# Patient Record
Sex: MALE | Employment: FULL TIME | URBAN - METROPOLITAN AREA
[De-identification: names, ages, dates, MRNs, and addresses within clinical notes are randomized per-mention and may not be internally consistent; named-entity substitution may affect disease eponyms.]

---

## 2017-01-11 ENCOUNTER — TRANSFERRED RECORDS (OUTPATIENT)
Dept: HEALTH INFORMATION MANAGEMENT | Facility: CLINIC | Age: 52
End: 2017-01-11

## 2017-01-25 ENCOUNTER — TRANSFERRED RECORDS (OUTPATIENT)
Dept: HEALTH INFORMATION MANAGEMENT | Facility: CLINIC | Age: 52
End: 2017-01-25

## 2017-01-27 ENCOUNTER — TRANSFERRED RECORDS (OUTPATIENT)
Dept: HEALTH INFORMATION MANAGEMENT | Facility: CLINIC | Age: 52
End: 2017-01-27

## 2017-01-31 ENCOUNTER — MYC MEDICAL ADVICE (OUTPATIENT)
Dept: FAMILY MEDICINE | Facility: CLINIC | Age: 52
End: 2017-01-31

## 2017-01-31 DIAGNOSIS — M47.816 OSTEOARTHRITIS OF LUMBAR SPINE, UNSPECIFIED SPINAL OSTEOARTHRITIS COMPLICATION STATUS: Primary | ICD-10-CM

## 2017-02-08 ENCOUNTER — TRANSFERRED RECORDS (OUTPATIENT)
Dept: HEALTH INFORMATION MANAGEMENT | Facility: CLINIC | Age: 52
End: 2017-02-08

## 2017-02-08 ENCOUNTER — OFFICE VISIT (OUTPATIENT)
Dept: FAMILY MEDICINE | Facility: CLINIC | Age: 52
End: 2017-02-08
Payer: COMMERCIAL

## 2017-02-08 VITALS
TEMPERATURE: 97.8 F | BODY MASS INDEX: 28.33 KG/M2 | SYSTOLIC BLOOD PRESSURE: 116 MMHG | HEIGHT: 66 IN | HEART RATE: 57 BPM | WEIGHT: 176.3 LBS | DIASTOLIC BLOOD PRESSURE: 79 MMHG | OXYGEN SATURATION: 99 %

## 2017-02-08 DIAGNOSIS — M47.816 OSTEOARTHRITIS OF LUMBAR SPINE, UNSPECIFIED SPINAL OSTEOARTHRITIS COMPLICATION STATUS: ICD-10-CM

## 2017-02-08 DIAGNOSIS — Z13.6 CARDIOVASCULAR SCREENING; LDL GOAL LESS THAN 130: ICD-10-CM

## 2017-02-08 DIAGNOSIS — Z11.59 NEED FOR HEPATITIS C SCREENING TEST: ICD-10-CM

## 2017-02-08 DIAGNOSIS — M47.26 OSTEOARTHRITIS OF SPINE WITH RADICULOPATHY, LUMBAR REGION: ICD-10-CM

## 2017-02-08 DIAGNOSIS — I10 ESSENTIAL HYPERTENSION WITH GOAL BLOOD PRESSURE LESS THAN 140/90: Primary | ICD-10-CM

## 2017-02-08 DIAGNOSIS — Z12.5 SCREENING FOR PROSTATE CANCER: ICD-10-CM

## 2017-02-08 DIAGNOSIS — E87.6 HYPOKALEMIA: ICD-10-CM

## 2017-02-08 LAB
ANION GAP SERPL CALCULATED.3IONS-SCNC: 9 MMOL/L (ref 3–14)
BUN SERPL-MCNC: 12 MG/DL (ref 7–30)
CALCIUM SERPL-MCNC: 9.2 MG/DL (ref 8.5–10.1)
CHLORIDE SERPL-SCNC: 103 MMOL/L (ref 94–109)
CHOLEST SERPL-MCNC: 195 MG/DL
CO2 SERPL-SCNC: 30 MMOL/L (ref 20–32)
CREAT SERPL-MCNC: 1.12 MG/DL (ref 0.66–1.25)
GFR SERPL CREATININE-BSD FRML MDRD: 68 ML/MIN/1.7M2
GLUCOSE SERPL-MCNC: 106 MG/DL (ref 70–99)
HCV AB SERPL QL IA: NORMAL
HDLC SERPL-MCNC: 56 MG/DL
LDLC SERPL CALC-MCNC: 116 MG/DL
NONHDLC SERPL-MCNC: 139 MG/DL
POTASSIUM SERPL-SCNC: 3.2 MMOL/L (ref 3.4–5.3)
PSA SERPL-ACNC: 0.93 UG/L (ref 0–4)
SODIUM SERPL-SCNC: 142 MMOL/L (ref 133–144)
TRIGL SERPL-MCNC: 113 MG/DL

## 2017-02-08 PROCEDURE — G0103 PSA SCREENING: HCPCS | Performed by: FAMILY MEDICINE

## 2017-02-08 PROCEDURE — 99214 OFFICE O/P EST MOD 30 MIN: CPT | Performed by: FAMILY MEDICINE

## 2017-02-08 PROCEDURE — 80048 BASIC METABOLIC PNL TOTAL CA: CPT | Performed by: FAMILY MEDICINE

## 2017-02-08 PROCEDURE — 36415 COLL VENOUS BLD VENIPUNCTURE: CPT | Performed by: FAMILY MEDICINE

## 2017-02-08 PROCEDURE — 86803 HEPATITIS C AB TEST: CPT | Performed by: FAMILY MEDICINE

## 2017-02-08 PROCEDURE — 80061 LIPID PANEL: CPT | Performed by: FAMILY MEDICINE

## 2017-02-08 RX ORDER — POTASSIUM CHLORIDE 1500 MG/1
20 TABLET, EXTENDED RELEASE ORAL DAILY
Qty: 90 TABLET | Refills: 1 | Status: SHIPPED | OUTPATIENT
Start: 2017-02-08 | End: 2018-03-04

## 2017-02-08 RX ORDER — HYDROCODONE BITARTRATE AND ACETAMINOPHEN 5; 325 MG/1; MG/1
1 TABLET ORAL EVERY 6 HOURS PRN
Qty: 42 TABLET | Refills: 0 | Status: SHIPPED | OUTPATIENT
Start: 2017-02-08 | End: 2018-03-04

## 2017-02-08 RX ORDER — ATENOLOL AND CHLORTHALIDONE TABLET 50; 25 MG/1; MG/1
1 TABLET ORAL DAILY
Qty: 90 TABLET | Refills: 1 | Status: SHIPPED | OUTPATIENT
Start: 2017-02-08 | End: 2017-09-17

## 2017-02-08 NOTE — MR AVS SNAPSHOT
After Visit Summary   2/8/2017    Thanh Loaiza    MRN: [id number]           Patient Information     Date Of Birth          1965        Visit Information        Provider Department      2/8/2017 8:40 AM Saravanan Bocanegra MD [facility]        Today's Diagnoses     Essential hypertension with goal blood pressure less than 140/90    -  1     CARDIOVASCULAR SCREENING; LDL GOAL LESS THAN 130         Screening for prostate cancer         Need for hepatitis C screening test           Care Instructions    This summary includes the important diagnoses, test, medications and other important parts of your medical history.  Below are a few good we sites you can use to learn more about these.     Www.Agenus.AppMesh : Up to date and easily searchable information on multiple topics.  Www.Agenus.AppMesh/Pharmacy/c_539084.asp : [facility] $4.99 medications  Www.Catalyze.gov : medication info, interactive tutorials, watch real surgeries online  Www.familydoctor.org : good info from the Academy of Family Physicians  Www.Unsubscribe.com.Revizer : good info from the [facility]  Www.cdc.gov : public health info, travel advisories, epidemics (H1N1)  Www.aap.org : children's health info, normal development, vaccinations  Www.health.[facility].[place].us : [place] dept of heatlh, public health issues in [place], N1N1    Based on your medical history and these are the current health maintenance or preventive care services that you are due for (some may have been done at this visit:)  Health Maintenance Due   Topic Date Due     HEPATITIS C SCREENING  08/27/1981     INFLUENZA VACCINE (SYSTEM ASSIGNED)  09/01/2016     =================================================================================  Normal Values   Blood pressure  <140/90 for most adults    <130/80 for some chronic diseases (ask your care team about yours)    BMI (body mass index)  18.5-25 kg/m2 (based on height and weight)     Thank you for visiting [facility]  [facility]    Normal or non-critical lab and imaging results will be communicated to you by MyChart, letter or phone within 7 days.  If you do not hear from us within 10 days, please call the clinic. If you have a critical or abnormal lab result, we will notify you by phone as soon as possible.     If you have any questions regarding your visit please contact:     Team Comfort:   Clinic Hours Telephone Number   Dr. Olu Murphy   7am-5pm  Monday - Friday [phone]  Kayy PHILLIPS   Pharmacy 8:30am-9pm Monday-Friday    9am-5pm Saturday-Sunday [phone]   Urgent Care 11am-9pm Monday-Friday        9am-5pm Saturday-Sunday [phone]     After hours, weekend or if you need to make an appointment with your primary provider please call [phone].   After Hours nurse advise: call [facility]: [phone]    Medication Refills:  Call your pharmacy and they will forward the refill to us. Please allow 3 business days for your refills to be completed.    Use Verastem (secure email communication and access to your chart) to send your primary care provider a message or make an appointment. Ask someone on your Team how to sign up for Verastem. To log on to Lulu or for more information in Reviewspotter please visit the website at [email or web address].  As of October 8, 2013, all password changes, disabled accounts, or ID changes in Verastem/MyHealth will be done by our Access Services Department.   If you need help with your account or password, call: [phone]. Clinic staff no longer has the ability to change passwords.           Follow-ups after your visit        Who to contact     If you have questions or need follow up information about today's clinic visit or your schedule please contact [facility] directly at [phone].  Normal or non-critical lab and imaging results will be communicated to you by  "MyChart, letter or phone within 4 business days after the clinic has received the results. If you do not hear from us within 7 days, please contact the clinic through SilverLine Globalt or phone. If you have a critical or abnormal lab result, we will notify you by phone as soon as possible.  Submit refill requests through Fanarchy Limited or call your pharmacy and they will forward the refill request to us. Please allow 3 business days for your refill to be completed.          Additional Information About Your Visit        Downrange Enterpriseshart Information     Fanarchy Limited gives you secure access to your electronic health record. If you see a primary care provider, you can also send messages to your care team and make appointments. If you have questions, please call your primary care clinic.  If you do not have a primary care provider, please call [phone] and they will assist you.        Care EveryWhere ID     This is your Care EveryWhere ID. This could be used by other organizations to access your [facility] medical records  [id number]        Your Vitals Were     Pulse Temperature Height BMI (Body Mass Index) Pulse Oximetry       57 97.8  F (36.6  C) (Oral) 5' 6.34\" (1.685 m) 28.17 kg/m2 99%        Blood Pressure from Last 3 Encounters:   02/08/17 116/79   11/30/16 112/79   08/09/16 114/81    Weight from Last 3 Encounters:   02/08/17 176 lb 4.8 oz (79.969 kg)   11/30/16 181 lb (82.101 kg)   08/09/16 176 lb (79.833 kg)              We Performed the Following     Basic metabolic panel     Hepatitis C Screen Reflex to HCV RNA Quant and Genotype     Lipid Profile with reflex to direct LDL     PSA, screen          Today's Medication Changes          These changes are accurate as of: 2/8/17  9:05 AM.  If you have any questions, ask your nurse or doctor.               These medicines have changed or have updated prescriptions.        Dose/Directions    omeprazole 20 MG CR capsule   Commonly known as:  priLOSEC   This may have changed:    - when to take " this  - reasons to take this   Used for:  Gastroesophageal reflux disease without esophagitis        Dose:  20 mg   Take 1 capsule (20 mg) by mouth daily   Quantity:  90 capsule   Refills:  3                Primary Care Provider Office Phone # Fax #    Saravanan Bocanegra [phone]       [facility] [address]  [facility] [address]        Thank you!     Thank you for choosing [facility]  for your care. Our goal is always to provide you with excellent care. Hearing back from our patients is one way we can continue to improve our services. Please take a few minutes to complete the written survey that you may receive in the mail after your visit with us. Thank you!             Your Updated Medication List - Protect others around you: Learn how to safely use, store and throw away your medicines at www.disposemymeds.org.          This list is accurate as of: 2/8/17  9:05 AM.  Always use your most recent med list.                   Brand Name Dispense Instructions for use    aspirin 81 MG tablet     90 tablet    Take 81 mg by mouth as needed       atenolol-chlorthalidone 50-25 MG per tablet    TENORETIC    90 tablet    Take 1 tablet by mouth daily       omeprazole 20 MG CR capsule    priLOSEC    90 capsule    Take 1 capsule (20 mg) by mouth daily       potassium chloride SA 20 MEQ CR tablet    potassium chloride    90 tablet    Take 1 tablet (20 mEq) by mouth daily

## 2017-02-08 NOTE — PROGRESS NOTES
"  SUBJECTIVE:                                                    Thanh Loaiza is a 51 year old male who presents to clinic today for the following health issues:    Hypertension Follow-up      Outpatient blood pressures are being checked at store.  Results are 120/80.    Low Salt Diet: not monitoring salt       Amount of exercise or physical activity: not able to because of back per pt    Problems taking medications regularly: No    Medication side effects: none    Diet: regular (no restrictions)    Problem list and histories reviewed & adjusted, as indicated.  Additional history: as documented    Problem list, Medication list, Allergies, and Medical/Social/Surgical histories reviewed in EPIC and updated as appropriate.    ROS:  Constitutional, HEENT, cardiovascular, pulmonary, GI, , musculoskeletal, neuro, skin, endocrine and psych systems are negative, except as otherwise noted.    OBJECTIVE:                                                    /79 mmHg  Pulse 57  Temp(Src) 97.8  F (36.6  C) (Oral)  Ht 5' 6.34\" (1.685 m)  Wt 176 lb 4.8 oz (79.969 kg)  BMI 28.17 kg/m2  SpO2 99%  Body mass index is 28.17 kg/(m^2).  GENERAL: healthy, alert and no distress  NECK: no adenopathy, no asymmetry, masses, or scars and thyroid normal to palpation  RESP: lungs clear to auscultation - no rales, rhonchi or wheezes  CV: regular rate and rhythm, normal S1 S2, no S3 or S4, no murmur, click or rub, no peripheral edema and peripheral pulses strong  ABDOMEN: soft, nontender, no hepatosplenomegaly, no masses and bowel sounds normal  MS: no gross musculoskeletal defects noted, no edema    Diagnostic Test Results:  none      ASSESSMENT/PLAN:                                                      1. Essential hypertension with goal blood pressure less than 140/90  Controlled. Continue with current medications. RTC in 6 months for recheck.  - Basic metabolic panel; Future  - Basic metabolic panel    2. CARDIOVASCULAR SCREENING; " LDL GOAL LESS THAN 130  Controlled. Reviewed low fat diet.  - Lipid Profile with reflex to direct LDL; Future  - Lipid Profile with reflex to direct LDL    3. Screening for prostate cancer    - PSA, screen; Future  - PSA, screen    4. Need for hepatitis C screening test    - Hepatitis C Screen Reflex to HCV RNA Quant and Genotype; Future  - Hepatitis C Screen Reflex to HCV RNA Quant and Genotype    See Patient Instructions    Saravanan Bocanegra MD, MD  [facility]

## 2017-02-08 NOTE — PATIENT INSTRUCTIONS
This summary includes the important diagnoses, test, medications and other important parts of your medical history.  Below are a few good we sites you can use to learn more about these.     Www.Cavitation Technologies.org : Up to date and easily searchable information on multiple topics.  Www.Cavitation Technologies.org/Pharmacy/c_539084.asp : [facility] $4.99 medications  Www.medlineplus.gov : medication info, interactive tutorials, watch real surgeries online  Www.familydoctor.org : good info from the Academy of Family Physicians  Www.KuponGidinic.com : good info from the [facility]  Www.cdc.gov : public health info, travel advisories, epidemics (H1N1)  Www.aap.org : children's health info, normal development, vaccinations  Www.health.[facility].[place].us : [place] dept of heat, public health issues in [place], N1N1    Based on your medical history and these are the current health maintenance or preventive care services that you are due for (some may have been done at this visit:)  Health Maintenance Due   Topic Date Due     HEPATITIS C SCREENING  08/27/1981     INFLUENZA VACCINE (SYSTEM ASSIGNED)  09/01/2016     =================================================================================  Normal Values   Blood pressure  <140/90 for most adults    <130/80 for some chronic diseases (ask your care team about yours)    BMI (body mass index)  18.5-25 kg/m2 (based on height and weight)     Thank you for visiting [facility]    Normal or non-critical lab and imaging results will be communicated to you by MyChart, letter or phone within 7 days.  If you do not hear from us within 10 days, please call the clinic. If you have a critical or abnormal lab result, we will notify you by phone as soon as possible.     If you have any questions regarding your visit please contact:     Team Comfort:   Clinic Hours Telephone Number   Dr. Olu Murphy   7am-5pm  Monday - Friday  [phone]  Kayy PHILLIPS   Pharmacy 8:30am-9pm Monday-Friday    9am-5pm Saturday-Sunday [phone]   Urgent Care 11am-9pm Monday-Friday        9am-5pm Saturday-Sunday [phone]     After hours, weekend or if you need to make an appointment with your primary provider please call [phone].   After Hours nurse advise: call [facility]: [phone]    Medication Refills:  Call your pharmacy and they will forward the refill to us. Please allow 3 business days for your refills to be completed.    Use Zhongheedu (secure email communication and access to your chart) to send your primary care provider a message or make an appointment. Ask someone on your Team how to sign up for Zhongheedu. To log on to Cramster or for more information in "[employer] please visit the website at [email or web address].  As of October 8, 2013, all password changes, disabled accounts, or ID changes in Zhongheedu/MyHealth will be done by our Access Services Department.   If you need help with your account or password, call: [phone]. Clinic staff no longer has the ability to change passwords.

## 2017-02-08 NOTE — NURSING NOTE
"Chief Complaint   Patient presents with     Hypertension       Initial /79 mmHg  Pulse 57  Temp(Src) 97.8  F (36.6  C) (Oral)  Ht 5' 6.34\" (1.685 m)  Wt 176 lb 4.8 oz (79.969 kg)  BMI 28.17 kg/m2  SpO2 99% Estimated body mass index is 28.17 kg/(m^2) as calculated from the following:    Height as of this encounter: 5' 6.34\" (1.685 m).    Weight as of this encounter: 176 lb 4.8 oz (79.969 kg).  Medication Reconciliation: complete. LUCY Lee      "

## 2017-02-17 ENCOUNTER — OFFICE VISIT (OUTPATIENT)
Dept: FAMILY MEDICINE | Facility: CLINIC | Age: 52
End: 2017-02-17
Payer: COMMERCIAL

## 2017-02-17 VITALS
RESPIRATION RATE: 16 BRPM | DIASTOLIC BLOOD PRESSURE: 74 MMHG | BODY MASS INDEX: 27.62 KG/M2 | TEMPERATURE: 98 F | OXYGEN SATURATION: 96 % | HEIGHT: 67 IN | WEIGHT: 176 LBS | HEART RATE: 59 BPM | SYSTOLIC BLOOD PRESSURE: 128 MMHG

## 2017-02-17 DIAGNOSIS — Z01.818 PREOP GENERAL PHYSICAL EXAM: Primary | ICD-10-CM

## 2017-02-17 LAB
ALBUMIN UR-MCNC: NEGATIVE MG/DL
APPEARANCE UR: CLEAR
BILIRUB UR QL STRIP: NEGATIVE
COLOR UR AUTO: YELLOW
ERYTHROCYTE [DISTWIDTH] IN BLOOD BY AUTOMATED COUNT: 13.8 % (ref 10–15)
GLUCOSE UR STRIP-MCNC: NEGATIVE MG/DL
HCT VFR BLD AUTO: 45.9 % (ref 40–53)
HGB BLD-MCNC: 14.9 G/DL (ref 13.3–17.7)
HGB UR QL STRIP: NEGATIVE
KETONES UR STRIP-MCNC: NEGATIVE MG/DL
LEUKOCYTE ESTERASE UR QL STRIP: NEGATIVE
MCH RBC QN AUTO: 26.5 PG (ref 26.5–33)
MCHC RBC AUTO-ENTMCNC: 32.5 G/DL (ref 31.5–36.5)
MCV RBC AUTO: 82 FL (ref 78–100)
MUCOUS THREADS #/AREA URNS LPF: PRESENT /LPF
NITRATE UR QL: NEGATIVE
PH UR STRIP: 6.5 PH (ref 5–7)
PLATELET # BLD AUTO: 280 10E9/L (ref 150–450)
RBC # BLD AUTO: 5.63 10E12/L (ref 4.4–5.9)
RBC #/AREA URNS AUTO: ABNORMAL /HPF (ref 0–2)
SP GR UR STRIP: 1.01 (ref 1–1.03)
URN SPEC COLLECT METH UR: ABNORMAL
UROBILINOGEN UR STRIP-ACNC: 0.2 EU/DL (ref 0.2–1)
WBC # BLD AUTO: 6.8 10E9/L (ref 4–11)
WBC #/AREA URNS AUTO: ABNORMAL /HPF (ref 0–2)

## 2017-02-17 PROCEDURE — 99214 OFFICE O/P EST MOD 30 MIN: CPT | Performed by: FAMILY MEDICINE

## 2017-02-17 PROCEDURE — 81001 URINALYSIS AUTO W/SCOPE: CPT | Performed by: FAMILY MEDICINE

## 2017-02-17 PROCEDURE — 93000 ELECTROCARDIOGRAM COMPLETE: CPT | Performed by: FAMILY MEDICINE

## 2017-02-17 PROCEDURE — 36415 COLL VENOUS BLD VENIPUNCTURE: CPT | Performed by: FAMILY MEDICINE

## 2017-02-17 PROCEDURE — 85027 COMPLETE CBC AUTOMATED: CPT | Performed by: FAMILY MEDICINE

## 2017-02-17 ASSESSMENT — PAIN SCALES - GENERAL: PAINLEVEL: SEVERE PAIN (7)

## 2017-02-17 NOTE — MR AVS SNAPSHOT
After Visit Summary   2/17/2017    Thanh Loaiza    MRN: [id number]           Patient Information     Date Of Birth          1965        Visit Information        Provider Department      2/17/2017 8:00 AM Saravanan Bocanegra MD [facility]        Today's Diagnoses     Preop general physical exam    -  1      Care Instructions    How to contact your care team: [phone] Pharmacy [phone]   GOPI DOTSON MD KATYA GEORGIEV, PA-C CHRIS JONES, PA-C NAM HO, MD JONATHAN BATES, MD ARVIN VOCAL, MD    Clinic hours M-Th 7am-7pm Fri 7am-5pm.   Urgent care M-F 11am-9pm  Sat/Sun 9am-5pm.   Pharmacy   Mon-Th:  8:00am-8pm   Fri:  8:00am-6:00pm  Sat/Sun  8:00am-5:00 pm       Before Your Surgery      Call your surgeon if there is any change in your health. This includes signs of a cold or flu (such as a sore throat, runny nose, cough, rash or fever).    Do not smoke, drink alcohol or take over the counter medicine (unless your surgeon or primary care doctor tells you to) for the 24 hours before and after surgery.    If you take prescribed drugs: Follow your doctor s orders about which medicines to take and which to stop until after surgery.    Eating and drinking prior to surgery: follow the instructions from your surgeon    Take a shower or bath the night before surgery. Use the soap your surgeon gave you to gently clean your skin. If you do not have soap from your surgeon, use your regular soap. Do not shave or scrub the surgery site.  Wear clean pajamas and have clean sheets on your bed.         Follow-ups after your visit        Who to contact     If you have questions or need follow up information about today's clinic visit or your schedule please contact [facility] directly at [phone].  Normal or non-critical lab and imaging results will be communicated to you by MyChart, letter or phone within 4 business days after the clinic has  "received the results. If you do not hear from us within 7 days, please contact the clinic through Snapdeal or phone. If you have a critical or abnormal lab result, we will notify you by phone as soon as possible.  Submit refill requests through Snapdeal or call your pharmacy and they will forward the refill request to us. Please allow 3 business days for your refill to be completed.          Additional Information About Your Visit        [employer] Information     Snapdeal gives you secure access to your electronic health record. If you see a primary care provider, you can also send messages to your care team and make appointments. If you have questions, please call your primary care clinic.  If you do not have a primary care provider, please call [phone] and they will assist you.        Care EveryWhere ID     This is your Care EveryWhere ID. This could be used by other organizations to access your [facility] medical records  [id number]        Your Vitals Were     Pulse Temperature Respirations Height Pulse Oximetry BMI (Body Mass Index)    59 98  F (36.7  C) (Oral) 16 5' 6.5\" (1.689 m) 96% 27.98 kg/m2       Blood Pressure from Last 3 Encounters:   02/17/17 128/74   02/08/17 116/79   11/30/16 112/79    Weight from Last 3 Encounters:   02/17/17 176 lb (79.8 kg)   02/08/17 176 lb 4.8 oz (80 kg)   11/30/16 181 lb (82.1 kg)              We Performed the Following     EKG 12-lead complete w/read - Clinics          Today's Medication Changes          These changes are accurate as of: 2/17/17  8:51 AM.  If you have any questions, ask your nurse or doctor.               These medicines have changed or have updated prescriptions.        Dose/Directions    omeprazole 20 MG CR capsule   Commonly known as:  priLOSEC   This may have changed:    - when to take this  - reasons to take this   Used for:  Gastroesophageal reflux disease without esophagitis        Dose:  20 mg   Take 1 capsule (20 mg) by mouth daily   Quantity:  90 " capsule   Refills:  3                Primary Care Provider Office Phone # Fax #    Saravanan Bocanegra -[phone]       [facility] [address]  [facility] [address]        Thank you!     Thank you for choosing [facility]  for your care. Our goal is always to provide you with excellent care. Hearing back from our patients is one way we can continue to improve our services. Please take a few minutes to complete the written survey that you may receive in the mail after your visit with us. Thank you!             Your Updated Medication List - Protect others around you: Learn how to safely use, store and throw away your medicines at www.disposemymeds.org.          This list is accurate as of: 2/17/17  8:51 AM.  Always use your most recent med list.                   Brand Name Dispense Instructions for use    aspirin 81 MG tablet     90 tablet    Take 81 mg by mouth as needed       atenolol-chlorthalidone 50-25 MG per tablet    TENORETIC    90 tablet    Take 1 tablet by mouth daily       HYDROcodone-acetaminophen 5-325 MG per tablet    NORCO    42 tablet    Take 1 tablet by mouth every 6 hours as needed for moderate to severe pain       omeprazole 20 MG CR capsule    priLOSEC    90 capsule    Take 1 capsule (20 mg) by mouth daily       potassium chloride SA 20 MEQ CR tablet    potassium chloride    90 tablet    Take 1 tablet (20 mEq) by mouth daily

## 2017-02-17 NOTE — PATIENT INSTRUCTIONS
How to contact your care team: [phone] Pharmacy [phone]   GOPI DOTSON MD KATYA GEORGIEV, PA-C CHRIS JONES, PA-C NAM HO, MD JONATHAN BATES, MD ARVIN VOCAL, MD    Clinic hours M-Th 7am-7pm Fri 7am-5pm.   Urgent care M-F 11am-9pm  Sat/Sun 9am-5pm.   Pharmacy   Mon-Th:  8:00am-8pm   Fri:  8:00am-6:00pm  Sat/Sun  8:00am-5:00 pm       Before Your Surgery      Call your surgeon if there is any change in your health. This includes signs of a cold or flu (such as a sore throat, runny nose, cough, rash or fever).    Do not smoke, drink alcohol or take over the counter medicine (unless your surgeon or primary care doctor tells you to) for the 24 hours before and after surgery.    If you take prescribed drugs: Follow your doctor s orders about which medicines to take and which to stop until after surgery.    Eating and drinking prior to surgery: follow the instructions from your surgeon    Take a shower or bath the night before surgery. Use the soap your surgeon gave you to gently clean your skin. If you do not have soap from your surgeon, use your regular soap. Do not shave or scrub the surgery site.  Wear clean pajamas and have clean sheets on your bed.

## 2017-02-17 NOTE — NURSING NOTE
"Chief Complaint   Patient presents with     Pre-Op Exam       Initial /74 (BP Location: Left arm, Patient Position: Chair, Cuff Size: Adult Large)  Pulse 59  Temp 98  F (36.7  C) (Oral)  Resp 16  Ht 5' 6.5\" (1.689 m)  Wt 176 lb (79.8 kg)  SpO2 96%  BMI 27.98 kg/m2 Estimated body mass index is 27.98 kg/(m^2) as calculated from the following:    Height as of this encounter: 5' 6.5\" (1.689 m).    Weight as of this encounter: 176 lb (79.8 kg).  Medication Reconciliation: complete     Kaylyn Tejeda MA       "

## 2017-02-17 NOTE — PROGRESS NOTES
[address] [address]  [phone]  Dept: [phone]    PRE-OP EVALUATION:  Today's date: 2017    Thanh Loaiza (: 1965) presents for pre-operative evaluation assessment as requested by Dr. Bundy.  He requires evaluation and anesthesia risk assessment prior to undergoing surgery/procedure for treatment of back .  Proposed procedure: L5-S1 lumbar fusion.    Date of Surgery/ Procedure: 17  Time of Surgery/ Procedure: 8:00am  Hospital/Surgical Facility: [facility]  Fax number for surgical facility: [phone]  Primary Physician: Saravanan Bocanegra  Type of Anesthesia Anticipated: General    Patient has a Health Care Directive or Living Will:  NO    1. NO - Do you have a history of heart attack, stroke, stent, bypass or surgery on an artery in the head, neck, heart or legs?  2. NO - Do you ever have any pain or discomfort in your chest?  3. NO - Do you have a history of  Heart Failure?  4. NO - Are you troubled by shortness of breath when: walking on the level, up a slight hill or at night?  5. NO - Do you currently have a cold, bronchitis or other respiratory infection?  6. NO - Do you have a cough, shortness of breath or wheezing?  7. NO - Do you sometimes get pains in the calves of your legs when you walk?  8. NO - Do you or anyone in your family have previous history of blood clots?  9. NO - Do you or does anyone in your family have a serious bleeding problem such as prolonged bleeding following surgeries or cuts?  10. NO - Have you ever had problems with anemia or been told to take iron pills?  11. NO - Have you had any abnormal blood loss such as black, tarry or bloody stools, or abnormal vaginal bleeding?  12. NO - Have you ever had a blood transfusion?  13. NO - Have you or any of your relatives ever had problems with anesthesia?  14. NO - Do you have sleep apnea, excessive snoring or daytime drowsiness?  15. NO  - Do you have any prosthetic heart valves?  16. NO - Do you have prosthetic joints?  17. NO - Is there any chance that you may be pregnant?      HPI:                                                      Brief HPI related to upcoming procedure: h/o chronic low back pain, failed conservative management. Patient would like to proceed with procedure.      See problem list for active medical problems.  Problems all longstanding and stable, except as noted/documented.  See ROS for pertinent symptoms related to these conditions.                                                                                                     MEDICAL HISTORY:                                                      Patient Active Problem List    Diagnosis Date Noted     Gastroesophageal reflux disease without esophagitis 02/04/2016     Priority: Medium     Cataract extraction status of right eye 03/12/2015     Priority: Medium     DJD (degenerative joint disease), lumbar 02/12/2013     Priority: Medium     See MRI 2012 (L L5-S1)       Disorder of sacrum 11/26/2010     Priority: Medium     Problem list name updated by automated process. Provider to review       Lumbago 11/26/2010     Priority: Medium     CARDIOVASCULAR SCREENING; LDL GOAL LESS THAN 130 10/31/2010     Priority: Medium     Erectile dysfunction 04/21/2010     Priority: Medium      Past Medical History   Diagnosis Date     DDD (degenerative disc disease), lumbar      DJD (degenerative joint disease), lumbar      Hypertension goal BP (blood pressure) < 140/90 12/14/2010     Past Surgical History   Procedure Laterality Date     Tonsillectomy       age 11 years     Colonoscopy  10/14/2011     Procedure:COLONOSCOPY; Colonoscopy, change in stools, diarrhea; Surgeon:ALEIDA DELEON; Location: OR     Colonoscopy  10/14/2011     Procedure:COMBINED COLONOSCOPY, SINGLE BIOPSY/POLYPECTOMY BY BIOPSY; Surgeon:ALEIDA DELEON; Location:[facility] OR     Current Outpatient Prescriptions   Medication Sig  "Dispense Refill     atenolol-chlorthalidone (TENORETIC) 50-25 MG per tablet Take 1 tablet by mouth daily 90 tablet 1     potassium chloride SA (POTASSIUM CHLORIDE) 20 MEQ CR tablet Take 1 tablet (20 mEq) by mouth daily 90 tablet 1     HYDROcodone-acetaminophen (NORCO) 5-325 MG per tablet Take 1 tablet by mouth every 6 hours as needed for moderate to severe pain 42 tablet 0     omeprazole (PRILOSEC) 20 MG capsule Take 1 capsule (20 mg) by mouth daily (Patient taking differently: Take 20 mg by mouth as needed ) 90 capsule 3     aspirin 81 MG tablet Take 81 mg by mouth as needed  90 tablet 3     OTC products: Aspirin    No Known Allergies   Latex Allergy: NO    Social History   Substance Use Topics     Smoking status: Never Smoker     Smokeless tobacco: Never Used     Alcohol use No     History   Drug Use No       REVIEW OF SYSTEMS:                                                    C: NEGATIVE for fever, chills, change in weight  E/M: NEGATIVE for ear, mouth and throat problems  R: NEGATIVE for significant cough or SOB  CV: NEGATIVE for chest pain, palpitations or peripheral edema    EXAM:                                                    /74 (BP Location: Left arm, Patient Position: Chair, Cuff Size: Adult Large)  Pulse 59  Temp 98  F (36.7  C) (Oral)  Resp 16  Ht 5' 6.5\" (1.689 m)  Wt 176 lb (79.8 kg)  SpO2 96%  BMI 27.98 kg/m2  GENERAL APPEARANCE: healthy, alert and no distress  HENT: ear canals and TM's normal and nose and mouth without ulcers or lesions  RESP: lungs clear to auscultation - no rales, rhonchi or wheezes  CV: regular rate and rhythm, normal S1 S2, no S3 or S4 and no murmur, click or rub   ABDOMEN: soft, nontender, no HSM or masses and bowel sounds normal  NEURO: Normal strength and tone, sensory exam grossly normal, mentation intact and speech normal    DIAGNOSTICS:                                                    EKG: sinus bradycardia, normal axis, normal intervals, no acute ST/T changes " c/w ischemia, no LVH by voltage criteria, unchanged from previous tracings  CBC pending.    Recent Labs   Lab Test  02/08/17   0843  11/30/16   0848   06/23/16   1010   HGB   --   15.2   --   15.8   PLT   --   297   --   287   INR   --   0.97   --    --    NA  142  142   < >  140   POTASSIUM  3.2*  3.2*   < >  2.9*   CR  1.12  1.03   < >  1.03    < > = values in this interval not displayed.        IMPRESSION:                                                    Reason for surgery/procedure: lumbar fusion  Diagnosis/reason for consult: preop clearance    The proposed surgical procedure is considered INTERMEDIATE risk.    REVISED CARDIAC RISK INDEX  The patient has the following serious cardiovascular risks for perioperative complications such as (MI, PE, VFib and 3  AV Block):  No serious cardiac risks  INTERPRETATION: 0 risks: Class I (very low risk - 0.4% complication rate)    The patient has the following additional risks for perioperative complications:  No identified additional risks      ICD-10-CM    1. Preop general physical exam Z01.818 EKG 12-lead complete w/read - Clinics       RECOMMENDATIONS:                                                      --Patient is to take all scheduled medications on the day of surgery EXCEPT for modifications listed below.    Anticoagulant or Antiplatelet Medication Use  ASPIRIN: Discontinue ASA 7-10 days prior to procedure to reduce bleeding risk.  It should be resumed post-operatively.        APPROVAL GIVEN to proceed with proposed procedure, without further diagnostic evaluation       Signed Electronically by: Saravanan Bocanegra MD, MD    Copy of this evaluation report is provided to requesting physician.    [facility] Preop Guidelines

## 2017-02-27 ENCOUNTER — TRANSFERRED RECORDS (OUTPATIENT)
Dept: HEALTH INFORMATION MANAGEMENT | Facility: CLINIC | Age: 52
End: 2017-02-27

## 2017-03-22 ENCOUNTER — OFFICE VISIT (OUTPATIENT)
Dept: FAMILY MEDICINE | Facility: CLINIC | Age: 52
End: 2017-03-22
Payer: COMMERCIAL

## 2017-03-22 VITALS
HEIGHT: 66 IN | WEIGHT: 179.8 LBS | SYSTOLIC BLOOD PRESSURE: 109 MMHG | DIASTOLIC BLOOD PRESSURE: 72 MMHG | TEMPERATURE: 97 F | HEART RATE: 58 BPM | BODY MASS INDEX: 28.9 KG/M2 | OXYGEN SATURATION: 99 %

## 2017-03-22 DIAGNOSIS — R35.0 FREQUENT URINATION: Primary | ICD-10-CM

## 2017-03-22 PROBLEM — E46 PROTEIN-CALORIE MALNUTRITION (H): Status: ACTIVE | Noted: 2017-03-22

## 2017-03-22 LAB
ALBUMIN UR-MCNC: NEGATIVE MG/DL
APPEARANCE UR: CLEAR
BILIRUB UR QL STRIP: NEGATIVE
COLOR UR AUTO: YELLOW
GLUCOSE UR STRIP-MCNC: NEGATIVE MG/DL
HGB UR QL STRIP: NEGATIVE
KETONES UR STRIP-MCNC: NEGATIVE MG/DL
LEUKOCYTE ESTERASE UR QL STRIP: NEGATIVE
NITRATE UR QL: NEGATIVE
PH UR STRIP: 6.5 PH (ref 5–7)
SP GR UR STRIP: 1.02 (ref 1–1.03)
URN SPEC COLLECT METH UR: NORMAL
UROBILINOGEN UR STRIP-ACNC: 0.2 EU/DL (ref 0.2–1)

## 2017-03-22 PROCEDURE — 81003 URINALYSIS AUTO W/O SCOPE: CPT | Performed by: PREVENTIVE MEDICINE

## 2017-03-22 PROCEDURE — 99213 OFFICE O/P EST LOW 20 MIN: CPT | Performed by: PREVENTIVE MEDICINE

## 2017-03-22 NOTE — PROGRESS NOTES
SUBJECTIVE:                                                    Thanh Loaiza is a 51 year old male who presents to clinic today for the following health issues:    I have reviewed and agree with the documentation by the MA. I updated the history as indicated.  Lorena Sam MD MPH      Genitourinary - Male     Onset: x2 weeks     Description:   Dysuria (painful urination): no   Hematuria (blood in urine): no   Frequency: YES  Are you urinating at night : no   Hesitancy (delay in urine): no   Retention (unable to empty): no   Decrease in urinary flow: no     Progression of Symptoms:  same    Accompanying Signs & Symptoms:  Fever: no   Back/Flank pain: no   Urethral discharge: no   Testicle lumps/masses/pain: no   Nausea and/or vomiting: no   Abdominal pain: no    History:   History of frequent UTI's: no   History of kidney stones: no   History of hernias: no   Personal or Family history of Prostate problems: no  Sexually active: no     Precipitating factors:   none    Alleviating factors:  none         Therapies Tried and outcome: none     Feels a sense of incomplete voiding, no penile discharge, not concerned about STDs. No incontinence.  Nocturia 1-2 times per night. No rash.  No increased use of caffeine.  No constipation.   No past history of similar symptoms  Has been taking a Baobab supplement over the counter, started about a month ago.  No family history of prostate cancer       Problem list and histories reviewed & adjusted, as indicated.  Additional history: as documented    Patient Active Problem List   Diagnosis     Erectile dysfunction     CARDIOVASCULAR SCREENING; LDL GOAL LESS THAN 130     Disorder of sacrum     Lumbago     DJD (degenerative joint disease), lumbar     Cataract extraction status of right eye     Gastroesophageal reflux disease without esophagitis     Protein-calorie malnutrition (H)     Past Surgical History:   Procedure Laterality Date     COLONOSCOPY  10/14/2011     Procedure:COLONOSCOPY; Colonoscopy, change in stools, diarrhea; Surgeon:ALEIDA DELEON; Location:[facility]     COLONOSCOPY  10/14/2011    Procedure:COMBINED COLONOSCOPY, SINGLE BIOPSY/POLYPECTOMY BY BIOPSY; Surgeon:ALEIDA DELEON; Location:[facility]     ORTHOPEDIC SURGERY       TONSILLECTOMY      age 11 years       Social History   Substance Use Topics     Smoking status: Never Smoker     Smokeless tobacco: Never Used     Alcohol use No     Family History   Problem Relation Age of Onset     Hypertension Mother      DIABETES Father      Hypertension Father      Hypertension Brother          Current Outpatient Prescriptions   Medication Sig Dispense Refill     atenolol-chlorthalidone (TENORETIC) 50-25 MG per tablet Take 1 tablet by mouth daily 90 tablet 1     potassium chloride SA (POTASSIUM CHLORIDE) 20 MEQ CR tablet Take 1 tablet (20 mEq) by mouth daily 90 tablet 1     HYDROcodone-acetaminophen (NORCO) 5-325 MG per tablet Take 1 tablet by mouth every 6 hours as needed for moderate to severe pain 42 tablet 0     omeprazole (PRILOSEC) 20 MG capsule Take 1 capsule (20 mg) by mouth daily (Patient taking differently: Take 20 mg by mouth as needed ) 90 capsule 3     aspirin 81 MG tablet Take 81 mg by mouth as needed  90 tablet 3     No Known Allergies  BP Readings from Last 3 Encounters:   03/22/17 109/72   02/17/17 128/74   02/08/17 116/79    Wt Readings from Last 3 Encounters:   03/22/17 179 lb 12.8 oz (81.6 kg)   02/17/17 176 lb (79.8 kg)   02/08/17 176 lb 4.8 oz (80 kg)                    Reviewed and updated as needed this visit by clinical staff  Tobacco  Allergies  Meds  Med Hx  Surg Hx  Fam Hx  Soc Hx      Reviewed and updated as needed this visit by Provider         ROS:  Constitutional, HEENT, cardiovascular, pulmonary, gi and gu systems are negative, except as otherwise noted.    OBJECTIVE:                                                    /72 (BP Location: Left arm, Cuff Size: Adult Regular)  Pulse 58   "Temp 97  F (36.1  C) (Oral)  Ht 5' 6.3\" (1.684 m)  Wt 179 lb 12.8 oz (81.6 kg)  SpO2 99%  BMI 28.76 kg/m2  Body mass index is 28.76 kg/(m^2).  GENERAL APPEARANCE: healthy, alert and no distress  EYES: Eyes grossly normal to inspection and conjunctivae and sclerae normal  NECK: no adenopathy and no asymmetry, masses, or scars  RESP: lungs clear to auscultation - no rales, rhonchi or wheezes  CV: regular rates and rhythm, normal S1 S2, no S3 or S4 and no murmur, click or rub  ABDOMEN: soft, non-tender and no rebound or guarding, no CVA tenderness   SKIN: no suspicious lesions or rashes  NEURO: Normal strength and tone, mentation intact and speech normal  PSYCH: mentation appears normal  Rectal: Slightly enlarged prostate, no nodules     Diagnostic test results:  Diagnostic Test Results:  Results for orders placed or performed in visit on 03/22/17 (from the past 24 hour(s))   *UA reflex to Microscopic and Culture ([facility] (except [place] and [place])   Result Value Ref Range    Color Urine Yellow     Appearance Urine Clear     Glucose Urine Negative NEG mg/dL    Bilirubin Urine Negative NEG    Ketones Urine Negative NEG mg/dL    Specific Gravity Urine 1.020 1.003 - 1.035    Blood Urine Negative NEG    pH Urine 6.5 5.0 - 7.0 pH    Protein Albumin Urine Negative NEG mg/dL    Urobilinogen Urine 0.2 0.2 - 1.0 EU/dL    Nitrite Urine Negative NEG    Leukocyte Esterase Urine Negative NEG    Source Midstream Urine           ASSESSMENT/PLAN:                                                    1. Frequent urination  -UA is negative for infections  -BMP and PSA normal 2/17  - *UA reflex to Microscopic and Culture ([facility] (except [place] and [place])  -We discussed holding the Baobab supplement for 2-3 weeks to see if any improvement in symptoms  -If not better then plan to start Flomax 0.4 mg daily and refer to Urology for further evaluation       Follow up " with Provider - Send a My Chart message if not better in 3-4 weeks      Lorena Sam MD MPH    [facility]

## 2017-03-22 NOTE — NURSING NOTE
"Chief Complaint   Patient presents with     Urinary Problem       Initial /72 (BP Location: Left arm, Cuff Size: Adult Regular)  Pulse 58  Temp 97  F (36.1  C) (Oral)  Ht 5' 6.3\" (1.684 m)  Wt 179 lb 12.8 oz (81.6 kg)  SpO2 99%  BMI 28.76 kg/m2 Estimated body mass index is 28.76 kg/(m^2) as calculated from the following:    Height as of this encounter: 5' 6.3\" (1.684 m).    Weight as of this encounter: 179 lb 12.8 oz (81.6 kg).  Medication Reconciliation: complete. LUCY Lee      "

## 2017-03-22 NOTE — PATIENT INSTRUCTIONS
At [facility], we strive to deliver an exceptional experience to you, every time we see you.    If you receive a survey in the mail, please send us back your thoughts. We really do value your feedback.    Thank you for visiting [facility]    Normal or non-critical lab and imaging results will be communicated to you by MyChart, letter or phone within 7 days.  If you do not hear from us within 10 days, please call the clinic. If you have a critical or abnormal lab result, we will notify you by phone as soon as possible.     If you have any questions regarding your visit please contact:     Team Christina/Spirit  Clinic Hours Telephone Number   Dr. Fatemeh Higgins   7am-7pm  Monday through Thursday  7am-5pm Friday [phone]  Blanca RHODES RN   Pharmacy 8:30am-9pm Monday-Friday    9am-5pm Saturday-Sunday [phone]   Urgent Care 11am-9pm Monday-Friday        9am-5pm Saturday-Sunday [phone]     After hours, weekend or if you need to make an appointment with your primary provider please call [phone].   After Hours nurse advise: call [place] [facility]: [phone]    Use [facility] (secure email communication and access to your chart) to send your primary care provider a message or make an appointment. Ask someone on your Team how to sign up for [employer]. To log on to [employer]arm or for more information in We Tribute please visit the website at [email or web address].   As of October 8, 2013, all password changes, disabled accounts, or ID changes in [employer]/MyHealth will be done by our Access Services Department.   If you need help with your account or password, call: [phone]. Clinic staff no longer has the ability to change passwords.

## 2017-03-22 NOTE — MR AVS SNAPSHOT
After Visit Summary   3/22/2017    Thanh Loaiza    MRN: [id number]           Patient Information     Date Of Birth          1965        Visit Information        Provider Department      3/22/2017 12:40 PM Lorena Sam MD [facility]        Today's Diagnoses     Frequent urination    -  1      Care Instructions    At [facility], we strive to deliver an exceptional experience to you, every time we see you.    If you receive a survey in the mail, please send us back your thoughts. We really do value your feedback.    Thank you for visiting [facility]    Normal or non-critical lab and imaging results will be communicated to you by MyChart, letter or phone within 7 days.  If you do not hear from us within 10 days, please call the clinic. If you have a critical or abnormal lab result, we will notify you by phone as soon as possible.     If you have any questions regarding your visit please contact:     Team Christina/Spirit  Clinic Hours Telephone Number   Dr. Fatemeh Higgins   7am-7pm  Monday through Thursday  7am-5pm Friday [phone]  Blanca RHODES RN   Pharmacy 8:30am-9pm Monday-Friday    9am-5pm Saturday-Sunday [phone]   Urgent Care 11am-9pm Monday-Friday        9am-5pm Saturday-Sunday [phone]     After hours, weekend or if you need to make an appointment with your primary provider please call [phone].   After Hours nurse advise: call [place] [facility]: [phone]    Use [employer]ER[employer] assistanthart (secure email communication and access to your chart) to send your primary care provider a message or make an appointment. Ask someone on your Team how to sign up for [employer]. To log on to [employer]oFi or for more information in In Flow please visit the website at [email or web address].   As of October 8, 2013, all password changes,  "disabled accounts, or ID changes in S[employer]/MyHealth will be done by our Access Services Department.   If you need help with your account or password, call: [phone]. Clinic staff no longer has the ability to change passwords.           Follow-ups after your visit        Follow-up notes from your care team     Return if symptoms worsen or fail to improve.      Who to contact     If you have questions or need follow up information about today's clinic visit or your schedule please contact [facility] directly at [phone].  Normal or non-critical lab and imaging results will be communicated to you by wutabouthart, letter or phone within 4 business days after the clinic has received the results. If you do not hear from us within 7 days, please contact the clinic through Sagebin or phone. If you have a critical or abnormal lab result, we will notify you by phone as soon as possible.  Submit refill requests through Sagebin or call your pharmacy and they will forward the refill request to us. Please allow 3 business days for your refill to be completed.          Additional Information About Your Visit        wutabouthart Information     Sage[employer] gives you secure access to your electronic health record. If you see a primary care provider, you can also send messages to your care team and make appointments. If you have questions, please call your primary care clinic.  If you do not have a primary care provider, please call [phone] and they will assist you.        Care EveryWhere ID     This is your Care EveryWhere ID. This could be used by other organizations to access your [place] medical records  [id number]        Your Vitals Were     Pulse Temperature Height Pulse Oximetry BMI (Body Mass Index)       58 97  F (36.1  C) (Oral) 5' 6.3\" (1.684 m) 99% 28.76 kg/m2        Blood Pressure from Last 3 Encounters:   03/22/17 109/72   02/17/17 128/74   02/08/17 116/79    Weight from Last 3 Encounters: "   03/22/17 179 lb 12.8 oz (81.6 kg)   02/17/17 176 lb (79.8 kg)   02/08/17 176 lb 4.8 oz (80 kg)              We Performed the Following     *UA reflex to Microscopic and Culture ([facility] (except [place] and [place])          Today's Medication Changes          These changes are accurate as of: 3/22/17  1:42 PM.  If you have any questions, ask your nurse or doctor.               These medicines have changed or have updated prescriptions.        Dose/Directions    omeprazole 20 MG CR capsule   Commonly known as:  priLOSEC   This may have changed:    - when to take this  - reasons to take this   Used for:  Gastroesophageal reflux disease without esophagitis        Dose:  20 mg   Take 1 capsule (20 mg) by mouth daily   Quantity:  90 capsule   Refills:  3                Primary Care Provider Office Phone # Fax #    Saravanan Bocanegra [phone]       [facility] [address]  [facility] [address]        Thank you!     Thank you for choosing [facility]  for your care. Our goal is always to provide you with excellent care. Hearing back from our patients is one way we can continue to improve our services. Please take a few minutes to complete the written survey that you may receive in the mail after your visit with us. Thank you!             Your Updated Medication List - Protect others around you: Learn how to safely use, store and throw away your medicines at www.disposemymeds.org.          This list is accurate as of: 3/22/17  1:42 PM.  Always use your most recent med list.                   Brand Name Dispense Instructions for use    aspirin 81 MG tablet     90 tablet    Take 81 mg by mouth as needed       atenolol-chlorthalidone 50-25 MG per tablet    TENORETIC    90 tablet    Take 1 tablet by mouth daily       HYDROcodone-acetaminophen 5-325 MG per tablet    NORCO    42 tablet    Take 1 tablet by mouth every 6 hours as needed for  moderate to severe pain       omeprazole 20 MG CR capsule    priLOSEC    90 capsule    Take 1 capsule (20 mg) by mouth daily       potassium chloride SA 20 MEQ CR tablet    potassium chloride    90 tablet    Take 1 tablet (20 mEq) by mouth daily

## 2017-04-12 ENCOUNTER — TRANSFERRED RECORDS (OUTPATIENT)
Dept: HEALTH INFORMATION MANAGEMENT | Facility: CLINIC | Age: 52
End: 2017-04-12

## 2017-05-04 ENCOUNTER — TRANSFERRED RECORDS (OUTPATIENT)
Dept: HEALTH INFORMATION MANAGEMENT | Facility: CLINIC | Age: 52
End: 2017-05-04

## 2017-05-17 ENCOUNTER — TRANSFERRED RECORDS (OUTPATIENT)
Dept: HEALTH INFORMATION MANAGEMENT | Facility: CLINIC | Age: 52
End: 2017-05-17

## 2017-08-29 ENCOUNTER — TRANSFERRED RECORDS (OUTPATIENT)
Dept: HEALTH INFORMATION MANAGEMENT | Facility: CLINIC | Age: 52
End: 2017-08-29

## 2017-09-17 DIAGNOSIS — I10 ESSENTIAL HYPERTENSION WITH GOAL BLOOD PRESSURE LESS THAN 140/90: ICD-10-CM

## 2017-09-17 NOTE — TELEPHONE ENCOUNTER
atenolol-chlorthalidone (TENORETIC) 50-25 MG per tablet      Last Written Prescription Date: 2/8/17  Last Fill Quantity: 90, # refills: 1    Last Office Visit with [facility], [facility] or [facility] prescribing provider:  3/22/17   Future Office Visit:        BP Readings from Last 3 Encounters:   03/22/17 109/72   02/17/17 128/74   02/08/17 116/79

## 2017-09-19 RX ORDER — ATENOLOL AND CHLORTHALIDONE TABLET 50; 25 MG/1; MG/1
TABLET ORAL
Qty: 30 TABLET | Refills: 6 | Status: SHIPPED | OUTPATIENT
Start: 2017-09-19 | End: 2018-05-15

## 2017-10-18 ENCOUNTER — MYC MEDICAL ADVICE (OUTPATIENT)
Dept: FAMILY MEDICINE | Facility: CLINIC | Age: 52
End: 2017-10-18

## 2017-10-18 DIAGNOSIS — R19.7 DIARRHEA, UNSPECIFIED TYPE: Primary | ICD-10-CM

## 2017-11-24 ENCOUNTER — TRANSFERRED RECORDS (OUTPATIENT)
Dept: HEALTH INFORMATION MANAGEMENT | Facility: CLINIC | Age: 52
End: 2017-11-24

## 2018-01-09 ENCOUNTER — TRANSFERRED RECORDS (OUTPATIENT)
Dept: HEALTH INFORMATION MANAGEMENT | Facility: CLINIC | Age: 53
End: 2018-01-09

## 2018-02-14 ENCOUNTER — TRANSFERRED RECORDS (OUTPATIENT)
Dept: HEALTH INFORMATION MANAGEMENT | Facility: CLINIC | Age: 53
End: 2018-02-14

## 2018-02-26 ENCOUNTER — TELEPHONE (OUTPATIENT)
Dept: FAMILY MEDICINE | Facility: CLINIC | Age: 53
End: 2018-02-26

## 2018-02-26 ENCOUNTER — OFFICE VISIT (OUTPATIENT)
Dept: FAMILY MEDICINE | Facility: CLINIC | Age: 53
End: 2018-02-26
Payer: COMMERCIAL

## 2018-02-26 VITALS
OXYGEN SATURATION: 96 % | TEMPERATURE: 98.2 F | WEIGHT: 181 LBS | DIASTOLIC BLOOD PRESSURE: 73 MMHG | SYSTOLIC BLOOD PRESSURE: 109 MMHG | HEART RATE: 53 BPM | HEIGHT: 66 IN | BODY MASS INDEX: 29.09 KG/M2

## 2018-02-26 DIAGNOSIS — I10 ESSENTIAL HYPERTENSION: ICD-10-CM

## 2018-02-26 DIAGNOSIS — J06.9 UPPER RESPIRATORY TRACT INFECTION, UNSPECIFIED TYPE: ICD-10-CM

## 2018-02-26 DIAGNOSIS — K21.9 GASTROESOPHAGEAL REFLUX DISEASE WITHOUT ESOPHAGITIS: ICD-10-CM

## 2018-02-26 DIAGNOSIS — H10.33 ACUTE CONJUNCTIVITIS OF BOTH EYES, UNSPECIFIED ACUTE CONJUNCTIVITIS TYPE: Primary | ICD-10-CM

## 2018-02-26 PROBLEM — E46 PROTEIN-CALORIE MALNUTRITION (H): Status: RESOLVED | Noted: 2017-03-22 | Resolved: 2018-02-26

## 2018-02-26 LAB
ALBUMIN SERPL-MCNC: 3.8 G/DL (ref 3.4–5)
ALP SERPL-CCNC: 52 U/L (ref 40–150)
ALT SERPL W P-5'-P-CCNC: 56 U/L (ref 0–70)
ANION GAP SERPL CALCULATED.3IONS-SCNC: 8 MMOL/L (ref 3–14)
AST SERPL W P-5'-P-CCNC: 33 U/L (ref 0–45)
BILIRUB SERPL-MCNC: 0.5 MG/DL (ref 0.2–1.3)
BUN SERPL-MCNC: 16 MG/DL (ref 7–30)
CALCIUM SERPL-MCNC: 9.3 MG/DL (ref 8.5–10.1)
CHLORIDE SERPL-SCNC: 104 MMOL/L (ref 94–109)
CO2 SERPL-SCNC: 28 MMOL/L (ref 20–32)
CREAT SERPL-MCNC: 0.85 MG/DL (ref 0.66–1.25)
GFR SERPL CREATININE-BSD FRML MDRD: >90 ML/MIN/1.7M2
GLUCOSE SERPL-MCNC: 102 MG/DL (ref 70–99)
POTASSIUM SERPL-SCNC: 3.3 MMOL/L (ref 3.4–5.3)
PROT SERPL-MCNC: 7.7 G/DL (ref 6.8–8.8)
SODIUM SERPL-SCNC: 140 MMOL/L (ref 133–144)

## 2018-02-26 PROCEDURE — 99214 OFFICE O/P EST MOD 30 MIN: CPT | Performed by: PHYSICIAN ASSISTANT

## 2018-02-26 PROCEDURE — 36415 COLL VENOUS BLD VENIPUNCTURE: CPT | Performed by: PHYSICIAN ASSISTANT

## 2018-02-26 PROCEDURE — 80053 COMPREHEN METABOLIC PANEL: CPT | Performed by: PHYSICIAN ASSISTANT

## 2018-02-26 RX ORDER — FAMOTIDINE 40 MG/1
40 TABLET, FILM COATED ORAL DAILY
Qty: 30 TABLET | Refills: 2 | Status: SHIPPED | OUTPATIENT
Start: 2018-02-26 | End: 2018-03-13

## 2018-02-26 RX ORDER — BENZONATATE 200 MG/1
200 CAPSULE ORAL 3 TIMES DAILY PRN
Qty: 20 CAPSULE | Refills: 0 | Status: SHIPPED | OUTPATIENT
Start: 2018-02-26 | End: 2018-03-04

## 2018-02-26 RX ORDER — POLYMYXIN B SULFATE AND TRIMETHOPRIM 1; 10000 MG/ML; [USP'U]/ML
1 SOLUTION OPHTHALMIC
Qty: 1 BOTTLE | Refills: 0 | Status: SHIPPED | OUTPATIENT
Start: 2018-02-26 | End: 2018-03-05

## 2018-02-26 ASSESSMENT — PAIN SCALES - GENERAL: PAINLEVEL: NO PAIN (0)

## 2018-02-26 NOTE — TELEPHONE ENCOUNTER
This writer attempted to contact Thanh on 02/26/18    Reason for call: triage symptoms and left message that clinic will return call.    If patient calls back:    Patient contacted by clinic RN team. Inform patient that someone from the team will contact them, document that patient called and route to care team.     Yobany Ha RN, BSN

## 2018-02-26 NOTE — TELEPHONE ENCOUNTER
Reason for call:  Patient reporting a symptom    Symptom or request: Flu    Duration (how long have symptoms been present): on going    Have you been treated for this before? No    Additional comments: Pt calling to report flu symptoms and has an appointment with Ernesto Holland this morning.    Phone Number patient can be reached at:  Home number on file [phone] (home)    Best Time:  anytime    Can we leave a detailed message on this number:  YES    Call taken on 2/26/2018 at 7:50 AM by Chirag Calvin

## 2018-02-26 NOTE — MR AVS SNAPSHOT
After Visit Summary   2/26/2018    Thanh Loaiza    MRN: [id number]           Patient Information     Date Of Birth          1965        Visit Information        Provider Department      2/26/2018 10:00 AM Ernesto Holland PA [facility]        Today's Diagnoses     Acute conjunctivitis of both eyes, unspecified acute conjunctivitis type    -  1    Essential hypertension        Gastroesophageal reflux disease without esophagitis        Upper respiratory tract infection, unspecified type          Care Instructions    At [facility], we strive to deliver an exceptional experience to you, every time we see you.  If you receive a survey in the mail, please send us back your thoughts. We really do value your feedback.    Based on your medical history, these are the current health maintenance/preventive care services that you are due for (some may have been done at this visit.)  Health Maintenance Due   Topic Date Due     INFLUENZA VACCINE (SYSTEM ASSIGNED)  09/01/2017     BMP Q1 YR  02/08/2018         Suggested websites for health information:  Www.Com[employer] Software.Iframe Apps : Up to date and easily searchable information on multiple topics.  Www.medlineplus.gov : medication info, interactive tutorials, watch real surgeries online  Www.familydoctor.org : good info from the Academy of Family Physicians  Www.cdc.gov : public health info, travel advisories, epidemics (H1N1)  Www.aap.org : children's health info, normal development, vaccinations  Www.health.state[place].us : [place] dept of health, public health issues in [place], N1N1    Your care team:                            Family Medicine Internal Medicine   MD Alexy Giraldo MD Shantel Branch-Fleming, MD Katya Georgiev PA-C Megan Hill, APRN MADY Bocanegra MD Pediatrics   GOPI Gale, MADY Higgins APRN CNP   MD Martha Garcia MD Deborah Mielke, MD Kim  RIKI Odom CNP      Clinic hours: Monday - Thursday 7 am-7 pm; Fridays 7 am-5 pm.   Urgent care: Monday - Friday 11 am-9 pm; Saturday and Sunday 9 am-5 pm.  Pharmacy : Monday -Thursday 8 am-8 pm; Friday 8 am-6 pm; Saturday and Sunday 9 am-5 pm.     Clinic: [phone]   Pharmacy: [phone]     Try over the counter antihistamine drops (eg.ketotifen) and artificial tears and if not improving or significant eye discharge occurs, start the antibiotic drops.      Return to clinic or Emergency Department for fevers, vision loss, or worsening symptoms.    Conjunctivitis Caused by Infection  Infections are caused by viruses or bacteria. Treatment includes keeping your eyes and hands clean. Your doctor may prescribe eyedrops, and tell you to stay home from work or school if you re contagious. Untreated infections can be serious, so it s important that a doctor diagnoses you    Viral Infections  A cold, flu, or other virus can spread to the eyes. This causes a watery discharge. The eyes may burn or itch and get red. The eyelids may also be puffy and sore.  Treating the infections. Most viral infections go away on their own. Artificial tears, over the counter antihistamine eye drops, and warm compresses can relieve symptoms. Your doctor may also prescribe eyedrops. A viral infection can be very contagious and spreads quickly. To prevent this, wash your hands often. Use a separate tissue to wipe each eye. Don t touch your eyes or share bedding or towels.  Bacterial Infections  Bacterial infections often occur in one eye. There may be a watery or a thick discharge from the eye. These infections can cause serious damage to the eye if not treated promptly.  Treating the infections. Your doctor may prescribe eyedrops or ointment to kill the bacteria. Warm compresses can help keep the eyelids clean. To keep the bacteria from spreading, wash your hands often. Use a separate tissue to wipe each eye. Don t touch your eyes or  share bedding or towels.    6035-3540 [employer], [address] [place] [address]. All rights reserved. This information is not intended as a substitute for professional medical care. Always follow your healthcare professional's instructions.          Tips to Control Acid Reflux    To control acid reflux, you ll need to make some basic diet and lifestyle changes. The simple steps outlined below may be all you ll need to ease discomfort.  Watch what you eat    Avoid fatty foods and spicy foods.    Eat fewer acidic foods, such as citrus and tomato-based foods. These can increase symptoms.    Limit drinking alcohol, caffeine, and fizzy beverages. All increase acid reflux.    Try limiting chocolate, peppermint, and spearmint. These can worsen acid reflux in some people.  Watch when you eat    Avoid lying down for 3 hours after eating.    Do not snack before going to bed.  Raise your head  Raising your head and upper body by 4 to 6 inches helps limit reflux when you re lying down. Put blocks under the head of your bed frame to raise it.  Other changes    Lose weight, if you need to    Don t exercise near bedtime    Avoid tight-fitting clothes    Limit aspirin and ibuprofen    Stop smoking   Date Last Reviewed: 7/1/2016 2000-2017 [employer]. [address] [place] [address]. All rights reserved. This information is not intended as a substitute for professional medical care. Always follow your healthcare professional's instructions.      Trial over the counter symptomatic relief, rest, and drink plenty of fluids. Salt water gargles and nasal lavage may also be helpful.  Return to clinic or Emergency Department for breathing/swallowing problems, fever >105, altered mental status, or worsening general condition.      Viral Respiratory Illness:  You have an Upper Respiratory Illness (URI) caused by a virus. This illness is contagious during the first few days. It is spread through the  air by coughing and sneezing or by direct contact (touching the sick person and then touching your own eyes, nose or mouth). Most viral illnesses go away within 7-10 days with rest and simple home remedies. Sometimes, the illness may last for several weeks. Antibiotics will not kill a virus and are generally not prescribed for this condition.  Home Care:  1) If symptoms are severe, rest at home for the first 2-3 days. When you resume activity, don't let yourself get too tired.  2) Avoid being exposed to cigarette smoke (yours or others ).  3) Tylenol (acetaminophen) or ibuprofen (Advil, Motrin) will help fever, muscle aching and headache. (Persons under 18 with fever should not take aspirin since this may cause liver damage.)  4) Your appetite may be poor, so a light diet is fine. Avoid dehydration by drinking 6-8 glasses of fluids per day (water, soft, drinks, juices, tea, soup, etc.). Extra fluids will help loosen secretions in the nose and lungs.  5) Over-the-counter cold medicines will not shorten the length of time you re sick, but they may be helpful for the following symptoms: cough (Robitussin DM); sore throat (Chloraseptic lozenges or spray); nasal and sinus congestion (Actifed, Sudafed, Chlortrimeton).  Follow Up  with your doctor or as advised if you don t improve over the next week.  Get Prompt Medical Attention  if any of the following occur:  -- Cough with lots of colored sputum (mucus) or blood in your sputum  -- Chest pain, shortness of breath, wheezing or have trouble breathing  -- Severe headache; face, neck or ear pain  -- Fever over 100.4  F (38.0  C) for more than three days  -- You can t swallow due to throat pain    3818-7492 [facility], [address] [place] [address], [place] [address]. All rights reserved. This information is not intended as a substitute for professional medical care. Always follow your healthcare professional's instructions.                Follow-ups after your visit       "  Follow-up notes from your care team     Return if symptoms worsen or fail to improve.      Future tests that were ordered for you today     Open Future Orders        Priority Expected Expires Ordered    H Pylori antigen stool Routine  3/28/2018 2/26/2018            Who to contact     If you have questions or need follow up information about today's clinic visit or your schedule please contact [facility] directly at [phone].  Normal or non-critical lab and imaging results will be communicated to you by Exchange [employer]hart, letter or phone within 4 business days after the clinic has received the results. If you do not hear from us within 7 days, please contact the clinic through [employer] or phone. If you have a critical or abnormal lab result, we will notify you by phone as soon as possible.  Submit refill requests through [employer] or call your pharmacy and they will forward the refill request to us. Please allow 3 business days for your refill to be completed.          Additional Information About Your Visit        Exchange LabharEcoFactor Information     [employer] gives you secure access to your electronic health record. If you see a primary care provider, you can also send messages to your care team and make appointments. If you have questions, please call your primary care clinic.  If you do not have a primary care provider, please call [phone] and they will assist you.        Care EveryWhere ID     This is your Care EveryWhere ID. This could be used by other organizations to access your [facility] medical records  [id number]        Your Vitals Were     Pulse Temperature Height Pulse Oximetry BMI (Body Mass Index)       53 98.2  F (36.8  C) (Oral) 5' 6.3\" (1.684 m) 96% 28.95 kg/m2        Blood Pressure from Last 3 Encounters:   02/26/18 109/73   03/22/17 109/72   02/17/17 128/74    Weight from Last 3 Encounters:   02/26/18 181 lb (82.1 kg)   03/22/17 179 lb 12.8 oz (81.6 kg)   02/17/17 176 lb (79.8 kg)            "   We Performed the Following     Comprehensive metabolic panel          Today's Medication Changes          These changes are accurate as of 2/26/18 10:32 AM.  If you have any questions, ask your nurse or doctor.               Start taking these medicines.        Dose/Directions    benzonatate 200 MG capsule   Commonly known as:  TESSALON   Used for:  Upper respiratory tract infection, unspecified type   Started by:  Ernesto Holland PA        Dose:  200 mg   Take 1 capsule (200 mg) by mouth 3 times daily as needed for cough   Quantity:  20 capsule   Refills:  0       famotidine 40 MG tablet   Commonly known as:  PEPCID   Used for:  Gastroesophageal reflux disease without esophagitis   Started by:  Ernesto Holland PA        Dose:  40 mg   Take 1 tablet (40 mg) by mouth daily   Quantity:  30 tablet   Refills:  2       trimethoprim-polymyxin b ophthalmic solution   Commonly known as:  POLYTRIM   Used for:  Acute conjunctivitis of both eyes, unspecified acute conjunctivitis type   Started by:  Ernesto Holland PA        Dose:  1 drop   Apply 1 drop to eye every 3 hours for 7 days   Quantity:  1 Bottle   Refills:  0         These medicines have changed or have updated prescriptions.        Dose/Directions    omeprazole 20 MG CR capsule   Commonly known as:  priLOSEC   This may have changed:    - when to take this  - reasons to take this   Used for:  Gastroesophageal reflux disease without esophagitis        Dose:  20 mg   Take 1 capsule (20 mg) by mouth daily   Quantity:  90 capsule   Refills:  3            Where to get your medicines      These medications were sent to [id number] - [place], [place] - [address]  [address] [facility] [address]     Phone:  [phone]     benzonatate 200 MG capsule    famotidine 40 MG tablet    trimethoprim-polymyxin b ophthalmic solution                Primary Care Provider Office Phone # Fax #    Saravanan Bocanegra [phone]  [phone]       [address]  [facility] [address]        Equal Access to Services     KHUSHBU FORD : Hadii aad ku hadsilverioo Soshahriarali, waaxda luqadaha, qaybta kaalmada adeegmaggieda, corwin rothman bobbyjohanna negretekeenan megaizzyilya rolon. So [facility] [phone].    ATENCIÓN: Si habla español, tiene a powers disposición servicios gratuitos de asistencia lingüística. Llame al [phone].    We comply with applicable federal civil rights laws and [place] laws. We do not discriminate on the basis of race, color, national origin, age, disability, sex, sexual orientation, or gender identity.            Thank you!     Thank you for choosing [facility]  for your care. Our goal is always to provide you with excellent care. Hearing back from our patients is one way we can continue to improve our services. Please take a few minutes to complete the written survey that you may receive in the mail after your visit with us. Thank you!             Your Updated Medication List - Protect others around you: Learn how to safely use, store and throw away your medicines at www.disposemymeds.org.          This list is accurate as of 2/26/18 10:32 AM.  Always use your most recent med list.                   Brand Name Dispense Instructions for use Diagnosis    aspirin 81 MG tablet     90 tablet    Take 81 mg by mouth as needed        atenolol-chlorthalidone 50-25 MG per tablet    TENORETIC    30 tablet    TAKE ONE TABLET BY MOUTH ONE TIME DAILY    Essential hypertension with goal blood pressure less than 140/90       benzonatate 200 MG capsule    TESSALON    20 capsule    Take 1 capsule (200 mg) by mouth 3 times daily as needed for cough    Upper respiratory tract infection, unspecified type       famotidine 40 MG tablet    PEPCID    30 tablet    Take 1 tablet (40 mg) by mouth daily    Gastroesophageal reflux disease without esophagitis       HYDROcodone-acetaminophen 5-325 MG per tablet    NORCO    42 tablet    Take 1 tablet by  mouth every 6 hours as needed for moderate to severe pain    Osteoarthritis of spine with radiculopathy, lumbar region       omeprazole 20 MG CR capsule    priLOSEC    90 capsule    Take 1 capsule (20 mg) by mouth daily    Gastroesophageal reflux disease without esophagitis       potassium chloride SA 20 MEQ CR tablet    KLOR-CON    90 tablet    Take 1 tablet (20 mEq) by mouth daily    Hypokalemia       trimethoprim-polymyxin b ophthalmic solution    POLYTRIM    1 Bottle    Apply 1 drop to eye every 3 hours for 7 days    Acute conjunctivitis of both eyes, unspecified acute conjunctivitis type

## 2018-02-26 NOTE — PATIENT INSTRUCTIONS
At [facility], we strive to deliver an exceptional experience to you, every time we see you.  If you receive a survey in the mail, please send us back your thoughts. We really do value your feedback.    Based on your medical history, these are the current health maintenance/preventive care services that you are due for (some may have been done at this visit.)  Health Maintenance Due   Topic Date Due     INFLUENZA VACCINE (SYSTEM ASSIGNED)  09/01/2017     BMP Q1 YR  02/08/2018         Suggested websites for health information:  Www.WoraPay.eKonnekt : Up to date and easily searchable information on multiple topics.  Www.Caregivers.gov : medication info, interactive tutorials, watch real surgeries online  Www.familydoctor.org : good info from the Academy of Family Physicians  Www.cdc.gov : public health info, travel advisories, epidemics (H1N1)  Www.aap.org : children's health info, normal development, vaccinations  Www.health.[facility].[place].us : [place] dept of health, public health issues in [place], N1N1    Your care team:                            Family Medicine Internal Medicine   MD Alexy Giraldo MD Shantel Branch-Fleming, MD Katya Georgiev PA-C Megan Hill, APRN CNP    Saravanan Bocanegra MD Pediatrics   GOPI Gale, MADY Higgins APRN CNP   MD Martha Garcia MD Deborah Mielke, MD Kim Thein, APRN [facility]      Clinic hours: Monday - Thursday 7 am-7 pm; Fridays 7 am-5 pm.   Urgent care: Monday - Friday 11 am-9 pm; Saturday and Sunday 9 am-5 pm.  Pharmacy : Monday -Thursday 8 am-8 pm; Friday 8 am-6 pm; Saturday and Sunday 9 am-5 pm.     Clinic: [phone]   Pharmacy: [phone]     Try over the counter antihistamine drops (eg.ketotifen) and artificial tears and if not improving or significant eye discharge occurs, start the antibiotic drops.      Return to clinic or Emergency Department for fevers, vision loss, or worsening symptoms.     Conjunctivitis Caused by Infection  Infections are caused by viruses or bacteria. Treatment includes keeping your eyes and hands clean. Your doctor may prescribe eyedrops, and tell you to stay home from work or school if you re contagious. Untreated infections can be serious, so it s important that a doctor diagnoses you    Viral Infections  A cold, flu, or other virus can spread to the eyes. This causes a watery discharge. The eyes may burn or itch and get red. The eyelids may also be puffy and sore.  Treating the infections. Most viral infections go away on their own. Artificial tears, over the counter antihistamine eye drops, and warm compresses can relieve symptoms. Your doctor may also prescribe eyedrops. A viral infection can be very contagious and spreads quickly. To prevent this, wash your hands often. Use a separate tissue to wipe each eye. Don t touch your eyes or share bedding or towels.  Bacterial Infections  Bacterial infections often occur in one eye. There may be a watery or a thick discharge from the eye. These infections can cause serious damage to the eye if not treated promptly.  Treating the infections. Your doctor may prescribe eyedrops or ointment to kill the bacteria. Warm compresses can help keep the eyelids clean. To keep the bacteria from spreading, wash your hands often. Use a separate tissue to wipe each eye. Don t touch your eyes or share bedding or towels.    3352-0501 [address] [address]. All rights reserved. This information is not intended as a substitute for professional medical care. Always follow your healthcare professional's instructions.          Tips to Control Acid Reflux    To control acid reflux, you ll need to make some basic diet and lifestyle changes. The simple steps outlined below may be all you ll need to ease discomfort.  Watch what you eat    Avoid fatty foods and spicy foods.    Eat fewer acidic foods, such as citrus and  tomato-based foods. These can increase symptoms.    Limit drinking alcohol, caffeine, and fizzy beverages. All increase acid reflux.    Try limiting chocolate, peppermint, and spearmint. These can worsen acid reflux in some people.  Watch when you eat    Avoid lying down for 3 hours after eating.    Do not snack before going to bed.  Raise your head  Raising your head and upper body by 4 to 6 inches helps limit reflux when you re lying down. Put blocks under the head of your bed frame to raise it.  Other changes    Lose weight, if you need to    Don t exercise near bedtime    Avoid tight-fitting clothes    Limit aspirin and ibuprofen    Stop smoking   Date Last Reviewed: 7/1/2016 2000-2017 [employer]. [address] [place] [address], [place] [address]. All rights reserved. This information is not intended as a substitute for professional medical care. Always follow your healthcare professional's instructions.      Trial over the counter symptomatic relief, rest, and drink plenty of fluids. Salt water gargles and nasal lavage may also be helpful.  Return to clinic or Emergency Department for breathing/swallowing problems, fever >105, altered mental status, or worsening general condition.      Viral Respiratory Illness:  You have an Upper Respiratory Illness (URI) caused by a virus. This illness is contagious during the first few days. It is spread through the air by coughing and sneezing or by direct contact (touching the sick person and then touching your own eyes, nose or mouth). Most viral illnesses go away within 7-10 days with rest and simple home remedies. Sometimes, the illness may last for several weeks. Antibiotics will not kill a virus and are generally not prescribed for this condition.  Home Care:  1) If symptoms are severe, rest at home for the first 2-3 days. When you resume activity, don't let yourself get too tired.  2) Avoid being exposed to cigarette smoke (yours or others ).  3) Tylenol  (acetaminophen) or ibuprofen (Advil, Motrin) will help fever, muscle aching and headache. (Persons under 18 with fever should not take aspirin since this may cause liver damage.)  4) Your appetite may be poor, so a light diet is fine. Avoid dehydration by drinking 6-8 glasses of fluids per day (water, soft, drinks, juices, tea, soup, etc.). Extra fluids will help loosen secretions in the nose and lungs.  5) Over-the-counter cold medicines will not shorten the length of time you re sick, but they may be helpful for the following symptoms: cough (Robitussin DM); sore throat (Chloraseptic lozenges or spray); nasal and sinus congestion (Actifed, Sudafed, Chlortrimeton).  Follow Up  with your doctor or as advised if you don t improve over the next week.  Get Prompt Medical Attention  if any of the following occur:  -- Cough with lots of colored sputum (mucus) or blood in your sputum  -- Chest pain, shortness of breath, wheezing or have trouble breathing  -- Severe headache; face, neck or ear pain  -- Fever over 100.4  F (38.0  C) for more than three days  -- You can t swallow due to throat pain    9307-0879 [facility], [address] [place] [address], [place] [address]. All rights reserved. This information is not intended as a substitute for professional medical care. Always follow your healthcare professional's instructions.

## 2018-02-26 NOTE — LETTER
February 26, 2018      Thanh Loaiza  [address]  [place] [address]        Mr. Loaiza,    Your blood test was normal except your potassium was a little low. Make sure you are taking your potassium daily.    Please contact the clinic if you have additional questions or if symptoms persist.  Thank you.    Sincerely,      Ernesto Holland/jie    Resulted Orders   Comprehensive metabolic panel   Result Value Ref Range    Sodium 140 133 - 144 mmol/L    Potassium 3.3 (L) 3.4 - 5.3 mmol/L    Chloride 104 94 - 109 mmol/L    Carbon Dioxide 28 20 - 32 mmol/L    Anion Gap 8 3 - 14 mmol/L    Glucose 102 (H) 70 - 99 mg/dL      Comment:      Non Fasting    Urea Nitrogen 16 7 - 30 mg/dL    Creatinine 0.85 0.66 - 1.25 mg/dL    GFR Estimate >90 >60 mL/min/1.7m2      Comment:      Non  GFR Calc    GFR Estimate If Black >90 >60 mL/min/1.7m2      Comment:       GFR Calc    Calcium 9.3 8.5 - 10.1 mg/dL    Bilirubin Total 0.5 0.2 - 1.3 mg/dL    Albumin 3.8 3.4 - 5.0 g/dL    Protein Total 7.7 6.8 - 8.8 g/dL    Alkaline Phosphatase 52 40 - 150 U/L    ALT 56 0 - 70 U/L    AST 33 0 - 45 U/L       If you have any questions or concerns, please call the clinic at the number listed above.       Sincerely,        ROLY Paula

## 2018-02-26 NOTE — PROGRESS NOTES
SUBJECTIVE:   Thanh Loaiza is a 52 year old male who presents to clinic today for the following health issues:      ENT Symptoms             Symptoms: cc Present Absent Comment   Fever/Chills  x     Fatigue   x    Muscle Aches   x    Eye Irritation  x  Left   Sneezing   x    Nasal Luis/Drg  x     Sinus Pressure/Pain   x    Loss of smell   x    Dental pain   x    Sore Throat  x  At night   Swollen Glands   x    Ear Pain/Fullness   x    Cough  x     Wheeze  x     Chest Pain   x    Shortness of breath   x    Rash   x    Other         Symptom duration:  about 3-4 days   Symptom severity:  moderate   Treatments tried:  Advil   Contacts:        cold symptoms aren't too bad but concerned about pink eye.      Also reports for past month or so, having hunger pains in the morning, gets better with eating, has been having baseline GERD related pressure.  Not on PPI            No Known Allergies    Patient Active Problem List   Diagnosis     Erectile dysfunction     CARDIOVASCULAR SCREENING; LDL GOAL LESS THAN 130     Disorder of sacrum     Lumbago     Essential hypertension     DJD (degenerative joint disease), lumbar     Cataract extraction status of right eye     Gastroesophageal reflux disease without esophagitis       Past Medical History:   Diagnosis Date     DDD (degenerative disc disease), lumbar      DJD (degenerative joint disease), lumbar      Hypertension goal BP (blood pressure) < 140/90 12/14/2010         Current Outpatient Prescriptions on File Prior to Visit:  atenolol-chlorthalidone (TENORETIC) 50-25 MG per tablet TAKE ONE TABLET BY MOUTH ONE TIME DAILY    potassium chloride SA (POTASSIUM CHLORIDE) 20 MEQ CR tablet Take 1 tablet (20 mEq) by mouth daily   HYDROcodone-acetaminophen (NORCO) 5-325 MG per tablet Take 1 tablet by mouth every 6 hours as needed for moderate to severe pain   omeprazole (PRILOSEC) 20 MG capsule Take 1 capsule (20 mg) by mouth daily (Patient taking differently: Take 20 mg by mouth as  "needed )   aspirin 81 MG tablet Take 81 mg by mouth as needed      No current facility-administered medications on file prior to visit.     Social History   Substance Use Topics     Smoking status: Never Smoker     Smokeless tobacco: Never Used     Alcohol use No       Family History   Problem Relation Age of Onset     Hypertension Mother      DIABETES Father      Hypertension Father      Hypertension Brother        ROS:  Consitutional: As above  ENT: As above  Respiratory: As above    OBJECTIVE:  /73 (BP Location: Right arm, Patient Position: Chair, Cuff Size: Adult Regular)  Pulse 53  Temp 98.2  F (36.8  C) (Oral)  Ht 5' 6.3\" (1.684 m)  Wt 181 lb (82.1 kg)  SpO2 96%  BMI 28.95 kg/m2  GENERAL APPEARANCE: healthy, alert and no distress  EYES: conjunctiva clear  HENT:   TMs w/o erythema, effusion or bulging.  Nose and mouth without ulcers, erythema or lesions.  NO tonsillar enlargement erythema or exudates.   NECK: supple, nontender, no lymphadenopathy  RESP: lungs clear to auscultation - no rales, rhonchi or wheezes  CV: regular rates and rhythm, normal S1 S2, no murmur noted  NEURO: awake, alert    Abd_ soft, nonttp, 1+ bowel soudns        ASSESSMENT: Well appearing.    ICD-10-CM    1. Acute conjunctivitis of both eyes, unspecified acute conjunctivitis type H10.33 trimethoprim-polymyxin b (POLYTRIM) ophthalmic solution   2. Essential hypertension I10 Comprehensive metabolic panel   3. Gastroesophageal reflux disease without esophagitis K21.9 H Pylori antigen stool     famotidine (PEPCID) 40 MG tablet   4. Upper respiratory tract infection, unspecified type J06.9 benzonatate (TESSALON) 200 MG capsule         PLAN:delayed prn ABXs  Lots of rest and fluids.  RTC if any worsening symptoms or if not improving.    Xavier Holland PA-C           "

## 2018-02-26 NOTE — PROGRESS NOTES
Mr. Loaiza,    Your blood test was normal except your potassium was a little low. Make sure you are taking your potassium daily.    Please contact the clinic if you have additional questions or if symptoms persist.  Thank you.    Sincerely,    Ernesto Holland

## 2018-02-28 DIAGNOSIS — K21.9 GASTROESOPHAGEAL REFLUX DISEASE WITHOUT ESOPHAGITIS: ICD-10-CM

## 2018-02-28 PROCEDURE — 87338 HPYLORI STOOL AG IA: CPT | Performed by: PHYSICIAN ASSISTANT

## 2018-03-01 ENCOUNTER — TELEPHONE (OUTPATIENT)
Dept: FAMILY MEDICINE | Facility: CLINIC | Age: 53
End: 2018-03-01

## 2018-03-01 ENCOUNTER — MYC MEDICAL ADVICE (OUTPATIENT)
Dept: FAMILY MEDICINE | Facility: CLINIC | Age: 53
End: 2018-03-01

## 2018-03-01 DIAGNOSIS — A04.8 H. PYLORI INFECTION: Primary | ICD-10-CM

## 2018-03-01 LAB
H PYLORI AG STL QL IA: ABNORMAL
SPECIMEN SOURCE: ABNORMAL

## 2018-03-01 RX ORDER — AMOXICILLIN 500 MG/1
1000 CAPSULE ORAL 2 TIMES DAILY
Qty: 56 CAPSULE | Refills: 0 | Status: SHIPPED | OUTPATIENT
Start: 2018-03-01 | End: 2018-03-15

## 2018-03-01 RX ORDER — CLARITHROMYCIN 500 MG
500 TABLET ORAL 2 TIMES DAILY
Qty: 28 TABLET | Refills: 0 | Status: SHIPPED | OUTPATIENT
Start: 2018-03-01 | End: 2018-03-15

## 2018-03-02 ENCOUNTER — OFFICE VISIT (OUTPATIENT)
Dept: URGENT CARE | Facility: URGENT CARE | Age: 53
End: 2018-03-02
Payer: COMMERCIAL

## 2018-03-02 ENCOUNTER — RADIANT APPOINTMENT (OUTPATIENT)
Dept: GENERAL RADIOLOGY | Facility: CLINIC | Age: 53
End: 2018-03-02
Attending: NURSE PRACTITIONER
Payer: COMMERCIAL

## 2018-03-02 VITALS
SYSTOLIC BLOOD PRESSURE: 114 MMHG | RESPIRATION RATE: 14 BRPM | WEIGHT: 183 LBS | BODY MASS INDEX: 29.27 KG/M2 | HEART RATE: 67 BPM | TEMPERATURE: 98.2 F | DIASTOLIC BLOOD PRESSURE: 78 MMHG

## 2018-03-02 DIAGNOSIS — M25.561 ACUTE PAIN OF RIGHT KNEE: Primary | ICD-10-CM

## 2018-03-02 DIAGNOSIS — M25.561 ACUTE PAIN OF RIGHT KNEE: ICD-10-CM

## 2018-03-02 PROCEDURE — 73565 X-RAY EXAM OF KNEES: CPT | Mod: FY

## 2018-03-02 PROCEDURE — 99213 OFFICE O/P EST LOW 20 MIN: CPT | Performed by: NURSE PRACTITIONER

## 2018-03-02 NOTE — PROGRESS NOTES
SUBJECTIVE:                                                    Thanh Loaiza is a 52 year old male who presents to clinic today for the following health issues:      Musculoskeletal problem/pain      Duration: 3 day    Description  Location: right knee    Intensity:  moderate    Accompanying signs and symptoms: numbness and tingling    History  Previous similar problem: no   Previous evaluation:  none    Precipitating or alleviating factors:  Trauma or overuse: YES  Aggravating factors include: standing and walking    Therapies tried and outcome: heat, ice and ibuprofen        Problem list and histories reviewed & adjusted, as indicated.  Additional history: as documented    Patient Active Problem List   Diagnosis     Erectile dysfunction     CARDIOVASCULAR SCREENING; LDL GOAL LESS THAN 130     Disorder of sacrum     Lumbago     Essential hypertension     DJD (degenerative joint disease), lumbar     Cataract extraction status of right eye     Gastroesophageal reflux disease without esophagitis     H. pylori infection     Past Surgical History:   Procedure Laterality Date     COLONOSCOPY  10/14/2011    Procedure:COLONOSCOPY; Colonoscopy, change in stools, diarrhea; Surgeon:ALEIDA DELEON; Location:[facility] OR     COLONOSCOPY  10/14/2011    Procedure:COMBINED COLONOSCOPY, SINGLE BIOPSY/POLYPECTOMY BY BIOPSY; Surgeon:ALEIDA DELEON; Location:[facility] OR     ORTHOPEDIC SURGERY       TONSILLECTOMY      age 11 years       Social History   Substance Use Topics     Smoking status: Never Smoker     Smokeless tobacco: Never Used     Alcohol use No     Family History   Problem Relation Age of Onset     Hypertension Mother      DIABETES Father      Hypertension Father      Hypertension Brother          Current Outpatient Prescriptions   Medication Sig Dispense Refill     clarithromycin (BIAXIN) 500 MG tablet Take 1 tablet (500 mg) by mouth 2 times daily for 14 days 28 tablet 0     omeprazole (PRILOSEC) 20 MG CR capsule Take 1 capsule  (20 mg) by mouth 2 times daily for 14 days 28 capsule 0     amoxicillin (AMOXIL) 500 MG capsule Take 2 capsules (1,000 mg) by mouth 2 times daily for 14 days 56 capsule 0     probiotic CAPS VSL Probiotic- Take 2 caps daily while on antibiotics 30 capsule 0     famotidine (PEPCID) 40 MG tablet Take 1 tablet (40 mg) by mouth daily 30 tablet 2     benzonatate (TESSALON) 200 MG capsule Take 1 capsule (200 mg) by mouth 3 times daily as needed for cough 20 capsule 0     trimethoprim-polymyxin b (POLYTRIM) ophthalmic solution Apply 1 drop to eye every 3 hours for 7 days 1 Bottle 0     atenolol-chlorthalidone (TENORETIC) 50-25 MG per tablet TAKE ONE TABLET BY MOUTH ONE TIME DAILY  30 tablet 6     potassium chloride SA (POTASSIUM CHLORIDE) 20 MEQ CR tablet Take 1 tablet (20 mEq) by mouth daily 90 tablet 1     HYDROcodone-acetaminophen (NORCO) 5-325 MG per tablet Take 1 tablet by mouth every 6 hours as needed for moderate to severe pain 42 tablet 0     omeprazole (PRILOSEC) 20 MG capsule Take 1 capsule (20 mg) by mouth daily (Patient taking differently: Take 20 mg by mouth as needed ) 90 capsule 3     aspirin 81 MG tablet Take 81 mg by mouth as needed  90 tablet 3     No Known Allergies    ROS:  Constitutional, HEENT, cardiovascular, pulmonary, gi and gu systems are negative, except as otherwise noted.    OBJECTIVE:     /78  Pulse 67  Temp 98.2  F (36.8  C)  Resp 14  Wt 183 lb (83 kg)  BMI 29.27 kg/m2  Body mass index is 29.27 kg/(m^2).  GENERAL: healthy, alert and no distress  NECK: no adenopathy, no asymmetry, masses, or scars and thyroid normal to palpation  RESP: lungs clear to auscultation - no rales, rhonchi or wheezes  CV: regular rate and rhythm, normal S1 S2, no S3 or S4, no murmur, click or rub, no peripheral edema and peripheral pulses strong  ABDOMEN: soft, nontender, no hepatosplenomegaly, no masses and bowel sounds normal  MS: no gross musculoskeletal defects noted, no edema    Diagnostic Test  Results:  XR KNEE AP STANDING BILATERAL 3/2/2018 5:56 PM     HISTORY: Pain.     COMPARISON: None.         IMPRESSION: No evidence of acute fracture or malalignment of the  bilateral knees.     FRAN POPE MD    ASSESSMENT/PLAN:     (M25.561) Acute pain of right knee  (primary encounter diagnosis)  Comment:   Plan: XR Knee AP Standing Bilateral, ORTHOPEDICS         ADULT REFERRAL for further recommendation if needed. Otherwise, ice, heat, elevate, ibuprofen       RIKI Chavez [facility]

## 2018-03-02 NOTE — MR AVS SNAPSHOT
After Visit Summary   3/2/2018    Thanh Loaiza    MRN: [id number]           Patient Information     Date Of Birth          1965        Visit Information        Provider Department      3/2/2018 5:20 PM Krystal Linda APRN CNP [facility]        Today's Diagnoses     Acute pain of right knee    -  1       Follow-ups after your visit        Additional Services     ORTHOPEDICS ADULT REFERRAL       Your provider has referred you to: [facility]: [facility] [phone]    [email or web address]    Please be aware that coverage of these services is subject to the terms and limitations of your health insurance plan.  Call member services at your health plan with any benefit or coverage questions.      Please bring the following to your appointment:    >>   Any x-rays, CTs or MRIs which have been performed.  Contact the facility where they were done to arrange for  prior to your scheduled appointment.    >>   List of current medications   >>   This referral request   >>   Any documents/labs given to you for this referral                  Who to contact     If you have questions or need follow up information about today's clinic visit or your schedule please contact [facility] directly at [phone].  Normal or non-critical lab and imaging results will be communicated to you by MyChart, letter or phone within 4 business days after the clinic has received the results. If you do not hear from us within 7 days, please contact the clinic through MyChart or phone. If you have a critical or abnormal lab result, we will notify you by phone as soon as possible.  Submit refill requests through [employer] or call your pharmacy and they will forward the refill request to us. Please allow 3 business days for your refill to be completed.          Additional Information About Your Visit        MyChart Information      [employer] gives you secure access to your electronic health record. If you see a primary care provider, you can also send messages to your care team and make appointments. If you have questions, please call your primary care clinic.  If you do not have a primary care provider, please call [phone] and they will assist you.        Care EveryWhere ID     This is your Care EveryWhere ID. This could be used by other organizations to access your [facility] medical records  [id number]        Your Vitals Were     Pulse Temperature Respirations BMI (Body Mass Index)          67 98.2  F (36.8  C) 14 29.27 kg/m2         Blood Pressure from Last 3 Encounters:   03/02/18 114/78   02/26/18 109/73   03/22/17 109/72    Weight from Last 3 Encounters:   03/02/18 183 lb (83 kg)   02/26/18 181 lb (82.1 kg)   03/22/17 179 lb 12.8 oz (81.6 kg)              We Performed the Following     ORTHOPEDICS ADULT REFERRAL          Today's Medication Changes          These changes are accurate as of 3/2/18  6:09 PM.  If you have any questions, ask your nurse or doctor.               These medicines have changed or have updated prescriptions.        Dose/Directions    * omeprazole 20 MG CR capsule   Commonly known as:  priLOSEC   This may have changed:    - when to take this  - reasons to take this   Used for:  Gastroesophageal reflux disease without esophagitis        Dose:  20 mg   Take 1 capsule (20 mg) by mouth daily   Quantity:  90 capsule   Refills:  3       * omeprazole 20 MG CR capsule   Commonly known as:  priLOSEC   This may have changed:  Another medication with the same name was changed. Make sure you understand how and when to take each.   Used for:  H. pylori infection        Dose:  20 mg   Take 1 capsule (20 mg) by mouth 2 times daily for 14 days   Quantity:  28 capsule   Refills:  0       * Notice:  This list has 2 medication(s) that are the same as other medications prescribed for you. Read the directions carefully, and ask  your doctor or other care provider to review them with you.             Primary Care Provider Office Phone # Fax #    Saravanan Bocanegra [phone]       [address]  [facility] [address]        Equal Access to Services     KHUSHBU FORD : Hadii edi jeffrey hadsilverioo Soomaali, waaxda luqadaha, qaybta kaalmada adeegyada, corwin stephan hayclaritan cadenkeenan clemente kasie . So [facility] [phone].    ATENCIÓN: Si habla español, tiene a powers disposición servicios gratuitos de asistencia lingüística. Llame al [phone].    We comply with applicable federal civil rights laws and [place] laws. We do not discriminate on the basis of race, color, national origin, age, disability, sex, sexual orientation, or gender identity.            Thank you!     Thank you for choosing [facility]  for your care. Our goal is always to provide you with excellent care. Hearing back from our patients is one way we can continue to improve our services. Please take a few minutes to complete the written survey that you may receive in the mail after your visit with us. Thank you!             Your Updated Medication List - Protect others around you: Learn how to safely use, store and throw away your medicines at www.disposemymeds.org.          This list is accurate as of 3/2/18  6:09 PM.  Always use your most recent med list.                   Brand Name Dispense Instructions for use Diagnosis    amoxicillin 500 MG capsule    AMOXIL    56 capsule    Take 2 capsules (1,000 mg) by mouth 2 times daily for 14 days    H. pylori infection       aspirin 81 MG tablet     90 tablet    Take 81 mg by mouth as needed        atenolol-chlorthalidone 50-25 MG per tablet    TENORETIC    30 tablet    TAKE ONE TABLET BY MOUTH ONE TIME DAILY    Essential hypertension with goal blood pressure less than 140/90       benzonatate 200 MG capsule    TESSALON    20 capsule    Take 1 capsule (200 mg) by mouth 3 times daily as needed for cough    Upper  respiratory tract infection, unspecified type       clarithromycin 500 MG tablet    BIAXIN    28 tablet    Take 1 tablet (500 mg) by mouth 2 times daily for 14 days    H. pylori infection       famotidine 40 MG tablet    PEPCID    30 tablet    Take 1 tablet (40 mg) by mouth daily    Gastroesophageal reflux disease without esophagitis       HYDROcodone-acetaminophen 5-325 MG per tablet    NORCO    42 tablet    Take 1 tablet by mouth every 6 hours as needed for moderate to severe pain    Osteoarthritis of spine with radiculopathy, lumbar region       * omeprazole 20 MG CR capsule    priLOSEC    90 capsule    Take 1 capsule (20 mg) by mouth daily    Gastroesophageal reflux disease without esophagitis       * omeprazole 20 MG CR capsule    priLOSEC    28 capsule    Take 1 capsule (20 mg) by mouth 2 times daily for 14 days    H. pylori infection       potassium chloride SA 20 MEQ CR tablet    KLOR-CON    90 tablet    Take 1 tablet (20 mEq) by mouth daily    Hypokalemia       probiotic Caps     30 capsule    VSL Probiotic- Take 2 caps daily while on antibiotics    H. pylori infection       trimethoprim-polymyxin b ophthalmic solution    POLYTRIM    1 Bottle    Apply 1 drop to eye every 3 hours for 7 days    Acute conjunctivitis of both eyes, unspecified acute conjunctivitis type       * Notice:  This list has 2 medication(s) that are the same as other medications prescribed for you. Read the directions carefully, and ask your doctor or other care provider to review them with you.

## 2018-03-02 NOTE — NURSING NOTE
"Chief Complaint   Patient presents with     Knee Pain       Initial /78  Pulse 67  Temp 98.2  F (36.8  C)  Resp 14  Wt 183 lb (83 kg)  BMI 29.27 kg/m2 Estimated body mass index is 29.27 kg/(m^2) as calculated from the following:    Height as of 2/26/18: 5' 6.3\" (1.684 m).    Weight as of this encounter: 183 lb (83 kg).  Medication Reconciliation: complete     Candelaria Chapman. MA      "

## 2018-03-04 ENCOUNTER — RADIANT APPOINTMENT (OUTPATIENT)
Dept: GENERAL RADIOLOGY | Facility: CLINIC | Age: 53
End: 2018-03-04
Attending: NURSE PRACTITIONER
Payer: COMMERCIAL

## 2018-03-04 ENCOUNTER — OFFICE VISIT (OUTPATIENT)
Dept: URGENT CARE | Facility: URGENT CARE | Age: 53
End: 2018-03-04
Payer: COMMERCIAL

## 2018-03-04 ENCOUNTER — MYC MEDICAL ADVICE (OUTPATIENT)
Dept: FAMILY MEDICINE | Facility: CLINIC | Age: 53
End: 2018-03-04

## 2018-03-04 VITALS
OXYGEN SATURATION: 99 % | HEART RATE: 68 BPM | DIASTOLIC BLOOD PRESSURE: 78 MMHG | SYSTOLIC BLOOD PRESSURE: 112 MMHG | WEIGHT: 183 LBS | BODY MASS INDEX: 29.27 KG/M2 | TEMPERATURE: 98.1 F

## 2018-03-04 DIAGNOSIS — M25.461 EFFUSION OF BURSA OF RIGHT KNEE: Primary | ICD-10-CM

## 2018-03-04 DIAGNOSIS — M25.561 ACUTE PAIN OF RIGHT KNEE: ICD-10-CM

## 2018-03-04 PROCEDURE — 73562 X-RAY EXAM OF KNEE 3: CPT | Mod: RT

## 2018-03-04 PROCEDURE — 99214 OFFICE O/P EST MOD 30 MIN: CPT | Performed by: NURSE PRACTITIONER

## 2018-03-04 RX ORDER — ASPIRIN 81 MG
TABLET,CHEWABLE ORAL
Qty: 60 G | Refills: 0 | Status: SHIPPED | OUTPATIENT
Start: 2018-03-04 | End: 2018-03-13

## 2018-03-04 RX ORDER — NAPROXEN 500 MG/1
500 TABLET ORAL 2 TIMES DAILY PRN
Qty: 30 TABLET | Refills: 0 | Status: SHIPPED | OUTPATIENT
Start: 2018-03-04 | End: 2018-05-31

## 2018-03-04 NOTE — PATIENT INSTRUCTIONS
At [facility], we strive to deliver an exceptional experience to you, every time we see you.  If you receive a survey in the mail, please send us back your thoughts. We really do value your feedback.    Based on your medical history, these are the current health maintenance/preventive care services that you are due for (some may have been done at this visit.)  There are no preventive care reminders to display for this patient.      Suggested websites for health information:  Www.[facility].org : Up to date and easily searchable information on multiple topics.  Www.medlineplus.gov : medication info, interactive tutorials, watch real surgeries online  Www.familydoctor.org : good info from the Academy of Family Physicians  Www.cdc.gov : public health info, travel advisories, epidemics (H1N1)  Www.aap.org : children's health info, normal development, vaccinations  Www.health.[facility].[place].us : [place] dept of health, public health issues in [place], N1N1    Your care team:                            Family Medicine Internal Medicine   MD Alexy Giraldo MD Shantel Branch-Fleming, MD Katya Georgiev PA-C Nam Ho, MD Pediatrics   GOPI Gale, MADY Higgins APRN CNP   MD Martha Garcia MD Deborah Mielke, MD Kim Thein, APRN [facility]      Clinic hours: Monday - Thursday 7 am-7 pm; Fridays 7 am-5 pm.   Urgent care: Monday - Friday 11 am-9 pm; Saturday and Sunday 9 am-5 pm.  Pharmacy : Monday -Thursday 8 am-8 pm; Friday 8 am-6 pm; Saturday and Sunday 9 am-5 pm.     Clinic: [phone]   Pharmacy: [phone]    Water on the Knee    Water on the knee is also known as knee effusion. The knee joint normally has less than 1 ounce of fluid. Injury or inflammation of the knee joint causes extra fluid to collect there. When this happens, the knee joint looks swollen and is usually painful. It may be hard to fully bend the knee.  The most common cause of  water on the knee is osteoarthritis due to wear and tear on the joint cartilage. Other causes include injury to the cartilage, inflammatory arthritis such as gout or rheumatoid arthritis, and infection of the joint.  You may need a needle aspiration, if the cause of your water on the knee is not certain. This procedure removes a sample of joint fluid from the knee for testing. This is done with a local anesthetic. Removing excess fluid may also relieve swelling and pain.  Home care    Limit your activities. Stay off the injured leg as much as possible until pain improves.    Keep your leg elevated to reduce pain and swelling. When sleeping, place a pillow under the injured leg. When sitting, support the injured leg so it is level with your waist. This is very important during the first 48 hours.    Apply an ice pack over the injured area for 15 to 20 minutes every 3 to 6 hours. You should do this for the first 24 to 48 hours. You can make an ice pack by filling a plastic bag that seals at the top with ice cubes and then wrapping it with a thin towel. Continue to use ice packs for relief of pain and swelling as needed. As the ice melts, be careful to avoid getting your wrap, splint, or cast wet. After 48 hours, apply heat(warm shower or warm bath) for 15 to 20 minutes several times a day, or alternate ice and heat. If you have to wear a hook-and-loop knee brace, you can open it to apply the ice pack, or heat, directly to the knee. Never put ice directly on the skin. Always wrap the ice in a towel or other type of cloth.    You may use over-the-counter pain medicine to control pain, unless another pain medicine was prescribed. If you have chronic liver or kidney disease or have ever had a stomach ulcer or GI bleeding, talk with your healthcare provider before using these medicines.    If crutches or a walker have been recommended, do not put weight on the injured leg until you can do so without pain. Check with your  healthcare provider before returning to sports or full work duties.    If you have a hook-and-loop knee brace, you can remove it to bathe and sleep, unless told otherwise.  Follow-up care  Follow up with your healthcare provider as advised.  If you are overweight, talk to your healthcare provider about a weight loss program. The excess weight puts extra strain on your knees.  When to seek medical advice  Call your healthcare provider right away if any of these occur:    Increasing pain, redness, or swelling of the knee    Fever of 100.4 F (38 C) or above lasting for 24 to 48 hours  Date Last Reviewed: 11/23/2015 2000-2017 [employer]. [address] [place] [address], [place] [address]. All rights reserved. This information is not intended as a substitute for professional medical care. Always follow your healthcare professional's instructions.        R.I.C.E.    R.I.C.E. stands for Rest, Ice, Compression, and Elevation. Doing these things helps limit pain and swelling after an injury. R.I.C.E. also helps injuries heal faster. Use R.I.C.E. for sprains, strains, and severe bruises or bumps. Follow the tips on this handout and begin R.I.C.E. as soon as possible after an injury.  ? Rest  Pain is your body s way of telling you to rest an injured area. Whether you have hurt an elbow, hand, foot, or knee, limiting its use will prevent further injury and help you heal.  ? Ice  Applying ice right after an injury helps prevent swelling and reduce pain. Don t place ice directly on your skin.    Wrap a cold pack or bag of ice in a thin cloth. Place it over the injured area.    Ice for 10 minutes every 3 hours. Don t ice for more than 20 minutes at a time.  ? Compression  Putting pressure (compression) on an injury helps prevent swelling and provides support.    Wrap the injured area firmly with an elastic bandage. If your hand or foot tingles, becomes discolored, or feels cold to the touch, the bandage may be too tight.  Rewrap it more loosely.    If your bandage becomes too loose, rewrap it.    Do not wear an elastic bandage overnight.  ? Elevation  Keeping an injury elevated helps reduce swelling, pain, and throbbing. Elevation is most effective when the injury is kept elevated higher than the heart.     Call your healthcare provider if you notice any of the following:    Fingers or toes feel numb, are cold to the touch, or change color    Skin looks shiny or tight    Pain, swelling, or bruising worsens and is not improved with elevation   Date Last Reviewed: 9/3/2015    4266-8109 [employer] [address] [place] [address]. All rights reserved. This information is not intended as a substitute for professional medical care. Always follow your healthcare professional's instructions.

## 2018-03-04 NOTE — PROGRESS NOTES
"SUBJECTIVE: Thanh Loaiza is a 52 year old male who sustained a right knee injury 3 days ago. Mechanism of injury: Unknown, nothing specific other than \"squats\" recently. Patient was initally seen on Friday  with negative imaging. Patient reports progressive difficulty bending and standing on leg upon waking this morning and is concerned about further injury.     OBJECTIVE:  /78 (BP Location: Left arm, Patient Position: Chair, Cuff Size: Adult Regular)  Pulse 68  Temp 98.1  F (36.7  C) (Oral)  Wt 183 lb (83 kg)  SpO2 99%  BMI 29.27 kg/m2  General: alert, oriented in no acute distress  Knee exam: antalgic gait, soft tissue tenderness over lateral and medial patella, lateral area of effusion noted,  reduced range of motion, exam limited by acuity of pain.  X-ray:     IMPRESSION:   1. No fractures are identified.  2. Knee joint effusion.    ASSESSMENT: RIGHT Knee Effusion     PLAN: Discussed overall cares with knee effusion, imaging reviewed. RICE cares discussed,  compression brace applied during day with crutches, take naprosyn with food and no other NSAIDs advised, ice applications and topical ointment prescribed. Patient agreed to the plan of care with no further questions or concerns.       Wes Cruz, RIKI, CNP    "

## 2018-03-04 NOTE — MR AVS SNAPSHOT
After Visit Summary   3/4/2018    Thanh Loaiza    MRN: [id number]           Patient Information     Date Of Birth          1965        Visit Information        Provider Department      3/4/2018 10:50 AM Lenora Cruz APRN CNP [facility]        Today's Diagnoses     Effusion of bursa of right knee    -  1    Acute pain of right knee          Care Instructions    At [facility], we strive to deliver an exceptional experience to you, every time we see you.  If you receive a survey in the mail, please send us back your thoughts. We really do value your feedback.    Based on your medical history, these are the current health maintenance/preventive care services that you are due for (some may have been done at this visit.)  There are no preventive care reminders to display for this patient.      Suggested websites for health information:  Www.Ang[employer]ish : Up to date and easily searchable information on multiple topics.  Www.medlineplus.gov : medication info, interactive tutorials, watch real surgeries online  Www.familydoctor.org : good info from the Academy of Family Physicians  Www.cdc.gov : public health info, travel advisories, epidemics (H1N1)  Www.aap.org : children's health info, normal development, vaccinations  Www.health.[facility].[place].us : [place] dept of health, public health issues in [place], N1N1    Your care team:                            Family Medicine Internal Medicine   MD Alexy Giraldo MD Shantel Branch-Fleming, MD Katya Georgiev PA-C Nam Ho, MD Pediatrics   GOPI Gale, MD Martha Cuellar CNP, MD Deborah Mielke, MD Kim Thein, APRN CNP      Clinic hours: Monday - Thursday 7 am-7 pm; Fridays 7 am-5 pm.   Urgent care: Monday - Friday 11 am-9 pm; Saturday and Sunday 9 am-5 pm.  Pharmacy : Monday -Thursday 8 am-8 pm; Friday 8 am-6 pm; Saturday and Sunday  9 am-5 pm.     Clinic: [phone]   Pharmacy: [phone]    Water on the Knee    Water on the knee is also known as knee effusion. The knee joint normally has less than 1 ounce of fluid. Injury or inflammation of the knee joint causes extra fluid to collect there. When this happens, the knee joint looks swollen and is usually painful. It may be hard to fully bend the knee.  The most common cause of water on the knee is osteoarthritis due to wear and tear on the joint cartilage. Other causes include injury to the cartilage, inflammatory arthritis such as gout or rheumatoid arthritis, and infection of the joint.  You may need a needle aspiration, if the cause of your water on the knee is not certain. This procedure removes a sample of joint fluid from the knee for testing. This is done with a local anesthetic. Removing excess fluid may also relieve swelling and pain.  Home care    Limit your activities. Stay off the injured leg as much as possible until pain improves.    Keep your leg elevated to reduce pain and swelling. When sleeping, place a pillow under the injured leg. When sitting, support the injured leg so it is level with your waist. This is very important during the first 48 hours.    Apply an ice pack over the injured area for 15 to 20 minutes every 3 to 6 hours. You should do this for the first 24 to 48 hours. You can make an ice pack by filling a plastic bag that seals at the top with ice cubes and then wrapping it with a thin towel. Continue to use ice packs for relief of pain and swelling as needed. As the ice melts, be careful to avoid getting your wrap, splint, or cast wet. After 48 hours, apply heat(warm shower or warm bath) for 15 to 20 minutes several times a day, or alternate ice and heat. If you have to wear a hook-and-loop knee brace, you can open it to apply the ice pack, or heat, directly to the knee. Never put ice directly on the skin. Always wrap the ice in a towel or other type of  cloth.    You may use over-the-counter pain medicine to control pain, unless another pain medicine was prescribed. If you have chronic liver or kidney disease or have ever had a stomach ulcer or GI bleeding, talk with your healthcare provider before using these medicines.    If crutches or a walker have been recommended, do not put weight on the injured leg until you can do so without pain. Check with your healthcare provider before returning to sports or full work duties.    If you have a hook-and-loop knee brace, you can remove it to bathe and sleep, unless told otherwise.  Follow-up care  Follow up with your healthcare provider as advised.  If you are overweight, talk to your healthcare provider about a weight loss program. The excess weight puts extra strain on your knees.  When to seek medical advice  Call your healthcare provider right away if any of these occur:    Increasing pain, redness, or swelling of the knee    Fever of 100.4 F (38 C) or above lasting for 24 to 48 hours  Date Last Reviewed: 11/23/2015 2000-2017 [employer]. [address] [place] [address]. All rights reserved. This information is not intended as a substitute for professional medical care. Always follow your healthcare professional's instructions.        R.I.C.E.    R.I.C.E. stands for Rest, Ice, Compression, and Elevation. Doing these things helps limit pain and swelling after an injury. R.I.C.E. also helps injuries heal faster. Use R.I.C.E. for sprains, strains, and severe bruises or bumps. Follow the tips on this handout and begin R.I.C.E. as soon as possible after an injury.  ? Rest  Pain is your body s way of telling you to rest an injured area. Whether you have hurt an elbow, hand, foot, or knee, limiting its use will prevent further injury and help you heal.  ? Ice  Applying ice right after an injury helps prevent swelling and reduce pain. Don t place ice directly on your skin.    Wrap a cold pack or bag  of ice in a thin cloth. Place it over the injured area.    Ice for 10 minutes every 3 hours. Don t ice for more than 20 minutes at a time.  ? Compression  Putting pressure (compression) on an injury helps prevent swelling and provides support.    Wrap the injured area firmly with an elastic bandage. If your hand or foot tingles, becomes discolored, or feels cold to the touch, the bandage may be too tight. Rewrap it more loosely.    If your bandage becomes too loose, rewrap it.    Do not wear an elastic bandage overnight.  ? Elevation  Keeping an injury elevated helps reduce swelling, pain, and throbbing. Elevation is most effective when the injury is kept elevated higher than the heart.     Call your healthcare provider if you notice any of the following:    Fingers or toes feel numb, are cold to the touch, or change color    Skin looks shiny or tight    Pain, swelling, or bruising worsens and is not improved with elevation   Date Last Reviewed: 9/3/2015    3214-3045 [employer]. [address] [place] [address]. All rights reserved. This information is not intended as a substitute for professional medical care. Always follow your healthcare professional's instructions.                Follow-ups after your visit        Follow-up notes from your care team     Return if symptoms worsen or fail to improve.      Who to contact     If you have questions or need follow up information about today's clinic visit or your schedule please contact [facility] directly at [phone].  Normal or non-critical lab and imaging results will be communicated to you by MyChart, letter or phone within 4 business days after the clinic has received the results. If you do not hear from us within 7 days, please contact the clinic through [employer] or phone. If you have a critical or abnormal lab result, we will notify you by phone as soon as possible.  Submit refill requests through [employer] or call  your pharmacy and they will forward the refill request to us. Please allow 3 business days for your refill to be completed.          Additional Information About Your Visit        [employer] Information     [employer] gives you secure access to your electronic health record. If you see a primary care provider, you can also send messages to your care team and make appointments. If you have questions, please call your primary care clinic.  If you do not have a primary care provider, please call [phone] and they will assist you.        Care EveryWhere ID     This is your Care EveryWhere ID. This could be used by other organizations to access your [facility] medical records  [id number]        Your Vitals Were     Pulse Temperature Pulse Oximetry BMI (Body Mass Index)          68 98.1  F (36.7  C) (Oral) 99% 29.27 kg/m2         Blood Pressure from Last 3 Encounters:   03/04/18 112/78   03/02/18 114/78   02/26/18 109/73    Weight from Last 3 Encounters:   03/04/18 183 lb (83 kg)   03/02/18 183 lb (83 kg)   02/26/18 181 lb (82.1 kg)              We Performed the Following     Crutches     ELASTIC KNEE SUPPORT S          Today's Medication Changes          These changes are accurate as of 3/4/18  4:08 PM.  If you have any questions, ask your nurse or doctor.               Start taking these medicines.        Dose/Directions    Capsaicin 0.1 % cream   Commonly known as:  CAPSAICIN HP   Used for:  Effusion of bursa of right knee   Started by:  Lenora Cruz APRN CNP        Apply as needed areas twice daily   Quantity:  60 g   Refills:  0       naproxen 500 MG tablet   Commonly known as:  NAPROSYN   Used for:  Effusion of bursa of right knee   Started by:  Lenora Cruz APRN CNP        Dose:  500 mg   Take 1 tablet (500 mg) by mouth 2 times daily as needed for moderate pain   Quantity:  30 tablet   Refills:  0         These medicines have changed or have updated prescriptions.        Dose/Directions    *  omeprazole 20 MG CR capsule   Commonly known as:  priLOSEC   This may have changed:    - when to take this  - reasons to take this   Used for:  Gastroesophageal reflux disease without esophagitis        Dose:  20 mg   Take 1 capsule (20 mg) by mouth daily   Quantity:  90 capsule   Refills:  3       * omeprazole 20 MG CR capsule   Commonly known as:  priLOSEC   This may have changed:  Another medication with the same name was changed. Make sure you understand how and when to take each.   Used for:  H. pylori infection        Dose:  20 mg   Take 1 capsule (20 mg) by mouth 2 times daily for 14 days   Quantity:  28 capsule   Refills:  0       * Notice:  This list has 2 medication(s) that are the same as other medications prescribed for you. Read the directions carefully, and ask your doctor or other care provider to review them with you.         Where to get your medicines      [place] [facility] [place] [address]  [address], [facility] [address]     Phone:  [phone]     Capsaicin 0.1 % cream    naproxen 500 MG tablet                Primary Care Provider Office Phone # Fax #    Saravanan Bocanegra [phone]       [address]  [facility] [address]        Equal Access to Services     KHUSHBU FORD : Hadii edi quinteroso Soshahriarali, waaxda luqadaha, qaybta kaalmada adeegyada, corwin rolon. So [facility] [phone].    ATENCIÓN: Si habla español, tiene a powers disposición servicios gratuitos de asistencia lingüística. Llame al [phone].    We comply with applicable federal civil rights laws and [place] laws. We do not discriminate on the basis of race, color, national origin, age, disability, sex, sexual orientation, or gender identity.            Thank you!     Thank you for choosing [facility]  for your care. Our goal is always to provide you with excellent care. Hearing back from our  patients is one way we can continue to improve our services. Please take a few minutes to complete the written survey that you may receive in the mail after your visit with us. Thank you!             Your Updated Medication List - Protect others around you: Learn how to safely use, store and throw away your medicines at www.disposemymeds.org.          This list is accurate as of 3/4/18  4:08 PM.  Always use your most recent med list.                   Brand Name Dispense Instructions for use Diagnosis    amoxicillin 500 MG capsule    AMOXIL    56 capsule    Take 2 capsules (1,000 mg) by mouth 2 times daily for 14 days    H. pylori infection       aspirin 81 MG tablet     90 tablet    Take 81 mg by mouth as needed        atenolol-chlorthalidone 50-25 MG per tablet    TENORETIC    30 tablet    TAKE ONE TABLET BY MOUTH ONE TIME DAILY    Essential hypertension with goal blood pressure less than 140/90       Capsaicin 0.1 % cream    CAPSAICIN HP    60 g    Apply as needed areas twice daily    Effusion of bursa of right knee       clarithromycin 500 MG tablet    BIAXIN    28 tablet    Take 1 tablet (500 mg) by mouth 2 times daily for 14 days    H. pylori infection       famotidine 40 MG tablet    PEPCID    30 tablet    Take 1 tablet (40 mg) by mouth daily    Gastroesophageal reflux disease without esophagitis       naproxen 500 MG tablet    NAPROSYN    30 tablet    Take 1 tablet (500 mg) by mouth 2 times daily as needed for moderate pain    Effusion of bursa of right knee       * omeprazole 20 MG CR capsule    priLOSEC    90 capsule    Take 1 capsule (20 mg) by mouth daily    Gastroesophageal reflux disease without esophagitis       * omeprazole 20 MG CR capsule    priLOSEC    28 capsule    Take 1 capsule (20 mg) by mouth 2 times daily for 14 days    H. pylori infection       probiotic Caps     30 capsule    VSL Probiotic- Take 2 caps daily while on antibiotics    H. pylori infection       trimethoprim-polymyxin b  ophthalmic solution    POLYTRIM    1 Bottle    Apply 1 drop to eye every 3 hours for 7 days    Acute conjunctivitis of both eyes, unspecified acute conjunctivitis type       * Notice:  This list has 2 medication(s) that are the same as other medications prescribed for you. Read the directions carefully, and ask your doctor or other care provider to review them with you.

## 2018-03-06 ENCOUNTER — RADIANT APPOINTMENT (OUTPATIENT)
Dept: MRI IMAGING | Facility: CLINIC | Age: 53
End: 2018-03-06
Attending: PHYSICIAN ASSISTANT
Payer: COMMERCIAL

## 2018-03-06 ENCOUNTER — OFFICE VISIT (OUTPATIENT)
Dept: ORTHOPEDICS | Facility: CLINIC | Age: 53
End: 2018-03-06
Payer: COMMERCIAL

## 2018-03-06 VITALS — TEMPERATURE: 97.8 F | WEIGHT: 180 LBS | BODY MASS INDEX: 28.93 KG/M2 | RESPIRATION RATE: 18 BRPM | HEIGHT: 66 IN

## 2018-03-06 DIAGNOSIS — M25.561 RIGHT KNEE PAIN, UNSPECIFIED CHRONICITY: ICD-10-CM

## 2018-03-06 DIAGNOSIS — M25.561 RIGHT KNEE PAIN, UNSPECIFIED CHRONICITY: Primary | ICD-10-CM

## 2018-03-06 DIAGNOSIS — M23.91 INTERNAL DERANGEMENT OF RIGHT KNEE: ICD-10-CM

## 2018-03-06 PROCEDURE — 73721 MRI JNT OF LWR EXTRE W/O DYE: CPT | Mod: RT | Performed by: RADIOLOGY

## 2018-03-06 PROCEDURE — 99244 OFF/OP CNSLTJ NEW/EST MOD 40: CPT | Performed by: ORTHOPAEDIC SURGERY

## 2018-03-06 NOTE — PROGRESS NOTES
Thanh Loaiza is a 52 year old male who is seen in consultation at the request of Krystal Linda for right knee pain.    Past Medical History:   Diagnosis Date     DDD (degenerative disc disease), lumbar      DJD (degenerative joint disease), lumbar      Hypertension goal BP (blood pressure) < 140/90 12/14/2010       Past Surgical History:   Procedure Laterality Date     COLONOSCOPY  10/14/2011    Procedure:COLONOSCOPY; Colonoscopy, change in stools, diarrhea; Surgeon:ALEIDA DELEON; Location:[facility] OR     COLONOSCOPY  10/14/2011    Procedure:COMBINED COLONOSCOPY, SINGLE BIOPSY/POLYPECTOMY BY BIOPSY; Surgeon:ALEIDA DELEON; Location:[facility] OR     ORTHOPEDIC SURGERY       TONSILLECTOMY      age 11 years       Family History   Problem Relation Age of Onset     Hypertension Mother      DIABETES Father      Hypertension Father      Hypertension Brother        Social History     Social History     Marital status:      Spouse name: N/A     Number of children: N/A     Years of education: N/A     Occupational History     Not on file.     Social History Main Topics     Smoking status: Never Smoker     Smokeless tobacco: Never Used     Alcohol use No     Drug use: No     Sexual activity: Yes     Partners: Female     Other Topics Concern     Parent/Sibling W/ Cabg, Mi Or Angioplasty Before 65f 55m? No     Social History Narrative       Current Outpatient Prescriptions   Medication Sig Dispense Refill     naproxen (NAPROSYN) 500 MG tablet Take 1 tablet (500 mg) by mouth 2 times daily as needed for moderate pain 30 tablet 0     Capsaicin (CAPSAICIN HP) 0.1 % cream Apply as needed areas twice daily 60 g 0     clarithromycin (BIAXIN) 500 MG tablet Take 1 tablet (500 mg) by mouth 2 times daily for 14 days 28 tablet 0     omeprazole (PRILOSEC) 20 MG CR capsule Take 1 capsule (20 mg) by mouth 2 times daily for 14 days 28 capsule 0     amoxicillin (AMOXIL) 500 MG capsule Take 2 capsules (1,000 mg) by mouth 2 times daily for 14 days  "56 capsule 0     probiotic CAPS VSL Probiotic- Take 2 caps daily while on antibiotics 30 capsule 0     famotidine (PEPCID) 40 MG tablet Take 1 tablet (40 mg) by mouth daily 30 tablet 2     atenolol-chlorthalidone (TENORETIC) 50-25 MG per tablet TAKE ONE TABLET BY MOUTH ONE TIME DAILY  30 tablet 6     omeprazole (PRILOSEC) 20 MG capsule Take 1 capsule (20 mg) by mouth daily (Patient taking differently: Take 20 mg by mouth as needed ) 90 capsule 3     aspirin 81 MG tablet Take 81 mg by mouth as needed  90 tablet 3       No Known Allergies    REVIEW OF SYSTEMS:  CONSTITUTIONAL:  NEGATIVE for fever, chills, change in weight, not feeling tired  SKIN:  NEGATIVE for worrisome rashes, no skin lumps, no skin ulcers and no non-healing wounds  EYES:  NEGATIVE for vision changes or irritation.  ENT/MOUTH:  NEGATIVE.  No hearing loss, no hoarseness, no difficulty swallowing.  RESP:  NEGATIVE. No cough or shortness of breath.  CV:  NEGATIVE for chest pain, palpitations or peripheral edema  GI:  NEGATIVE for nausea, abdominal pain, heartburn, or change in bowel habits  :  Negative. No dysuria, no hematuria  MUSCULOSKELETAL:  See HPI above  NEURO:  NEGATIVE . No headaches, no dizziness,  no numbness  ENDOCRINE:  NEGATIVE for temperature intolerance, skin/hair changes  HEME/ALLERGY/IMMUNE:  NEGATIVE for bleeding problems  PSYCHIATRIC:  NEGATIVE. no anxiety, no depression.      Exam:  Vitals: Temp 97.8  F (36.6  C)  Resp 18  Ht 1.676 m (5' 6\")  Wt 81.6 kg (180 lb)  BMI 29.05 kg/m2  BMI= Body mass index is 29.05 kg/(m^2).  Constitutional:  healthy, alert and no distress  Neuro: Alert and Oriented x 3, Gait normal. Sensation grossly WNL.  HEENT:  Atraumatic, EOMI  Neck:  Neck supple with no tenderness.  Psych: Affect normal   Respiratory: Breathing not labored.  Cardiovascular: normal peripheral pulses  Lymph: no adenopathy  Skin: No rashes,worrisome lesions or skin problems  Spine: straight, no straight leg raising pain.  Hips " show full range of motion.  There is no tenderness over the sacro-iliac joints, sciatic notch, or greater trochanters.

## 2018-03-06 NOTE — MR AVS SNAPSHOT
After Visit Summary   3/6/2018    Thanh Loaiza    MRN: [id number]           Patient Information     Date Of Birth          1965        Visit Information        Provider Department      3/6/2018 11:00 AM Dale Lester MD [facility]        Today's Diagnoses     Right knee pain, unspecified chronicity    -  1      Care Instructions    - Please call [facility] at [phone] to schedule your MRI.     [facility] is located at:   [address] [place] [address]    -  Once your MRI is scheduled, make an appointment to discuss the results with Dr. Dale Lester.  You can call [phone] to make this appointment, anytime 2-3 days after your MRI scan is performed. Dr. Lester prefers patients to come in for a follow-up appointment to discuss next steps after the MRI.            Follow-ups after your visit        Future tests that were ordered for you today     Open Future Orders        Priority Expected Expires Ordered    MR Knee Right w/o Contrast Routine  3/6/2019 3/6/2018            Who to contact     If you have questions or need follow up information about today's clinic visit or your schedule please contact [facility] directly at [phone].  Normal or non-critical lab and imaging results will be communicated to you by MyChart, letter or phone within 4 business days after the clinic has received the results. If you do not hear from us within 7 days, please contact the clinic through Myfacepagehart or phone. If you have a critical or abnormal lab result, we will notify you by phone as soon as possible.  Submit refill requests through [employer] or call your pharmacy and they will forward the refill request to us. Please allow 3 business days for your refill to be completed.          Additional Information About Your Visit        [employer] Information     [employer] gives you secure access to  "your electronic health record. If you see a primary care provider, you can also send messages to your care team and make appointments. If you have questions, please call your primary care clinic.  If you do not have a primary care provider, please call [phone] and they will assist you.        Care EveryWhere ID     This is your Care EveryWhere ID. This could be used by other organizations to access your [place] medical records  [id number]        Your Vitals Were     Temperature Respirations Height BMI (Body Mass Index)          97.8  F (36.6  C) 18 1.676 m (5' 6\") 29.05 kg/m2         Blood Pressure from Last 3 Encounters:   03/04/18 112/78   03/02/18 114/78   02/26/18 109/73    Weight from Last 3 Encounters:   03/06/18 81.6 kg (180 lb)   03/04/18 83 kg (183 lb)   03/02/18 83 kg (183 lb)                 Today's Medication Changes          These changes are accurate as of 3/6/18 11:18 AM.  If you have any questions, ask your nurse or doctor.               These medicines have changed or have updated prescriptions.        Dose/Directions    * omeprazole 20 MG CR capsule   Commonly known as:  priLOSEC   This may have changed:    - when to take this  - reasons to take this   Used for:  Gastroesophageal reflux disease without esophagitis        Dose:  20 mg   Take 1 capsule (20 mg) by mouth daily   Quantity:  90 capsule   Refills:  3       * omeprazole 20 MG CR capsule   Commonly known as:  priLOSEC   This may have changed:  Another medication with the same name was changed. Make sure you understand how and when to take each.   Used for:  H. pylori infection        Dose:  20 mg   Take 1 capsule (20 mg) by mouth 2 times daily for 14 days   Quantity:  28 capsule   Refills:  0       * Notice:  This list has 2 medication(s) that are the same as other medications prescribed for you. Read the directions carefully, and ask your doctor or other care provider to review them with you.             Primary Care Provider " Office Phone # Fax #    Saravanan Bocanegra -[phone]       [address]  [facility] [address]        Equal Access to Services     KHUSHBU FORD : Hadii aad ku hadsilverioniru Soshahriarali, waaxda luqadaha, qaybta kaalmada adekeenanyada, corwin crenshaw cadenkeenan clemente kasie rolon. So [facility] [phone].    ATENCIÓN: Si habla español, tiene a powers disposición servicios gratuitos de asistencia lingüística. Llame al [phone].    We comply with applicable federal civil rights laws and [place] laws. We do not discriminate on the basis of race, color, national origin, age, disability, sex, sexual orientation, or gender identity.            Thank you!     Thank you for choosing [facility]  for your care. Our goal is always to provide you with excellent care. Hearing back from our patients is one way we can continue to improve our services. Please take a few minutes to complete the written survey that you may receive in the mail after your visit with us. Thank you!             Your Updated Medication List - Protect others around you: Learn how to safely use, store and throw away your medicines at www.disposemymeds.org.          This list is accurate as of 3/6/18 11:18 AM.  Always use your most recent med list.                   Brand Name Dispense Instructions for use Diagnosis    amoxicillin 500 MG capsule    AMOXIL    56 capsule    Take 2 capsules (1,000 mg) by mouth 2 times daily for 14 days    H. pylori infection       aspirin 81 MG tablet     90 tablet    Take 81 mg by mouth as needed        atenolol-chlorthalidone 50-25 MG per tablet    TENORETIC    30 tablet    TAKE ONE TABLET BY MOUTH ONE TIME DAILY    Essential hypertension with goal blood pressure less than 140/90       Capsaicin 0.1 % cream    CAPSAICIN HP    60 g    Apply as needed areas twice daily    Effusion of bursa of right knee       clarithromycin 500 MG tablet    BIAXIN    28 tablet    Take 1 tablet (500 mg) by mouth 2 times daily for  14 days    H. pylori infection       famotidine 40 MG tablet    PEPCID    30 tablet    Take 1 tablet (40 mg) by mouth daily    Gastroesophageal reflux disease without esophagitis       naproxen 500 MG tablet    NAPROSYN    30 tablet    Take 1 tablet (500 mg) by mouth 2 times daily as needed for moderate pain    Effusion of bursa of right knee       * omeprazole 20 MG CR capsule    priLOSEC    90 capsule    Take 1 capsule (20 mg) by mouth daily    Gastroesophageal reflux disease without esophagitis       * omeprazole 20 MG CR capsule    priLOSEC    28 capsule    Take 1 capsule (20 mg) by mouth 2 times daily for 14 days    H. pylori infection       probiotic Caps     30 capsule    VSL Probiotic- Take 2 caps daily while on antibiotics    H. pylori infection       * Notice:  This list has 2 medication(s) that are the same as other medications prescribed for you. Read the directions carefully, and ask your doctor or other care provider to review them with you.

## 2018-03-06 NOTE — PROGRESS NOTES
Visit Date:   2018      HISTORY OF PRESENT ILLNESS:  Mr. Loaiza is a 52-year-old male seen in consultation at the request of Krystal Linda for evaluation of right knee pain.  He has had pain since early March not really sure what caused it.  He did do a squats several weeks before, had sore muscles for about 2 weeks and then went away, but typically it turned into right knee pain.  He has had similar problems about a year ago that lasted for a brief time.  He now has aching episodic pain rated 6-10/10, worse with walking.  It is much worse than last year.  Better if he sits and sleeps with pressure off the leg with it hanging he has no pain but even with the weight of the foot on the ground he has some pain in the knee.  Pain is diffuse, but primarily medially.      PHYSICAL EXAMINATION:  Shows a moderate effusion, right knee, negative on the left.  He has full extension of both knees.  He flexes to 110 degrees on the right and 130 on the left.  He has tenderness at the medial joint line, anterior mid and posterior on the right knee, negative on the left.  No lateral tenderness.  There is no laxity of MCL, LCL or cruciates, although he does have some diffuse pains with varus and valgus stress to the right knee.  He had significant diffuse pain with medial Geovany on the right and also some pain with lateral Geovany, this was mainly medially also.  Negative Geovany on the left.  He has no increased warmth or erythema.  Sensation and circulation are intact.      IMPRESSION:  His exam is very suspicious for possible medial meniscus tear.  I discussed options with him and have recommended MRI scan of the knee.  X-ray has shown just slight osteoarthritis medially and not enough to cause his pain.  We will see back following the MRI.         ATUL MACK MD             D: 2018   T: 2018   MT: DEEPTI      Name:     AKIRA LOAIZA   MRN:      [id number]        Account:      [id number]   :       1965           Visit Date:   03/06/2018      Document: [id number]

## 2018-03-06 NOTE — LETTER
3/6/2018         RE: Thanh Loaiza  [address]  [place] [address]        Dear Colleague,    Thank you for referring your patient, Thanh Loaiza, to the [facility]. Please see a copy of my visit note below.    Thanh Loaiza is a 52 year old male who is seen in consultation at the request of Krystal Linda for right knee pain.    Past Medical History:   Diagnosis Date     DDD (degenerative disc disease), lumbar      DJD (degenerative joint disease), lumbar      Hypertension goal BP (blood pressure) < 140/90 12/14/2010       Past Surgical History:   Procedure Laterality Date     COLONOSCOPY  10/14/2011    Procedure:COLONOSCOPY; Colonoscopy, change in stools, diarrhea; Surgeon:ALEIDA DELEON; Location:[facility] OR     COLONOSCOPY  10/14/2011    Procedure:COMBINED COLONOSCOPY, SINGLE BIOPSY/POLYPECTOMY BY BIOPSY; Surgeon:ALEIDA DELEON; Location:[facility] OR     ORTHOPEDIC SURGERY       TONSILLECTOMY      age 11 years       Family History   Problem Relation Age of Onset     Hypertension Mother      DIABETES Father      Hypertension Father      Hypertension Brother        Social History     Social History     Marital status:      Spouse name: N/A     Number of children: N/A     Years of education: N/A     Occupational History     Not on file.     Social History Main Topics     Smoking status: Never Smoker     Smokeless tobacco: Never Used     Alcohol use No     Drug use: No     Sexual activity: Yes     Partners: Female     Other Topics Concern     Parent/Sibling W/ Cabg, Mi Or Angioplasty Before 65f 55m? No     Social History Narrative       Current Outpatient Prescriptions   Medication Sig Dispense Refill     naproxen (NAPROSYN) 500 MG tablet Take 1 tablet (500 mg) by mouth 2 times daily as needed for moderate pain 30 tablet 0     Capsaicin (CAPSAICIN HP) 0.1 % cream Apply as needed areas twice daily 60 g 0     clarithromycin (BIAXIN) 500 MG tablet Take 1 tablet (500 mg) by mouth 2  "times daily for 14 days 28 tablet 0     omeprazole (PRILOSEC) 20 MG CR capsule Take 1 capsule (20 mg) by mouth 2 times daily for 14 days 28 capsule 0     amoxicillin (AMOXIL) 500 MG capsule Take 2 capsules (1,000 mg) by mouth 2 times daily for 14 days 56 capsule 0     probiotic CAPS VSL Probiotic- Take 2 caps daily while on antibiotics 30 capsule 0     famotidine (PEPCID) 40 MG tablet Take 1 tablet (40 mg) by mouth daily 30 tablet 2     atenolol-chlorthalidone (TENORETIC) 50-25 MG per tablet TAKE ONE TABLET BY MOUTH ONE TIME DAILY  30 tablet 6     omeprazole (PRILOSEC) 20 MG capsule Take 1 capsule (20 mg) by mouth daily (Patient taking differently: Take 20 mg by mouth as needed ) 90 capsule 3     aspirin 81 MG tablet Take 81 mg by mouth as needed  90 tablet 3       No Known Allergies    REVIEW OF SYSTEMS:  CONSTITUTIONAL:  NEGATIVE for fever, chills, change in weight, not feeling tired  SKIN:  NEGATIVE for worrisome rashes, no skin lumps, no skin ulcers and no non-healing wounds  EYES:  NEGATIVE for vision changes or irritation.  ENT/MOUTH:  NEGATIVE.  No hearing loss, no hoarseness, no difficulty swallowing.  RESP:  NEGATIVE. No cough or shortness of breath.  CV:  NEGATIVE for chest pain, palpitations or peripheral edema  GI:  NEGATIVE for nausea, abdominal pain, heartburn, or change in bowel habits  :  Negative. No dysuria, no hematuria  MUSCULOSKELETAL:  See HPI above  NEURO:  NEGATIVE . No headaches, no dizziness,  no numbness  ENDOCRINE:  NEGATIVE for temperature intolerance, skin/hair changes  HEME/ALLERGY/IMMUNE:  NEGATIVE for bleeding problems  PSYCHIATRIC:  NEGATIVE. no anxiety, no depression.      Exam:  Vitals: Temp 97.8  F (36.6  C)  Resp 18  Ht 1.676 m (5' 6\")  Wt 81.6 kg (180 lb)  BMI 29.05 kg/m2  BMI= Body mass index is 29.05 kg/(m^2).  Constitutional:  healthy, alert and no distress  Neuro: Alert and Oriented x 3, Gait normal. Sensation grossly WNL.  HEENT:  Atraumatic, EOMI  Neck:  Neck supple " with no tenderness.  Psych: Affect normal   Respiratory: Breathing not labored.  Cardiovascular: normal peripheral pulses  Lymph: no adenopathy  Skin: No rashes,worrisome lesions or skin problems  Spine: straight, no straight leg raising pain.  Hips show full range of motion.  There is no tenderness over the sacro-iliac joints, sciatic notch, or greater trochanters.       Again, thank you for allowing me to participate in the care of your patient.        Sincerely,        Dale Lester MD

## 2018-03-06 NOTE — PATIENT INSTRUCTIONS
- Please call [facility] at [phone] to schedule your MRI.     [facility] is located at:   [address] [place] [address]    -  Once your MRI is scheduled, make an appointment to discuss the results with Dr. Dale Lester.  You can call [phone] to make this appointment, anytime 2-3 days after your MRI scan is performed. Dr. Lester prefers patients to come in for a follow-up appointment to discuss next steps after the MRI.

## 2018-03-07 ENCOUNTER — TELEPHONE (OUTPATIENT)
Dept: ORTHOPEDICS | Facility: CLINIC | Age: 53
End: 2018-03-07

## 2018-03-07 NOTE — TELEPHONE ENCOUNTER
Reason for Call:  Other call back    Detailed comments: Patient calling regarding MRI results. States he just missed a call    Phone Number Patient can be reached at: Home number on file [phone] (home)    Best Time: Any    Can we leave a detailed message on this number? YES    Call taken on 3/7/2018 at 12:27 PM by Kat Zacarias

## 2018-03-08 NOTE — TELEPHONE ENCOUNTER
Called patient and let him know Dr. Lester does not discuss results over the phone, he likes to see his patient to discuss what the next step will be.    Mariza Rinaldi MA

## 2018-03-13 ENCOUNTER — TELEPHONE (OUTPATIENT)
Dept: ORTHOPEDICS | Facility: CLINIC | Age: 53
End: 2018-03-13

## 2018-03-13 ENCOUNTER — ANESTHESIA EVENT (OUTPATIENT)
Dept: SURGERY | Facility: AMBULATORY SURGERY CENTER | Age: 53
End: 2018-03-13

## 2018-03-13 ENCOUNTER — OFFICE VISIT (OUTPATIENT)
Dept: ORTHOPEDICS | Facility: CLINIC | Age: 53
End: 2018-03-13
Payer: COMMERCIAL

## 2018-03-13 VITALS — RESPIRATION RATE: 13 BRPM | BODY MASS INDEX: 29.25 KG/M2 | HEIGHT: 66 IN | WEIGHT: 182 LBS

## 2018-03-13 DIAGNOSIS — M94.261 CHONDROMALACIA OF RIGHT KNEE: ICD-10-CM

## 2018-03-13 DIAGNOSIS — S83.231D COMPLEX TEAR OF MEDIAL MENISCUS OF RIGHT KNEE AS CURRENT INJURY, SUBSEQUENT ENCOUNTER: Primary | ICD-10-CM

## 2018-03-13 PROCEDURE — 99213 OFFICE O/P EST LOW 20 MIN: CPT | Performed by: ORTHOPAEDIC SURGERY

## 2018-03-13 NOTE — MR AVS SNAPSHOT
After Visit Summary   3/13/2018    Thanh Loaiza    MRN: [id number]           Patient Information     Date Of Birth          1965        Visit Information        Provider Department      3/13/2018 10:00 AM Dale Lester MD [facility]        Today's Diagnoses     Complex tear of medial meniscus of right knee as current injury, subsequent encounter    -  1      Care Instructions    Preop physical with primary physician is needed within 30 days of the surgery.  Nothing to eat or drink for 8 hours before surgery.  For same day surgery you need a ride.  Someone should stay with you for 12 hours afterward.  Stop blood thinners 5 days before surgery (aspirin, warfarin, anti-inflammatories).      After arthroscopy I will give you these instructions:  You may walk on the leg as soon as you wish.  Bend the knee as soon as you wish.   Most people use crutches for a couple days.  Leave initial dressing for 2 days.  Then you may remove dressing and shower.  Rewrap with ace wrap.  Leave steri-strips in place.  Weight bearing as tolerated.  Apply ice or cold packs intermittently as needed to relieve pain.   Take pain medications as much as needed, but as little as you can get away with.  They do cause constipation.  May use Aleve up to 4 tablets per day or ibuprofen up to 12 tablets per day if desired..  Most people take about a week off work.  I recommend taking one aspirin 325 mg / day for 3 weeks to minimize chance of a blood clot.  Return to clinic 1 week after surgery.    Call [phone] to schedule your surgery.            Follow-ups after your visit        Who to contact     If you have questions or need follow up information about today's clinic visit or your schedule please contact [facility] directly at [phone].  Normal or non-critical lab and imaging results will be communicated to you by MyChart, letter or phone within 4 business days after the  "clinic has received the results. If you do not hear from us within 7 days, please contact the clinic through [employer] or phone. If you have a critical or abnormal lab result, we will notify you by phone as soon as possible.  Submit refill requests through [employer] or call your pharmacy and they will forward the refill request to us. Please allow 3 business days for your refill to be completed.          Additional Information About Your Visit        [employer] Information     [employer] gives you secure access to your electronic health record. If you see a primary care provider, you can also send messages to your care team and make appointments. If you have questions, please call your primary care clinic.  If you do not have a primary care provider, please call [phone] and they will assist you.        Care EveryWhere ID     This is your Care EveryWhere ID. This could be used by other organizations to access your [place] medical records  [id number]        Your Vitals Were     Respirations Height BMI (Body Mass Index)             13 1.676 m (5' 6\") 29.38 kg/m2          Blood Pressure from Last 3 Encounters:   03/04/18 112/78   03/02/18 114/78   02/26/18 109/73    Weight from Last 3 Encounters:   03/13/18 82.6 kg (182 lb)   03/06/18 81.6 kg (180 lb)   03/04/18 83 kg (183 lb)              Today, you had the following     No orders found for display         Today's Medication Changes          These changes are accurate as of 3/13/18 10:29 AM.  If you have any questions, ask your nurse or doctor.               These medicines have changed or have updated prescriptions.        Dose/Directions    * omeprazole 20 MG CR capsule   Commonly known as:  priLOSEC   This may have changed:    - when to take this  - reasons to take this   Used for:  Gastroesophageal reflux disease without esophagitis        Dose:  20 mg   Take 1 capsule (20 mg) by mouth daily   Quantity:  90 capsule   Refills:  3       * omeprazole 20 MG CR capsule "   Commonly known as:  priLOSEC   This may have changed:  Another medication with the same name was changed. Make sure you understand how and when to take each.   Used for:  H. pylori infection        Dose:  20 mg   Take 1 capsule (20 mg) by mouth 2 times daily for 14 days   Quantity:  28 capsule   Refills:  0       * Notice:  This list has 2 medication(s) that are the same as other medications prescribed for you. Read the directions carefully, and ask your doctor or other care provider to review them with you.             Primary Care Provider Office Phone # Fax #    Saravanan Bocanegra [phone]       [address]  [facility] [address]        Equal Access to Services     [facility] HospitalYASMINE : Hadii edi jeffrey hadsilverioo Sokatty, waaxda luqadaha, qaybta kaalmada adeegyada, corwin ferro . So [facility] [place] [facility] [phone].    ATENCIÓN: Si habla español, tiene a powers disposición servicios gratuitos de asistencia lingüística. Llame al [phone].    We comply with applicable federal civil rights laws and [place] laws. We do not discriminate on the basis of race, color, national origin, age, disability, sex, sexual orientation, or gender identity.            Thank you!     Thank you for choosing [facility]  for your care. Our goal is always to provide you with excellent care. Hearing back from our patients is one way we can continue to improve our services. Please take a few minutes to complete the written survey that you may receive in the mail after your visit with us. Thank you!             Your Updated Medication List - Protect others around you: Learn how to safely use, store and throw away your medicines at www.disposemymeds.org.          This list is accurate as of 3/13/18 10:29 AM.  Always use your most recent med list.                   Brand Name Dispense Instructions for use Diagnosis    amoxicillin 500 MG capsule    AMOXIL    56 capsule    Take 2 capsules (1,000 mg) by  mouth 2 times daily for 14 days    H. pylori infection       aspirin 81 MG tablet     90 tablet    Take 81 mg by mouth as needed        atenolol-chlorthalidone 50-25 MG per tablet    TENORETIC    30 tablet    TAKE ONE TABLET BY MOUTH ONE TIME DAILY    Essential hypertension with goal blood pressure less than 140/90       Capsaicin 0.1 % cream    CAPSAICIN HP    60 g    Apply as needed areas twice daily    Effusion of bursa of right knee       clarithromycin 500 MG tablet    BIAXIN    28 tablet    Take 1 tablet (500 mg) by mouth 2 times daily for 14 days    H. pylori infection       famotidine 40 MG tablet    PEPCID    30 tablet    Take 1 tablet (40 mg) by mouth daily    Gastroesophageal reflux disease without esophagitis       naproxen 500 MG tablet    NAPROSYN    30 tablet    Take 1 tablet (500 mg) by mouth 2 times daily as needed for moderate pain    Effusion of bursa of right knee       * omeprazole 20 MG CR capsule    priLOSEC    90 capsule    Take 1 capsule (20 mg) by mouth daily    Gastroesophageal reflux disease without esophagitis       * omeprazole 20 MG CR capsule    priLOSEC    28 capsule    Take 1 capsule (20 mg) by mouth 2 times daily for 14 days    H. pylori infection       probiotic Caps     30 capsule    VSL Probiotic- Take 2 caps daily while on antibiotics    H. pylori infection       * Notice:  This list has 2 medication(s) that are the same as other medications prescribed for you. Read the directions carefully, and ask your doctor or other care provider to review them with you.

## 2018-03-13 NOTE — PATIENT INSTRUCTIONS
Preop physical with primary physician is needed within 30 days of the surgery.  Nothing to eat or drink for 8 hours before surgery.  For same day surgery you need a ride.  Someone should stay with you for 12 hours afterward.  Stop blood thinners 5 days before surgery (aspirin, warfarin, anti-inflammatories).      After arthroscopy I will give you these instructions:  You may walk on the leg as soon as you wish.  Bend the knee as soon as you wish.   Most people use crutches for a couple days.  Leave initial dressing for 2 days.  Then you may remove dressing and shower.  Rewrap with ace wrap.  Leave steri-strips in place.  Weight bearing as tolerated.  Apply ice or cold packs intermittently as needed to relieve pain.   Take pain medications as much as needed, but as little as you can get away with.  They do cause constipation.  May use Aleve up to 4 tablets per day or ibuprofen up to 12 tablets per day if desired..  Most people take about a week off work.  I recommend taking one aspirin 325 mg / day for 3 weeks to minimize chance of a blood clot.  Return to clinic 1 week after surgery.    Call [phone] to schedule your surgery.

## 2018-03-13 NOTE — PROGRESS NOTES
Thanh Loaiza returns for his right knee MRI results.  MRI images were independently visualized with the patient.  These showed a medial meniscus tear, the medial meniscus tear consists of horizontal cleavage tear with further tearing of inferior leaflet, moderate chondromalacia in the medial compartment and moderate chondromalacia in the patellofemoral compartment.    Impression:  Right Knee: medial meniscus tear  Osteoarthritis mild    Thus, our plan is Knee Arthroscopy: Discussed findings with patient.  We talked about treatment options.  I feel the best treatment would be knee arthroscopy, partial medial meniscectomy . I have explained the nature of the procedure, the risks and recovery time with the patient.     Total time spent was 15 minutes; with 15 minutes spent face-to-face with patient discussing test results, treatment options, and estimated recovery time.

## 2018-03-13 NOTE — NURSING NOTE
"Chief Complaint   Patient presents with     RECHECK     Follow-up for right knee MRI       Initial Resp 13  Ht 1.676 m (5' 6\")  Wt 82.6 kg (182 lb)  BMI 29.38 kg/m2 Estimated body mass index is 29.38 kg/(m^2) as calculated from the following:    Height as of this encounter: 1.676 m (5' 6\").    Weight as of this encounter: 82.6 kg (182 lb).  Medication Reconciliation: complete     Eliecer Wallace PA-C  Supervising physician: Dale Lester MD  Dept. of Orthopedics  [facility]          "

## 2018-03-13 NOTE — LETTER
3/13/2018         RE: Thanh Loaiza  [address]  [place] [address]        Dear Colleague,    Thank you for referring your patient, Thanh Loaiza, to the [facility]. Please see a copy of my visit note below.    Thanh Loaiza returns for his right knee MRI results.  MRI images were independently visualized with the patient.  These showed a medial meniscus tear, the medial meniscus tear consists of horizontal cleavage tear with further tearing of inferior leaflet, moderate chondromalacia in the medial compartment and moderate chondromalacia in the patellofemoral compartment.    Impression:  Right Knee: medial meniscus tear  Osteoarthritis mild    Thus, our plan is Knee Arthroscopy: Discussed findings with patient.  We talked about treatment options.  I feel the best treatment would be knee arthroscopy, partial medial meniscectomy . I have explained the nature of the procedure, the risks and recovery time with the patient.     Total time spent was 15 minutes; with 15 minutes spent face-to-face with patient discussing test results, treatment options, and estimated recovery time.          Again, thank you for allowing me to participate in the care of your patient.        Sincerely,        Dale Lester MD

## 2018-03-14 ENCOUNTER — OFFICE VISIT (OUTPATIENT)
Dept: FAMILY MEDICINE | Facility: CLINIC | Age: 53
End: 2018-03-14
Payer: COMMERCIAL

## 2018-03-14 VITALS
HEART RATE: 67 BPM | TEMPERATURE: 97.7 F | BODY MASS INDEX: 28.73 KG/M2 | DIASTOLIC BLOOD PRESSURE: 71 MMHG | SYSTOLIC BLOOD PRESSURE: 109 MMHG | HEIGHT: 66 IN | OXYGEN SATURATION: 97 % | WEIGHT: 178.8 LBS

## 2018-03-14 DIAGNOSIS — S83.231D COMPLEX TEAR OF MEDIAL MENISCUS OF RIGHT KNEE AS CURRENT INJURY, SUBSEQUENT ENCOUNTER: ICD-10-CM

## 2018-03-14 DIAGNOSIS — Z01.818 PREOP GENERAL PHYSICAL EXAM: Primary | ICD-10-CM

## 2018-03-14 PROCEDURE — 99214 OFFICE O/P EST MOD 30 MIN: CPT | Performed by: PHYSICIAN ASSISTANT

## 2018-03-14 NOTE — MR AVS SNAPSHOT
After Visit Summary   3/14/2018    Thanh Loaiza    MRN: [id number]           Patient Information     Date Of Birth          1965        Visit Information        Provider Department      3/14/2018 9:20 AM Ernesto Holland PA [facility]        Today's Diagnoses     Preop general physical exam    -  1      Care Instructions    At [facility], we strive to deliver an exceptional experience to you, every time we see you.  If you receive a survey in the mail, please send us back your thoughts. We really do value your feedback.    Based on your medical history, these are the current health maintenance/preventive care services that you are due for (some may have been done at this visit.)  There are no preventive care reminders to display for this patient.      Suggested websites for health information:  Www.[employer].Nursenav : Up to date and easily searchable information on multiple topics.  Www.Comuto.gov : medication info, interactive tutorials, watch real surgeries online  Www.familydoctor.org : good info from the Academy of Family Physicians  Www.cdc.gov : public health info, travel advisories, epidemics (H1N1)  Www.aap.org : children's health info, normal development, vaccinations  Www.health.state[place]us : [place] dept of health, public health issues in [place], N1N1    Your care team:                            Family Medicine Internal Medicine   MD Alexy Giraldo MD Shantel Branch-Fleming, MD Katya Georgiev PA-C Nam Ho, MD Pediatrics   GOPI Gale, MD Martha Cuellar CNP, MD Deborah Mielke, MD Kim Thein, APRELYSE EARL      Clinic hours: Monday - Thursday 7 am-7 pm; Fridays 7 am-5 pm.   Urgent care: Monday - Friday 11 am-9 pm; Saturday and Sunday 9 am-5 pm.  Pharmacy : Monday -Thursday 8 am-8 pm; Friday 8 am-6 pm; Saturday and Sunday 9 am-5 pm.     Clinic: [phone]    Pharmacy: [phone]    Before Your Surgery      Call your surgeon if there is any change in your health. This includes signs of a cold or flu (such as a sore throat, runny nose, cough, rash or fever).    Do not smoke, drink alcohol or take over the counter medicine (unless your surgeon or primary care doctor tells you to) for the 24 hours before and after surgery.    If you take prescribed drugs: Follow your doctor s orders about which medicines to take and which to stop until after surgery.    Eating and drinking prior to surgery: follow the instructions from your surgeon    Take a shower or bath the night before surgery. Use the soap your surgeon gave you to gently clean your skin. If you do not have soap from your surgeon, use your regular soap. Do not shave or scrub the surgery site.  Wear clean pajamas and have clean sheets on your bed.           Follow-ups after your visit        Follow-up notes from your care team     Return if symptoms worsen or fail to improve.      Your next 10 appointments already scheduled     Mar 16, 2018   Procedure with Dale Lester MD   [facility] (--)    [address] [facility] [address] [phone]            Mar 22, 2018  1:45 PM CDT   Return Visit with Dale Lester MD   [facility])    [address]  [place] [id number] [phone]              Who to contact     If you have questions or need follow up information about today's clinic visit or your schedule please contact [facility] [place] directly at [phone].  Normal or non-critical lab and imaging results will be communicated to you by MyChart, letter or phone within 4 business days after the clinic has received the results. If you do not hear from us within 7 days, please contact the clinic through MyChart or phone. If you have a critical or abnormal lab result, we will notify you by phone as soon as  "possible.  Submit refill requests through [employer] or call your pharmacy and they will forward the refill request to us. Please allow 3 business days for your refill to be completed.          Additional Information About Your Visit        [employer] Information     [employer] gives you secure access to your electronic health record. If you see a primary care provider, you can also send messages to your care team and make appointments. If you have questions, please call your primary care clinic.  If you do not have a primary care provider, please call [phone] and they will assist you.        Care EveryWhere ID     This is your Care EveryWhere ID. This could be used by other organizations to access your [facility] medical records  [id number]        Your Vitals Were     Pulse Temperature Height Pulse Oximetry BMI (Body Mass Index)       67 97.7  F (36.5  C) (Oral) 5' 6\" (1.676 m) 97% 28.86 kg/m2        Blood Pressure from Last 3 Encounters:   03/14/18 109/71   03/04/18 112/78   03/02/18 114/78    Weight from Last 3 Encounters:   03/14/18 178 lb 12.8 oz (81.1 kg)   03/13/18 182 lb (82.6 kg)   03/06/18 180 lb (81.6 kg)              Today, you had the following     No orders found for display         Today's Medication Changes          These changes are accurate as of 3/14/18  9:41 AM.  If you have any questions, ask your nurse or doctor.               These medicines have changed or have updated prescriptions.        Dose/Directions    * omeprazole 20 MG CR capsule   Commonly known as:  priLOSEC   This may have changed:    - when to take this  - reasons to take this   Used for:  Gastroesophageal reflux disease without esophagitis        Dose:  20 mg   Take 1 capsule (20 mg) by mouth daily   Quantity:  90 capsule   Refills:  3       * omeprazole 20 MG CR capsule   Commonly known as:  priLOSEC   This may have changed:  Another medication with the same name was changed. Make sure you understand how and when to take each. "   Used for:  H. pylori infection        Dose:  20 mg   Take 1 capsule (20 mg) by mouth 2 times daily for 14 days   Quantity:  28 capsule   Refills:  0       * Notice:  This list has 2 medication(s) that are the same as other medications prescribed for you. Read the directions carefully, and ask your doctor or other care provider to review them with you.             Primary Care Provider Office Phone # Fax #    Saravanan Bocanegra [phone]       [address]  [facility] [place] [address]        Equal Access to Services     [facility]: Hadii aad ku hadasho Soomaali, waaxda luqadaha, qaybta kaalmada adeegyada, waxay idiin hayaan adeeg bryn ferro . So [facility] [phone].    ATENCIÓN: Si habla español, tiene a powers disposición servicios gratuitos de asistencia lingüística. Llame al [phone].    We comply with applicable federal civil rights laws and [place] laws. We do not discriminate on the basis of race, color, national origin, age, disability, sex, sexual orientation, or gender identity.            Thank you!     Thank you for choosing [facility]  for your care. Our goal is always to provide you with excellent care. Hearing back from our patients is one way we can continue to improve our services. Please take a few minutes to complete the written survey that you may receive in the mail after your visit with us. Thank you!             Your Updated Medication List - Protect others around you: Learn how to safely use, store and throw away your medicines at www.disposemymeds.org.          This list is accurate as of 3/14/18  9:41 AM.  Always use your most recent med list.                   Brand Name Dispense Instructions for use Diagnosis    amoxicillin 500 MG capsule    AMOXIL    56 capsule    Take 2 capsules (1,000 mg) by mouth 2 times daily for 14 days    H. pylori infection       aspirin 81 MG tablet     90 tablet    Take 81 mg by mouth as needed        atenolol-chlorthalidone  50-25 MG per tablet    TENORETIC    30 tablet    TAKE ONE TABLET BY MOUTH ONE TIME DAILY    Essential hypertension with goal blood pressure less than 140/90       clarithromycin 500 MG tablet    BIAXIN    28 tablet    Take 1 tablet (500 mg) by mouth 2 times daily for 14 days    H. pylori infection       naproxen 500 MG tablet    NAPROSYN    30 tablet    Take 1 tablet (500 mg) by mouth 2 times daily as needed for moderate pain    Effusion of bursa of right knee       * omeprazole 20 MG CR capsule    priLOSEC    90 capsule    Take 1 capsule (20 mg) by mouth daily    Gastroesophageal reflux disease without esophagitis       * omeprazole 20 MG CR capsule    priLOSEC    28 capsule    Take 1 capsule (20 mg) by mouth 2 times daily for 14 days    H. pylori infection       probiotic Caps     30 capsule    VSL Probiotic- Take 2 caps daily while on antibiotics    H. pylori infection       * Notice:  This list has 2 medication(s) that are the same as other medications prescribed for you. Read the directions carefully, and ask your doctor or other care provider to review them with you.

## 2018-03-14 NOTE — PATIENT INSTRUCTIONS
At [facility], we strive to deliver an exceptional experience to you, every time we see you.  If you receive a survey in the mail, please send us back your thoughts. We really do value your feedback.    Based on your medical history, these are the current health maintenance/preventive care services that you are due for (some may have been done at this visit.)  There are no preventive care reminders to display for this patient.      Suggested websites for health information:  Www.[facility] Center[facility]age.org : Up to date and easily searchable information on multiple topics.  Www.medlineplus.gov : medication info, interactive tutorials, watch real surgeries online  Www.familydoctor.org : good info from the Academy of Family Physicians  Www.cdc.gov : public health info, travel advisories, epidemics (H1N1)  Www.aap.org : children's health info, normal development, vaccinations  Www.health.[facility].[place].us : [place] dept of health, public health issues in [place], N1N1    Your care team:                            Family Medicine Internal Medicine   MD Alexy Giraldo MD Shantel Branch-Fleming, MD Katya Georgiev PA-C Nam Ho, MD Pediatrics   GOPI Gale, MADY LYN CNP   MD Martha Garcia MD Deborah Mielke, MD Kim Thein, APRELYSE [facility]      Clinic hours: Monday - Thursday 7 am-7 pm; Fridays 7 am-5 pm.   Urgent care: Monday - Friday 11 am-9 pm; Saturday and Sunday 9 am-5 pm.  Pharmacy : Monday -Thursday 8 am-8 pm; Friday 8 am-6 pm; Saturday and Sunday 9 am-5 pm.     Clinic: [phone]   Pharmacy: [phone]    Before Your Surgery      Call your surgeon if there is any change in your health. This includes signs of a cold or flu (such as a sore throat, runny nose, cough, rash or fever).    Do not smoke, drink alcohol or take over the counter medicine (unless your surgeon or primary care doctor tells you to) for the 24 hours before and after  surgery.    If you take prescribed drugs: Follow your doctor s orders about which medicines to take and which to stop until after surgery.    Eating and drinking prior to surgery: follow the instructions from your surgeon    Take a shower or bath the night before surgery. Use the soap your surgeon gave you to gently clean your skin. If you do not have soap from your surgeon, use your regular soap. Do not shave or scrub the surgery site.  Wear clean pajamas and have clean sheets on your bed.

## 2018-03-14 NOTE — Clinical Note
Xavier, be sure to but diagnosis for surgery in visit diagnosis area for preops, I added for you.  You should also consider more in the HPI as to why they are having the procedure, ie failed conservative management, unable to tolerate non sedated procedure, etc.  Mamta Weldon M.D.

## 2018-03-14 NOTE — PROGRESS NOTES
[address] [address]  [phone]  Dept: [phone]    PRE-OP EVALUATION:  Today's date: 3/14/2018    Thanh Loaiza (: 1965) presents for pre-operative evaluation assessment as requested by Dr. Lester.  He requires evaluation and anesthesia risk assessment prior to undergoing surgery/procedure for treatment of rt meniscal knee injury.    Proposed Surgery/ Procedure: Anthroscopy knee   Date of Surgery/ Procedure: 3/16/18  Time of Surgery/ Procedure: 10:15am  [facility]  Fax number for surgical facility:    Primary Physician: Saravanan Bocanegra  Type of Anesthesia Anticipated: to be determined    Patient has a Health Care Directive or Living Will:  NO    1. NO - DO YOU HAVE A HISTORY OF HEART ATTACK, STROKE, STENT, BYPASS OR SURGERY ON AN ARTERY IN THE HEAD, NECK, HEART OR LEG?    1. NO - Do you have a history of heart attack, stroke, stent, bypass or surgery on an artery in the head, neck, heart or legs?  2. NO - Do you ever have any pain or discomfort in your chest?  3. NO - Do you have a history of  Heart Failure?  4. NO - Are you troubled by shortness of breath when: walking on the level, up a slight hill or at night?  5. NO - Do you currently have a cold, bronchitis or other respiratory infection?  6. NO - Do you have a cough, shortness of breath or wheezing?  7. NO - Do you sometimes get pains in the calves of your legs when you walk?  8. NO - Do you or anyone in your family have previous history of blood clots?  9. NO - Do you or does anyone in your family have a serious bleeding problem such as prolonged bleeding following surgeries or cuts?  10. NO - Have you ever had problems with anemia or been told to take iron pills?  11. NO - Have you had any abnormal blood loss such as black, tarry or bloody stools, or abnormal vaginal bleeding?  12. NO - Have you ever had a blood transfusion?  13. NO - Have you or  any of your relatives ever had problems with anesthesia?  14. NO - Do you have sleep apnea, excessive snoring or daytime drowsiness?  15. NO - Do you have any prosthetic heart valves?  16. NO - Do you have prosthetic joints?  17. NO - Is there any chance that you may be pregnant?      HPI:     HPI related to upcoming procedure: rt knee pain with significant meniscal injury on MRI      HYPERTENSION - Patient has longstanding history of HTN , currently denies any symptoms referable to elevated blood pressure. Specifically denies chest pain, palpitations, dyspnea, orthopnea, PND or peripheral edema. Blood pressure readings have been in normal range. Current medication regimen is as listed below. Patient denies any side effects of medication.                                                                                                                                                                                          .    MEDICAL HISTORY:     Patient Active Problem List    Diagnosis Date Noted     Complex tear of medial meniscus of right knee as current injury, subsequent encounter 03/13/2018     Priority: Medium     Chondromalacia of right knee 03/13/2018     Priority: Medium     H. pylori infection 03/01/2018     Priority: Medium     Gastroesophageal reflux disease without esophagitis 02/04/2016     Priority: Medium     Cataract extraction status of right eye 03/12/2015     Priority: Medium     DJD (degenerative joint disease), lumbar 02/12/2013     Priority: Medium     See MRI 2012 (L L5-S1)       Essential hypertension 12/14/2010     Priority: Medium     Disorder of sacrum 11/26/2010     Priority: Medium     Problem list name updated by automated process. Provider to review       Lumbago 11/26/2010     Priority: Medium     CARDIOVASCULAR SCREENING; LDL GOAL LESS THAN 130 10/31/2010     Priority: Medium     Erectile dysfunction 04/21/2010     Priority: Medium      Past Medical History:   Diagnosis Date     DDD  (degenerative disc disease), lumbar      DJD (degenerative joint disease), lumbar      Hypertension goal BP (blood pressure) < 140/90 12/14/2010     Past Surgical History:   Procedure Laterality Date     COLONOSCOPY  10/14/2011    Procedure:COLONOSCOPY; Colonoscopy, change in stools, diarrhea; Surgeon:ALEIDA DELEON; Location:[facility] OR     COLONOSCOPY  10/14/2011    Procedure:COMBINED COLONOSCOPY, SINGLE BIOPSY/POLYPECTOMY BY BIOPSY; Surgeon:ALEIDA DELEON; Location:[facility] OR     ORTHOPEDIC SURGERY       TONSILLECTOMY      age 11 years     Current Outpatient Prescriptions   Medication Sig Dispense Refill     naproxen (NAPROSYN) 500 MG tablet Take 1 tablet (500 mg) by mouth 2 times daily as needed for moderate pain 30 tablet 0     clarithromycin (BIAXIN) 500 MG tablet Take 1 tablet (500 mg) by mouth 2 times daily for 14 days 28 tablet 0     omeprazole (PRILOSEC) 20 MG CR capsule Take 1 capsule (20 mg) by mouth 2 times daily for 14 days 28 capsule 0     amoxicillin (AMOXIL) 500 MG capsule Take 2 capsules (1,000 mg) by mouth 2 times daily for 14 days 56 capsule 0     probiotic CAPS VSL Probiotic- Take 2 caps daily while on antibiotics 30 capsule 0     atenolol-chlorthalidone (TENORETIC) 50-25 MG per tablet TAKE ONE TABLET BY MOUTH ONE TIME DAILY  30 tablet 6     omeprazole (PRILOSEC) 20 MG capsule Take 1 capsule (20 mg) by mouth daily (Patient taking differently: Take 20 mg by mouth as needed ) 90 capsule 3     aspirin 81 MG tablet Take 81 mg by mouth as needed  90 tablet 3     OTC products: None, except as noted above    No Known Allergies   Latex Allergy: NO    Social History   Substance Use Topics     Smoking status: Never Smoker     Smokeless tobacco: Never Used     Alcohol use No     History   Drug Use No       REVIEW OF SYSTEMS:   CONSTITUTIONAL: NEGATIVE for fever, chills, change in weight  INTEGUMENTARY/SKIN: NEGATIVE for worrisome rashes, moles or lesions  EYES: NEGATIVE for vision changes or irritation  ENT/MOUTH:  "NEGATIVE for ear, mouth and throat problems  RESP: NEGATIVE for significant cough or SOB  CV: NEGATIVE for chest pain, palpitations or peripheral edema  GI: + completing H pylori tx today, feeling better  : NEGATIVE for frequency, dysuria, or hematuria  MUSCULOSKELETAL: + knee pain as above  NEURO: NEGATIVE for weakness, dizziness or paresthesias  ENDOCRINE: NEGATIVE for temperature intolerance, skin/hair changes  HEME: NEGATIVE for bleeding problems  PSYCHIATRIC: NEGATIVE for changes in mood or affect    EXAM:   /71 (BP Location: Left arm, Patient Position: Chair, Cuff Size: Adult Regular)  Pulse 67  Temp 97.7  F (36.5  C) (Oral)  Ht 5' 6\" (1.676 m)  Wt 178 lb 12.8 oz (81.1 kg)  SpO2 97%  BMI 28.86 kg/m2    GENERAL APPEARANCE: healthy, alert and no distress     EYES: EOMI,  PERRL     HENT: ear canals and TM's normal and nose and mouth without ulcers or lesions     NECK: no adenopathy, no asymmetry, masses, or scars and thyroid normal to palpation     RESP: lungs clear to auscultation - no rales, rhonchi or wheezes     CV: regular rates and rhythm, normal S1 S2, no S3 or S4 and no murmur, click or rub     ABDOMEN:  soft, nontender, no HSM or masses and bowel sounds normal     MS: extremities normal- no gross deformities noted, no evidence of inflammation in joints, FROM in all extremities.     SKIN: no suspicious lesions or rashes     NEURO: Normal strength and tone, sensory exam grossly normal, mentation intact and speech normal     PSYCH: mentation appears normal. and affect normal/bright     LYMPHATICS: No cervical adenopathy    DIAGNOSTICS:   EKG: Not indicated due to non-vascular surgery and low risk of event (age <65 and without perioperative cardiac risk factors)    Recent Labs   Lab Test  02/26/18   1043  02/17/17   0902  02/08/17   0843  11/30/16   0848   HGB   --   14.9   --   15.2   PLT   --   280   --   297   INR   --    --    --   0.97   NA  140   --   142  142   POTASSIUM  3.3*   --   " 3.2*  3.2*   CR  0.85   --   1.12  1.03        IMPRESSION:   Reason for surgery/procedure: meniscal knee inj    The proposed surgical procedure is considered LOW risk.    REVISED CARDIAC RISK INDEX  The patient has the following serious cardiovascular risks for perioperative complications such as (MI, PE, VFib and 3  AV Block):  No serious cardiac risks  INTERPRETATION: 0 risks: Class I (very low risk - 0.4% complication rate)    The patient has the following additional risks for perioperative complications:  No identified additional risks      ICD-10-CM    1. Preop general physical exam Z01.818    2. Complex tear of medial meniscus of right knee as current injury, subsequent encounter S83.231D        RECOMMENDATIONS:        --Patient is to take all scheduled medications on the day of surgery EXCEPT for modifications listed below.    APPROVAL GIVEN to proceed with proposed procedure, without further diagnostic evaluation       Signed Electronically by: ROLY Paula  Reviewed and agree with plan.  Mamta Weldon M.D.     Copy of this evaluation report is provided to requesting physician.    [facility] Preop Guidelines

## 2018-03-15 NOTE — PROGRESS NOTES
Patient is having surgery at [facility], they are able to view our EPIC for all pre-op information.  Ren Blake,  For Teams Comfort and Heart

## 2018-03-15 NOTE — ANESTHESIA PREPROCEDURE EVALUATION
Physical Exam  Normal systems: cardiovascular, pulmonary and dental    Airway   Mallampati: II  TM distance: >3 FB  Neck ROM: full    Dental     Cardiovascular   Rhythm and rate: regular and normal      Pulmonary    breath sounds clear to auscultation                    Anesthesia Plan      History & Physical Review  History and physical reviewed and following examination; no interval change.    ASA Status:  2 .    NPO Status:  > 8 hours    Plan for General and LMA with Intravenous and Propofol induction. Maintenance will be Balanced.    PONV prophylaxis:  Ondansetron (or other 5HT-3) and Dexamethasone or Solumedrol       Postoperative Care  Postoperative pain management:  IV analgesics, Oral pain medications and Multi-modal analgesia.      Consents  Anesthetic plan, risks, benefits and alternatives discussed with:  Patient..          ANESTHESIA PREOP EVALUATION    PROCEDURE: Procedure(s):  Right knee arthroscopy, debridement  - Wound Class:     HPI: Thanh Loaiza is a 52 year old male who presents for above procedure.    PAST MEDICAL HISTORY:    Past Medical History:   Diagnosis Date     DDD (degenerative disc disease), lumbar      DJD (degenerative joint disease), lumbar      Hypertension goal BP (blood pressure) < 140/90 12/14/2010       PAST SURGICAL HISTORY:    Past Surgical History:   Procedure Laterality Date     COLONOSCOPY  10/14/2011    Procedure:COLONOSCOPY; Colonoscopy, change in stools, diarrhea; Surgeon:ALEIDA DELEON; Location: OR     COLONOSCOPY  10/14/2011    Procedure:COMBINED COLONOSCOPY, SINGLE BIOPSY/POLYPECTOMY BY BIOPSY; Surgeon:ALEIDA DELEON; Location: OR     ORTHOPEDIC SURGERY       TONSILLECTOMY      age 11 years       PAST ANESTHESIA HISTORY:     No personal or family h/o anesthesia problems    SOCIAL HISTORY:       Social History   Substance Use Topics     Smoking status: Never Smoker     Smokeless tobacco: Never Used     Alcohol use No       ALLERGIES:     No Known  Allergies    MEDICATIONS:       (Not in a hospital admission)    Current Outpatient Prescriptions   Medication Sig Dispense Refill     naproxen (NAPROSYN) 500 MG tablet Take 1 tablet (500 mg) by mouth 2 times daily as needed for moderate pain 30 tablet 0     clarithromycin (BIAXIN) 500 MG tablet Take 1 tablet (500 mg) by mouth 2 times daily for 14 days 28 tablet 0     amoxicillin (AMOXIL) 500 MG capsule Take 2 capsules (1,000 mg) by mouth 2 times daily for 14 days 56 capsule 0     probiotic CAPS VSL Probiotic- Take 2 caps daily while on antibiotics 30 capsule 0     atenolol-chlorthalidone (TENORETIC) 50-25 MG per tablet TAKE ONE TABLET BY MOUTH ONE TIME DAILY  30 tablet 6     omeprazole (PRILOSEC) 20 MG capsule Take 1 capsule (20 mg) by mouth daily (Patient taking differently: Take 20 mg by mouth as needed ) 90 capsule 3     aspirin 81 MG tablet Take 81 mg by mouth as needed  90 tablet 3     omeprazole (PRILOSEC) 20 MG CR capsule Take 1 capsule (20 mg) by mouth 2 times daily for 14 days 28 capsule 0       Current Outpatient Prescriptions Ordered in Epic   Medication Sig Dispense Refill     naproxen (NAPROSYN) 500 MG tablet Take 1 tablet (500 mg) by mouth 2 times daily as needed for moderate pain 30 tablet 0     clarithromycin (BIAXIN) 500 MG tablet Take 1 tablet (500 mg) by mouth 2 times daily for 14 days 28 tablet 0     amoxicillin (AMOXIL) 500 MG capsule Take 2 capsules (1,000 mg) by mouth 2 times daily for 14 days 56 capsule 0     probiotic CAPS VSL Probiotic- Take 2 caps daily while on antibiotics 30 capsule 0     atenolol-chlorthalidone (TENORETIC) 50-25 MG per tablet TAKE ONE TABLET BY MOUTH ONE TIME DAILY  30 tablet 6     omeprazole (PRILOSEC) 20 MG capsule Take 1 capsule (20 mg) by mouth daily (Patient taking differently: Take 20 mg by mouth as needed ) 90 capsule 3     aspirin 81 MG tablet Take 81 mg by mouth as needed  90 tablet 3     omeprazole (PRILOSEC) 20 MG CR capsule Take 1 capsule (20 mg) by mouth 2  times daily for 14 days 28 capsule 0     No current Epic-ordered facility-administered medications on file.        PHYSICAL EXAM:    Vitals: T Data Unavailable, P Data Unavailable, BP Data Unavailable, R Data Unavailable, SpO2  , Weight Wt Readings from Last 2 Encounters:   03/14/18 81.1 kg (178 lb 12.8 oz)   03/13/18 82.6 kg (182 lb)       See doc flowsheet      No Data Recorded    NPO STATUS: see doc flowsheet    LABS:    BMP:  Recent Labs   Lab Test  02/26/18   1043   NA  140   POTASSIUM  3.3*   CHLORIDE  104   CO2  28   BUN  16   CR  0.85   GLC  102*   AMY  9.3       LFTs:   Recent Labs   Lab Test  02/26/18   1043   PROTTOTAL  7.7   ALBUMIN  3.8   BILITOTAL  0.5   ALKPHOS  52   AST  33   ALT  56       CBC:   Recent Labs   Lab Test  02/17/17   0902   WBC  6.8   RBC  5.63   HGB  14.9   HCT  45.9   MCV  82   MCH  26.5   MCHC  32.5   RDW  13.8   PLT  280       Coags:  Recent Labs   Lab Test  11/30/16   0848   INR  0.97       Imaging:  No orders to display       Kurt Horner MD  Anesthesiology Staff  Pager [phone]    3/15/2018  2:23 PM                    .

## 2018-03-16 ENCOUNTER — HOSPITAL ENCOUNTER (OUTPATIENT)
Facility: AMBULATORY SURGERY CENTER | Age: 53
Discharge: HOME | End: 2018-03-16
Attending: ORTHOPAEDIC SURGERY | Admitting: ORTHOPAEDIC SURGERY
Payer: COMMERCIAL

## 2018-03-16 ENCOUNTER — SURGERY (OUTPATIENT)
Age: 53
End: 2018-03-16

## 2018-03-16 ENCOUNTER — ANESTHESIA (OUTPATIENT)
Dept: SURGERY | Facility: AMBULATORY SURGERY CENTER | Age: 53
End: 2018-03-16
Payer: COMMERCIAL

## 2018-03-16 VITALS
DIASTOLIC BLOOD PRESSURE: 71 MMHG | SYSTOLIC BLOOD PRESSURE: 117 MMHG | TEMPERATURE: 97.2 F | RESPIRATION RATE: 8 BRPM | OXYGEN SATURATION: 96 %

## 2018-03-16 DIAGNOSIS — M94.261 CHONDROMALACIA OF RIGHT KNEE: ICD-10-CM

## 2018-03-16 DIAGNOSIS — S83.231D COMPLEX TEAR OF MEDIAL MENISCUS OF RIGHT KNEE AS CURRENT INJURY, SUBSEQUENT ENCOUNTER: Primary | ICD-10-CM

## 2018-03-16 PROCEDURE — G8916 PT W IV AB GIVEN ON TIME: HCPCS

## 2018-03-16 PROCEDURE — 29881 ARTHRS KNE SRG MNISECTMY M/L: CPT | Mod: RT

## 2018-03-16 PROCEDURE — G8907 PT DOC NO EVENTS ON DISCHARG: HCPCS

## 2018-03-16 PROCEDURE — 29881 ARTHRS KNE SRG MNISECTMY M/L: CPT | Mod: RT | Performed by: ORTHOPAEDIC SURGERY

## 2018-03-16 RX ORDER — DEXAMETHASONE SODIUM PHOSPHATE 4 MG/ML
INJECTION, SOLUTION INTRA-ARTICULAR; INTRALESIONAL; INTRAMUSCULAR; INTRAVENOUS; SOFT TISSUE PRN
Status: DISCONTINUED | OUTPATIENT
Start: 2018-03-16 | End: 2018-03-16

## 2018-03-16 RX ORDER — HYDROMORPHONE HYDROCHLORIDE 1 MG/ML
.3-.5 INJECTION, SOLUTION INTRAMUSCULAR; INTRAVENOUS; SUBCUTANEOUS EVERY 10 MIN PRN
Status: DISCONTINUED | OUTPATIENT
Start: 2018-03-16 | End: 2018-03-17 | Stop reason: HOSPADM

## 2018-03-16 RX ORDER — ACETAMINOPHEN 325 MG/1
975 TABLET ORAL ONCE
Status: COMPLETED | OUTPATIENT
Start: 2018-03-16 | End: 2018-03-16

## 2018-03-16 RX ORDER — PROPOFOL 10 MG/ML
INJECTION, EMULSION INTRAVENOUS PRN
Status: DISCONTINUED | OUTPATIENT
Start: 2018-03-16 | End: 2018-03-16

## 2018-03-16 RX ORDER — ONDANSETRON 4 MG/1
4 TABLET, ORALLY DISINTEGRATING ORAL EVERY 30 MIN PRN
Status: DISCONTINUED | OUTPATIENT
Start: 2018-03-16 | End: 2018-03-17 | Stop reason: HOSPADM

## 2018-03-16 RX ORDER — LIDOCAINE 40 MG/G
CREAM TOPICAL
Status: DISCONTINUED | OUTPATIENT
Start: 2018-03-16 | End: 2018-03-17 | Stop reason: HOSPADM

## 2018-03-16 RX ORDER — ONDANSETRON 2 MG/ML
4 INJECTION INTRAMUSCULAR; INTRAVENOUS EVERY 30 MIN PRN
Status: DISCONTINUED | OUTPATIENT
Start: 2018-03-16 | End: 2018-03-17 | Stop reason: HOSPADM

## 2018-03-16 RX ORDER — FENTANYL CITRATE 50 UG/ML
25-50 INJECTION, SOLUTION INTRAMUSCULAR; INTRAVENOUS
Status: DISCONTINUED | OUTPATIENT
Start: 2018-03-16 | End: 2018-03-17 | Stop reason: HOSPADM

## 2018-03-16 RX ORDER — SODIUM CHLORIDE, SODIUM LACTATE, POTASSIUM CHLORIDE, CALCIUM CHLORIDE 600; 310; 30; 20 MG/100ML; MG/100ML; MG/100ML; MG/100ML
INJECTION, SOLUTION INTRAVENOUS CONTINUOUS
Status: DISCONTINUED | OUTPATIENT
Start: 2018-03-16 | End: 2018-03-17 | Stop reason: HOSPADM

## 2018-03-16 RX ORDER — ONDANSETRON 2 MG/ML
INJECTION INTRAMUSCULAR; INTRAVENOUS PRN
Status: DISCONTINUED | OUTPATIENT
Start: 2018-03-16 | End: 2018-03-16

## 2018-03-16 RX ORDER — LIDOCAINE HYDROCHLORIDE 20 MG/ML
INJECTION, SOLUTION INFILTRATION; PERINEURAL PRN
Status: DISCONTINUED | OUTPATIENT
Start: 2018-03-16 | End: 2018-03-16

## 2018-03-16 RX ORDER — CEFAZOLIN SODIUM 1 G/3ML
1 INJECTION, POWDER, FOR SOLUTION INTRAMUSCULAR; INTRAVENOUS SEE ADMIN INSTRUCTIONS
Status: DISCONTINUED | OUTPATIENT
Start: 2018-03-16 | End: 2018-03-17 | Stop reason: HOSPADM

## 2018-03-16 RX ORDER — KETOROLAC TROMETHAMINE 30 MG/ML
INJECTION, SOLUTION INTRAMUSCULAR; INTRAVENOUS PRN
Status: DISCONTINUED | OUTPATIENT
Start: 2018-03-16 | End: 2018-03-16

## 2018-03-16 RX ORDER — BUPIVACAINE HYDROCHLORIDE AND EPINEPHRINE 2.5; 5 MG/ML; UG/ML
INJECTION, SOLUTION INFILTRATION; PERINEURAL PRN
Status: DISCONTINUED | OUTPATIENT
Start: 2018-03-16 | End: 2018-03-16 | Stop reason: HOSPADM

## 2018-03-16 RX ORDER — MEPERIDINE HYDROCHLORIDE 25 MG/ML
12.5 INJECTION INTRAMUSCULAR; INTRAVENOUS; SUBCUTANEOUS
Status: DISCONTINUED | OUTPATIENT
Start: 2018-03-16 | End: 2018-03-17 | Stop reason: HOSPADM

## 2018-03-16 RX ORDER — NALOXONE HYDROCHLORIDE 0.4 MG/ML
.1-.4 INJECTION, SOLUTION INTRAMUSCULAR; INTRAVENOUS; SUBCUTANEOUS
Status: DISCONTINUED | OUTPATIENT
Start: 2018-03-16 | End: 2018-03-17 | Stop reason: HOSPADM

## 2018-03-16 RX ORDER — HYDROCODONE BITARTRATE AND ACETAMINOPHEN 5; 325 MG/1; MG/1
1-2 TABLET ORAL EVERY 4 HOURS PRN
Qty: 24 TABLET | Refills: 0 | Status: SHIPPED | OUTPATIENT
Start: 2018-03-16 | End: 2018-05-31

## 2018-03-16 RX ORDER — GABAPENTIN 300 MG/1
300 CAPSULE ORAL ONCE
Status: COMPLETED | OUTPATIENT
Start: 2018-03-16 | End: 2018-03-16

## 2018-03-16 RX ORDER — FENTANYL CITRATE 50 UG/ML
INJECTION, SOLUTION INTRAMUSCULAR; INTRAVENOUS PRN
Status: DISCONTINUED | OUTPATIENT
Start: 2018-03-16 | End: 2018-03-16

## 2018-03-16 RX ORDER — CEFAZOLIN SODIUM 2 G/100ML
2 INJECTION, SOLUTION INTRAVENOUS
Status: COMPLETED | OUTPATIENT
Start: 2018-03-16 | End: 2018-03-16

## 2018-03-16 RX ADMIN — DEXAMETHASONE SODIUM PHOSPHATE 4 MG: 4 INJECTION, SOLUTION INTRA-ARTICULAR; INTRALESIONAL; INTRAMUSCULAR; INTRAVENOUS; SOFT TISSUE at 10:20

## 2018-03-16 RX ADMIN — FENTANYL CITRATE 50 MCG: 50 INJECTION, SOLUTION INTRAMUSCULAR; INTRAVENOUS at 10:10

## 2018-03-16 RX ADMIN — ONDANSETRON 4 MG: 2 INJECTION INTRAMUSCULAR; INTRAVENOUS at 10:40

## 2018-03-16 RX ADMIN — FENTANYL CITRATE 50 MCG: 50 INJECTION, SOLUTION INTRAMUSCULAR; INTRAVENOUS at 10:27

## 2018-03-16 RX ADMIN — PROPOFOL 200 MG: 10 INJECTION, EMULSION INTRAVENOUS at 10:10

## 2018-03-16 RX ADMIN — LIDOCAINE HYDROCHLORIDE 40 MG: 20 INJECTION, SOLUTION INFILTRATION; PERINEURAL at 10:10

## 2018-03-16 RX ADMIN — ACETAMINOPHEN 975 MG: 325 TABLET ORAL at 09:35

## 2018-03-16 RX ADMIN — CEFAZOLIN SODIUM 2 G: 2 INJECTION, SOLUTION INTRAVENOUS at 10:06

## 2018-03-16 RX ADMIN — KETOROLAC TROMETHAMINE 30 MG: 30 INJECTION, SOLUTION INTRAMUSCULAR; INTRAVENOUS at 10:46

## 2018-03-16 RX ADMIN — BUPIVACAINE HYDROCHLORIDE AND EPINEPHRINE 30 ML: 2.5; 5 INJECTION, SOLUTION INFILTRATION; PERINEURAL at 10:40

## 2018-03-16 RX ADMIN — SODIUM CHLORIDE, SODIUM LACTATE, POTASSIUM CHLORIDE, CALCIUM CHLORIDE: 600; 310; 30; 20 INJECTION, SOLUTION INTRAVENOUS at 10:02

## 2018-03-16 RX ADMIN — GABAPENTIN 300 MG: 300 CAPSULE ORAL at 09:35

## 2018-03-16 NOTE — IP AVS SNAPSHOT
MRN:[id number]                      After Visit Summary   3/16/2018    Thanh Loaiza    MRN: [id number]           Thank you!     Thank you for choosing [facility] for your care. Our goal is always to provide you with excellent care. Hearing back from our patients is one way we can continue to improve our services. Please take a few minutes to complete the written survey that you may receive in the mail after you visit with us. Thank you!        Patient Information     Date Of Birth          1965        About your hospital stay     You were admitted on:  March 16, 2018 [place]  [facility] [place] discharged on:  March 16, 2018       Who to Call     For medical emergencies, please call 911.  For non-urgent questions about your medical care, please call your primary care provider or clinic, [phone]  For questions related to your surgery, please call your surgery clinic        Attending Provider     Provider Specialty    Dale Lester MD Orthopedics       Primary Care Provider Office Phone # Fax #    Saravanan Bocanegra [phone]      After Care Instructions     Discharge Instructions       Review outpatient procedure discharge instructions with patient as directed by Provider  You may walk on the leg as soon as you wish.  Bend the knee as soon as you wish.    Most people use crutches for a couple days.  Leave initial dressing for 2 days.  Then you may remove dressing and shower.  Rewrap with ace wrap.  Leave steri-strips in place.  Weight bearing as tolerated.  Apply ice or cold packs intermittently as needed to relieve pain.   Take pain medications as much as needed, but as little as you can get away with.  They do cause constipation.  May use Aleve up to 4 tablets per day or ibuprofen up to 12 tablets per day if desired.  Aspirin - Take 1 tablet (325 mg) by mouth daily for blood thinning for 2-3 weeks.  Most people take about a  week off work.  Return to clinic 1 week after surgery.                  Your next 10 appointments already scheduled     Mar 22, 2018  1:45 PM CDT   Return Visit with Dale Lester MD   [facility] [place] ([facility])    [address]  [place] [address]   [phone]              Further instructions from your care team       [facility]  Same-Day Surgery   Adult Discharge Orders & Instructions   For 24 hours after surgery  1. Get plenty of rest.  A responsible adult must stay with you for at least 24 hours after you leave the hospital.   2. Do not drive or use heavy equipment.  If you have weakness or tingling, don't drive or use heavy equipment until this feeling goes away.  3. Do not drink alcohol.  4. Avoid strenuous or risky activities.  Ask for help when climbing stairs.   5. You may feel lightheaded.  IF so, sit for a few minutes before standing.  Have someone help you get up.   6. If you have nausea (feel sick to your stomach): Drink only clear liquids such as apple juice, ginger ale, broth or 7-Up.  Rest may also help.  Be sure to drink enough fluids.  Move to a regular diet as you feel able.  7. You may have a slight fever. Call the doctor if your fever is over 100 F (37.7 C) (taken under the tongue) or lasts longer than 24 hours.  8. You may have a dry mouth, a sore throat, muscle aches or trouble sleeping.  These should go away after 24 hours.  9. Do not make important or legal decisions.   Call your doctor for any of the followin.  Signs of infection (fever, growing tenderness at the surgery site, a large amount of drainage or bleeding, severe pain, foul-smelling drainage, redness, swelling).    2. It has been over 8 to 10 hours since surgery and you are still not able to urinate (pass water).    3.  Headache for over 24 hours.    4.  Numbness, tingling or weakness the day after surgery (if you had spinal anesthesia).      Pending  Results     No orders found from 3/14/2018 to 3/17/2018.            Admission Information     Date & Time Provider Department Dept. Phone    3/16/2018 Dale Lester MD [facility] [place] [phone]      Your Vitals Were     Blood Pressure Temperature Respirations Pulse Oximetry          105/63 97.2  F (36.2  C) (Temporal) 17 95%        MyChart Information     MyChart gives you secure access to your electronic health record. If you see a primary care provider, you can also send messages to your care team and make appointments. If you have questions, please call your primary care clinic.  If you do not have a primary care provider, please call [phone] and they will assist you.        Care EveryWhere ID     This is your Care EveryWhere ID. This could be used by other organizations to access your [facility] medical records  [id number]        Equal Access to Services     KHUSHBU FORD : Mariajose Cha, candelaria rios, john oswald, corwin rolon. So [facility] [phone].    ATENCIÓN: Si habla español, tiene a powers disposición servicios gratuitos de asistencia lingüística. Lorene al [phone].    We comply with applicable federal civil rights laws and [place] laws. We do not discriminate on the basis of race, color, national origin, age, disability, sex, sexual orientation, or gender identity.               Review of your medicines      UNREVIEWED medicines. Ask your doctor about these medicines        Dose / Directions    amoxicillin 500 MG capsule   Commonly known as:  AMOXIL   Used for:  H. pylori infection   Ask about: Should I take this medication?        Dose:  1000 mg   Take 2 capsules (1,000 mg) by mouth 2 times daily for 14 days   Quantity:  56 capsule   Refills:  0       clarithromycin 500 MG tablet   Commonly known as:  BIAXIN   Used for:  H. pylori infection   Ask about: Should I take this medication?        Dose:  500 mg   Take 1  tablet (500 mg) by mouth 2 times daily for 14 days   Quantity:  28 tablet   Refills:  0       * omeprazole 20 MG CR capsule   Commonly known as:  priLOSEC   Used for:  H. pylori infection   Ask about: Should I take this medication?        Dose:  20 mg   Take 1 capsule (20 mg) by mouth 2 times daily for 14 days   Quantity:  28 capsule   Refills:  0       * Notice:  This list has 1 medication(s) that are the same as other medications prescribed for you. Read the directions carefully, and ask your doctor or other care provider to review them with you.      START taking        Dose / Directions    HYDROcodone-acetaminophen 5-325 MG per tablet   Commonly known as:  NORCO   Used for:  Complex tear of medial meniscus of right knee as current injury, subsequent encounter, Chondromalacia of right knee        Dose:  1-2 tablet   Take 1-2 tablets by mouth every 4 hours as needed for other (Moderate to Severe Pain)   Quantity:  24 tablet   Refills:  0         CONTINUE these medicines which may have CHANGED, or have new prescriptions. If we are uncertain of the size of tablets/capsules you have at home, strength may be listed as something that might have changed.        Dose / Directions    * omeprazole 20 MG CR capsule   Commonly known as:  priLOSEC   This may have changed:    - when to take this  - reasons to take this   Used for:  Gastroesophageal reflux disease without esophagitis        Dose:  20 mg   Take 1 capsule (20 mg) by mouth daily   Quantity:  90 capsule   Refills:  3       * Notice:  This list has 1 medication(s) that are the same as other medications prescribed for you. Read the directions carefully, and ask your doctor or other care provider to review them with you.      CONTINUE these medicines which have NOT CHANGED        Dose / Directions    aspirin 81 MG tablet        Dose:  81 mg   Take 81 mg by mouth as needed   Quantity:  90 tablet   Refills:  3       atenolol-chlorthalidone 50-25 MG per tablet   Commonly  known as:  TENORETIC   Used for:  Essential hypertension with goal blood pressure less than 140/90        TAKE ONE TABLET BY MOUTH ONE TIME DAILY   Quantity:  30 tablet   Refills:  6       naproxen 500 MG tablet   Commonly known as:  NAPROSYN   Used for:  Effusion of bursa of right knee        Dose:  500 mg   Take 1 tablet (500 mg) by mouth 2 times daily as needed for moderate pain   Quantity:  30 tablet   Refills:  0       probiotic Caps   Used for:  H. pylori infection        VSL Probiotic- Take 2 caps daily while on antibiotics   Quantity:  30 capsule   Refills:  0            Where to get your medicines      Some of these will need a paper prescription and others can be bought over the counter. Ask your nurse if you have questions.     Bring a paper prescription for each of these medications     HYDROcodone-acetaminophen 5-325 MG per tablet                Protect others around you: Learn how to safely use, store and throw away your medicines at www.disposemymeds.org.        Information about OPIOIDS     PRESCRIPTION OPIOIDS: WHAT YOU NEED TO KNOW    Prescription opioids can be used to help relieve moderate to severe pain and are often prescribed following a surgery or injury, or for certain health conditions. These medications can be an important part of treatment but also come with serious risks. It is important to work with your health care provider to make sure you are getting the safest, most effective care.    WHAT ARE THE RISKS AND SIDE EFFECTS OF OPIOID USE?  Prescription opioids carry serious risks of addiction and overdose, especially with prolonged use. An opioid overdose, often marked by slowed breathing can cause sudden death. The use of prescription opioids can have a number of side effects as well, even when taken as directed:      Tolerance - meaning you might need to take more of a medication for the same pain relief    Physical dependence - meaning you have symptoms of withdrawal when a  medication is stopped    Increased sensitivity to pain    Constipation    Nausea, vomiting, and dry mouth    Sleepiness and dizziness    Confusion    Depression    Low levels of testosterone that can result in lower sex drive, energy, and strength    Itching and sweating    RISKS ARE GREATER WITH:    History of drug misuse, substance use disorder, or overdose    Mental health conditions (such as depression or anxiety)    Sleep apnea    Older age (65 years or older)    Pregnancy    Avoid alcohol while taking prescription opioids.   Also, unless specifically advised by your health care provider, medications to avoid include:    Benzodiazepines (such as Xanax or Valium)    Muscle relaxants (such as Soma or Flexeril)    Hypnotics (such as Ambien or Lunesta)    Other prescription opioids    KNOW YOUR OPTIONS:  Talk to your health care provider about ways to manage your pain that do not involve prescription opioids. Some of these options may actually work better and have fewer risks and side effects:    Pain relievers such as acetaminophen, ibuprofen, and naproxen    Some medications that are also used for depression or seizures    Physical therapy and exercise    Cognitive behavioral therapy, a psychological, goal-directed approach, in which patients learn how to modify physical, behavioral, and emotional triggers of pain and stress    IF YOU ARE PRESCRIBED OPIOIDS FOR PAIN:    Never take opioids in greater amounts or more often than prescribed    Follow up with your primary health care provider and work together to create a plan on how to manage your pain.    Talk about ways to help manage your pain that do not involve prescription opioids    Talk about all concerns and side effects    Help prevent misuse and abuse    Never sell or share prescription opioids    Never use another person's prescription opioids    Store prescription opioids in a secure place and out of reach of others (this may include visitors, children,  friends, and family)    Visit www.cdc.gov/drugoverdose to learn about risks of opioid abuse and overdose    If you believe you may be struggling with addiction, tell your health care provider and ask for guidance or call [facility]'s National Helpline at 5-756-913-HELP    LEARN MORE / www.cdc.gov/drugoverdose/prescribing/guideline.html    Safely dispose of unused prescription opioids: Find your local drug take-back programs and more information about the importance of safe disposal at www.doseofreality.[place].gov             Medication List: This is a list of all your medications and when to take them. Check marks below indicate your daily home schedule. Keep this list as a reference.      Medications           Morning Afternoon Evening Bedtime As Needed    aspirin 81 MG tablet   Take 81 mg by mouth as needed                                atenolol-chlorthalidone 50-25 MG per tablet   Commonly known as:  TENORETIC   TAKE ONE TABLET BY MOUTH ONE TIME DAILY                                HYDROcodone-acetaminophen 5-325 MG per tablet   Commonly known as:  NORCO   Take 1-2 tablets by mouth every 4 hours as needed for other (Moderate to Severe Pain)                                naproxen 500 MG tablet   Commonly known as:  NAPROSYN   Take 1 tablet (500 mg) by mouth 2 times daily as needed for moderate pain                                * omeprazole 20 MG CR capsule   Commonly known as:  priLOSEC   Take 1 capsule (20 mg) by mouth daily                                probiotic Caps   VSL Probiotic- Take 2 caps daily while on antibiotics                                * Notice:  This list has 1 medication(s) that are the same as other medications prescribed for you. Read the directions carefully, and ask your doctor or other care provider to review them with you.      ASK your doctor about these medications           Morning Afternoon Evening Bedtime As Needed    amoxicillin 500 MG capsule   Commonly known as:  AMOXIL   Take 2  capsules (1,000 mg) by mouth 2 times daily for 14 days   Ask about: Should I take this medication?                                clarithromycin 500 MG tablet   Commonly known as:  BIAXIN   Take 1 tablet (500 mg) by mouth 2 times daily for 14 days   Ask about: Should I take this medication?                                * omeprazole 20 MG CR capsule   Commonly known as:  priLOSEC   Take 1 capsule (20 mg) by mouth 2 times daily for 14 days   Ask about: Should I take this medication?                                * Notice:  This list has 1 medication(s) that are the same as other medications prescribed for you. Read the directions carefully, and ask your doctor or other care provider to review them with you.

## 2018-03-16 NOTE — OP NOTE
Procedure Date: 03/16/2018      DATE OF SURGERY: 03/16/2018      PREOPERATIVE DIAGNOSES:     1.  Right knee medial meniscus tear.   2.  Chondromalacia patellofemoral and medial joint.      POSTOPERATIVE DIAGNOSES:     1.  Right knee medial meniscus tear.   2.  Chondromalacia medial joint and patellofemoral joint.   3.  Medial plica.      PROCEDURE:   1.  Right knee arthroscopy with partial medial meniscectomy.   2.  Chondroplasty of medial femur and patella.   3.  Plica excision.      HISTORY:  This is a 52-year-old male with right knee pain.  He has failed conservative treatment.  MRI scan shows a horizontal cleavage tear of the medial meniscus with what appears to be further degeneration of the inferior leaflet.  He also has chondromalacia of the medial joint and patellofemoral joint.  He presents now for arthroscopy and debridement.      SURGICAL FINDINGS:  The patellofemoral joint showed normal tracking, but grade II-III chondromalacia changes over much of the patella and trochlear groove.  There was no exposed bone, but there was significant loss of articular cartilage.  There was a medial plica with a firm edge band.  This appeared to be rubbing on the medial femur.  Medial joint showed a medial meniscus tear involving the posterior 1/3 with a horizontal cleavage tear with further degenerative tearing of both the inferior and superior leaflets.  There was grade II-III chondromalacia changes on the femur and grade I on the tibia.  Lateral joint showed some synovial strands, but no meniscus tear, no chondromalacia changes.  Cruciate ligaments were intact.  There was speckling throughout the knee that was reassembled either steroid or pseudogout deposition.      SURGERY:  After smooth general endotracheal anesthesia, the patient's right leg was exsanguinated and tourniquet inflated to 300 mmHg.  The leg was then prepped and draped in sterile fashion.  Pause was performed for patient verification.  Arthroscopy was  performed through 3 portals with the above findings.  A partial medial meniscectomy was performed with basket forceps and shaver, removing about 30-40% of the meniscal tissue.  We did leave a horizontal cleavage tear of the far posterior aspect with a little bit of the superior overhang but this tissue was felt to be entirely stable.  We tapered to an intact anterior half of the meniscus.  I did perform shaving of the medial femur as there was flaking cartilage here.  We also used a motorized shaver to remove the medial plica and flaking cartilage from the patella and trochlea.  At the conclusion, the knee was thoroughly irrigated and drained of fluid.  Portals were injected with 0.25% Marcaine and closed with Steri-Strips.  Sterile dressings were applied and the patient was taken to the recovery room in stable condition.      SURGEON:  Dael Carpenter MD.      ASSISTANT: GOPI John MD             D: 2018   T: 2018   MT: ABBIE      Name:     AKIRA LOVELACE   MRN:      [id number]        Account:        [id number]   :      1965           Procedure Date: 2018      Document: [id number]

## 2018-03-16 NOTE — ANESTHESIA POSTPROCEDURE EVALUATION
Patient: Thanh Loaiza    Procedure(s):  Right knee arthroscopy, debridement  - Wound Class: I-Clean    Diagnosis:Right knee medial meniscus tear  Diagnosis Additional Information: No value filed.    Anesthesia Type:  General, LMA    Note:  Anesthesia Post Evaluation    Patient location during evaluation: PACU  Patient participation: Able to fully participate in evaluation  Level of consciousness: awake and alert  Pain management: adequate  Airway patency: patent  Cardiovascular status: acceptable  Respiratory status: acceptable  Hydration status: acceptable  PONV: none     Anesthetic complications: None          Last vitals:  Vitals:    03/16/18 1115 03/16/18 1130 03/16/18 1145   BP: 134/77 116/70 117/71   Resp: 16 12 8   Temp:      SpO2: 95% 97% 96%         Electronically Signed By: Kurt Horner MD  March 16, 2018

## 2018-03-16 NOTE — IP AVS SNAPSHOT
[facility]    [address] [place] [address]    Phone:  [phone]                                       After Visit Summary   3/16/2018    Thanh Loaiza    MRN: [id number]           After Visit Summary Signature Page     I have received my discharge instructions, and my questions have been answered. I have discussed any challenges I see with this plan with the nurse or doctor.    ..........................................................................................................................................  Patient/Patient Representative Signature      ..........................................................................................................................................  Patient Representative Print Name and Relationship to Patient    ..................................................               ................................................  Date                                            Time    ..........................................................................................................................................  Reviewed by Signature/Title    ...................................................              ..............................................  Date                                                            Time

## 2018-03-16 NOTE — ANESTHESIA CARE TRANSFER NOTE
Patient: Thanh Loaiza    Procedure(s):  Right knee arthroscopy, debridement  - Wound Class: I-Clean    Diagnosis: Right knee medial meniscus tear  Diagnosis Additional Information: No value filed.    Anesthesia Type:   General, LMA     Note:  Airway :Face Mask  Patient transferred to:PACU  Comments: Prior to atraumatic LMA removal, patient awake, good aeration, SpO2 98%, equal bilateral breath sounds.  Transported to PACU on room air, SpO2 95% in PACU.  No apparent anesthesia complications.         Vitals: (Last set prior to Anesthesia Care Transfer)    CRNA VITALS  3/16/2018 1025 - 3/16/2018 1059      3/16/2018             NIBP: 104/65    Pulse: 77    NIBP Mean: 79    SpO2: 96 %    Resp Rate (observed): 18                Electronically Signed By: RIKI Maldonado CRNA  March 16, 2018  10:59 AM   [facility] [place]    [facility] Progress Note    Date of Service (when I saw the patient): 2017    Assessment & Plan   Assessment:  1 day old male , doing well.     Plan:  -Normal  care  -Anticipatory guidance given  -Encourage exclusive breastfeeding  -Maternal group B strep inadequately treated, observe per protocol until 48 hours ( at 3am)  -Bili low intermediate, follow clinically    Patient seen and discussed with attending physician, Dr. Villa.  Amilcar Lee MD MPH  Pediatrics Resident, PGY-1    Patient was examined, note reviewed and appropriate changes were made. Agree with resident documentation.   Tutu Villa MD    Interval History   Date and time of birth: 2019  2:27 AM    Stable, no new events    Risk factors for developing severe hyperbilirubinemia:None    Feeding: Breast feeding going well     I & O for past 24 hours  No data found.    Patient Vitals for the past 24 hrs:   Quality of Breastfeed   17 1200 Fair breastfeed   17 1500 Good breastfeed   17 1800 Good breastfeed   17 1830 Good breastfeed   17 2200 Good breastfeed   17 0035 Good breastfeed   17 0200 Good breastfeed   17 0430 Good breastfeed     Patient Vitals for the past 24 hrs:   Urine Occurrence Stool Occurrence   17 1200 1 1   17 1800 1 1   17 2200 - 1   17 0200 1 -   17 0500 1 -     Physical Exam   Vital Signs:  Patient Vitals for the past 24 hrs:   Temp Temp src Heart Rate Resp Weight   17 0900 98  F (36.7  C) Axillary 140 48 -   17 0300 - - - - 3.634 kg (8 lb 0.2 oz)   17 0035 98.3  F (36.8  C) Axillary 130 38 -   17 2000 98.1  F (36.7  C) Axillary 128 42 -   17 1530 98  F (36.7  C) Axillary 124 48 -     Wt Readings from Last 3 Encounters:   17 3.634 kg (8 lb 0.2 oz) (69 %)*     * Growth percentiles are based on WHO (Boys, 0-2 years) data.        Weight change since birth: -6%    General:  alert and normally responsive  Head/Neck:  normal anterior and posterior fontanelle, intact scalp; Neck without masses  Lungs:  clear, no retractions, no increased work of breathing  Heart:  normal rate, rhythm.  No murmurs.  Normal femoral pulses.  Abdomen:  soft without mass, tenderness, organomegaly, hernia.  Umbilicus normal.    Data   Results for orders placed or performed during the hospital encounter of 07/05/17 (from the past 24 hour(s))   Bilirubin Direct and Total   Result Value Ref Range    Bilirubin Direct 0.2 0.0 - 0.5 mg/dL    Bilirubin Total 6.7 0.0 - 8.2 mg/dL       bilitool

## 2018-03-16 NOTE — DISCHARGE INSTRUCTIONS
[facility]  Same-Day Surgery   Adult Discharge Orders & Instructions   For 24 hours after surgery  1. Get plenty of rest.  A responsible adult must stay with you for at least 24 hours after you leave the hospital.   2. Do not drive or use heavy equipment.  If you have weakness or tingling, don't drive or use heavy equipment until this feeling goes away.  3. Do not drink alcohol.  4. Avoid strenuous or risky activities.  Ask for help when climbing stairs.   5. You may feel lightheaded.  IF so, sit for a few minutes before standing.  Have someone help you get up.   6. If you have nausea (feel sick to your stomach): Drink only clear liquids such as apple juice, ginger ale, broth or 7-Up.  Rest may also help.  Be sure to drink enough fluids.  Move to a regular diet as you feel able.  7. You may have a slight fever. Call the doctor if your fever is over 100 F (37.7 C) (taken under the tongue) or lasts longer than 24 hours.  8. You may have a dry mouth, a sore throat, muscle aches or trouble sleeping.  These should go away after 24 hours.  9. Do not make important or legal decisions.   Call your doctor for any of the followin.  Signs of infection (fever, growing tenderness at the surgery site, a large amount of drainage or bleeding, severe pain, foul-smelling drainage, redness, swelling).    2. It has been over 8 to 10 hours since surgery and you are still not able to urinate (pass water).    3.  Headache for over 24 hours.    4.  Numbness, tingling or weakness the day after surgery (if you had spinal anesthesia).

## 2018-03-16 NOTE — BRIEF OP NOTE
PROCEDURE NOTE:    Pre-operative diagnosis: Right knee medial meniscus tear   Post-operative diagnosis: Right knee medial meniscus tear   Procedure: Procedure(s):  Right knee arthroscopy with partial medial meniscectomy   Surgeon: Dale Lester MD   Assistant(s): Eliecer Wallace PA-C   Estimated blood loss: Minimal   Specimens: None   Findings: Right knee medial meniscus tear   Anesthesia: General endotracheal anesthesia   Drains: None   Complications: None   Weight bearing status: Weight bearing as tolerated   Activity: Activity as tolerated  Patient may move about with assist as indicated or with supervision     Eliecer Wallace PA-C  Supervising physician: Dale Lester MD  Dept. of Orthopedics  [facility]

## 2018-03-22 ENCOUNTER — OFFICE VISIT (OUTPATIENT)
Dept: ORTHOPEDICS | Facility: CLINIC | Age: 53
End: 2018-03-22
Payer: COMMERCIAL

## 2018-03-22 VITALS — BODY MASS INDEX: 28.93 KG/M2 | WEIGHT: 180 LBS | RESPIRATION RATE: 13 BRPM | HEIGHT: 66 IN

## 2018-03-22 DIAGNOSIS — Z98.890 S/P ARTHROSCOPY OF RIGHT KNEE: Primary | ICD-10-CM

## 2018-03-22 PROCEDURE — 99024 POSTOP FOLLOW-UP VISIT: CPT | Performed by: ORTHOPAEDIC SURGERY

## 2018-03-22 NOTE — LETTER
3/22/2018         RE: Thanh Loaiza  [address]  [place] [address]        Dear Colleague,    Thank you for referring your patient, Thanh Loaiza, to the [facility]. Please see a copy of my visit note below.    Follow-up right knee arthroscopy with 30% partial medial meniscectomy, plica excision on 3/16/18.   Wounds are healing well.    No warmth or erythema.  He has moderate effusion.   There is severe pain.    He has significant quadriceps weakness.  Very limited range of motion:  degrees.    Assessment:  Right knee arthroscopy with partial medial meniscectomy with pain.  I reviewed the operative findings.  Will start range of motion and strengthening.  Scar massage.  Advance activity as tolerated.  Pain medication refill:  None.  RTC 3-4 weeks if still painful.          Again, thank you for allowing me to participate in the care of your patient.        Sincerely,        Dale Lester MD

## 2018-03-22 NOTE — PROGRESS NOTES
Follow-up right knee arthroscopy with 30% partial medial meniscectomy, plica excision on 3/16/18.   Wounds are healing well.    No warmth or erythema.  He has moderate effusion.   There is severe pain.    He has significant quadriceps weakness.  Very limited range of motion:  degrees.    Assessment:  Right knee arthroscopy with partial medial meniscectomy with pain.  I reviewed the operative findings.  Will start range of motion and strengthening.  Scar massage.  Advance activity as tolerated.  Pain medication refill:  None.  RTC 3-4 weeks if still painful.

## 2018-03-22 NOTE — NURSING NOTE
"Chief Complaint   Patient presents with     Surgical Followup     Follow-up for right knee arthroscopy 3/16/18.       Initial Resp 13  Ht 1.676 m (5' 6\")  Wt 81.6 kg (180 lb)  BMI 29.05 kg/m2 Estimated body mass index is 29.05 kg/(m^2) as calculated from the following:    Height as of this encounter: 1.676 m (5' 6\").    Weight as of this encounter: 81.6 kg (180 lb).  Medication Reconciliation: complete    "

## 2018-03-22 NOTE — PATIENT INSTRUCTIONS
After arthroscopy we want to get range of motion and strength back.  Riding a bicycle at low resistance is helpful for motion and strength.  Tubigrip or ace wrap will help decrease swelling.  .   Standing hamstring stretch: Put the heel of the leg on your injured side on a stool about 15 inches high. Keep your leg straight. Lean forward, bending at the hips, until you feel a mild stretch in the back of your thigh. Make sure you don't roll your shoulders or bend at the waist when doing this or you will stretch your lower back instead of your leg. Hold the stretch for 15 to 30 seconds. Repeat 3 times.   Quadriceps stretch: Stand an arm's length away from the wall with your injured leg farthest from the wall. Facing straight ahead, brace yourself by keeping one hand against the wall. With your other hand, grasp the ankle of your injured leg and pull your heel toward your buttocks. Don't arch or twist your back. Keep your knees together. Hold this stretch for 15 to 30 seconds.   Side-lying leg lift: Lie on your uninjured side. Tighten the front thigh muscles on your injured leg and lift that leg 8 to 10 inches away from the other leg. Keep the leg straight and lower it slowly. Do 3 sets of 10.   Quad sets: Sit on the floor with your injured leg straight and your other leg bent. Press the back of the knee of your injured leg against the floor by tightening the muscles on the top of your thigh. Hold this position 10 seconds. Relax. Do 3 sets of 10.   Straight leg raise: Lie on your back with your legs straight out in front of you. Bend the knee on your uninjured side and place the foot flat on the floor. Tighten the thigh muscle on your injured side and lift your leg about 8 inches off the floor. Keep your leg straight and your thigh muscle tight. Slowly lower your leg back down to the floor. Do 3 sets of 10.   Step-up: Stand with the foot of your injured leg on a support 3 to 5 inches high (like a small step or block of  wood). Keep your other foot flat on the floor. Shift your weight onto the injured leg on the support. Straighten your injured leg as the other leg comes off the floor. Return to the starting position by bending your injured leg and slowly lowering your uninjured leg back to the floor. Do 3 sets of 10.   Wall squat with a ball: Stand with your back, shoulders, and head against a wall. Look straight ahead. Keep your shoulders relaxed and your feet 3 feet from the wall and shoulder's width apart. Place a soccer or basketball-sized ball behind your back. Keeping your back against the wall, slowly squat down to a 45-degree angle. Your thighs will not yet be parallel to the floor. Hold this position for 10 seconds and then slowly slide back up the wall. Repeat 10 times. Build up to 3 sets of 10.   Knee stabilization: Wrap a piece of elastic tubing around the ankle of the uninjured leg. Tie a knot in the other end of the tubing and close it in a door.   0. Stand facing the door on the leg without tubing and bend your knee slightly, keeping your thigh muscles tight. While maintaining this position, move the leg with the tubing straight back behind you. Do 3 sets of 10.   0. Turn 90 degrees so the leg without tubing is closest to the door. Move the leg with tubing away from your body. Do 3 sets of 10.   0. Turn 90 degrees again so your back is to the door. Move the leg with tubing straight out in front of you. Do 3 sets of 10.   0. Turn your body 90 degrees again so the leg with tubing is closest to the door. Move the leg with tubing across your body. Do 3 sets of 10.   Hold onto a chair if you need help balancing. This exercise can be made even more challenging by standing on a pillow while you move the leg with tubing.   Resisted terminal knee extension: Make a loop from a piece of elastic tubing by tying a knot in both ends. Close both knots in a door. Step into the loop so the tubing is around the back of your injured  leg. Lift the other foot off the ground. Hold onto a chair for balance, if needed. Bend the knee on the leg with tubing about 45 degrees. Slowly straighten your leg, keeping your thigh muscle tight as you do this. Do this 10 times. Do 3 sets. An easier way to do this is to stand on both legs for better support while you do the exercise.   Standing calf stretch: Stand facing a wall with your hands on the wall at about eye level. Keep your injured leg back with your heel on the floor. Keep the other leg forward with the knee bent. Turn your back foot slightly inward (as if you were pigeon-toed). Slowly lean into the wall until you feel a stretch in the back of your calf. Hold the stretch for 15 to 30 seconds. Return to the starting position. Repeat 3 times. Do this exercise several times each day.   Clam exercise: Lie on your uninjured side with your hips and knees bent and feet together. Slowly raise your top leg toward the ceiling while keeping your heels touching each other. Hold for 2 seconds and lower slowly. Do 3 sets of 10 repetitions.   Iliotibial band stretch: Side-bending: Cross one leg in front of the other leg and lean in the opposite direction from the front leg. Reach the arm on the side of the back leg over your head while you do this. Hold this position for 15 to 30 seconds. Return to the starting position. Repeat 3 times and then switch legs and repeat the exercise.   Published by [employer].  This content is reviewed periodically and is subject to change as new health information becomes available. The information is intended to inform and educate and is not a replacement for medical evaluation, advice, diagnosis or treatment by a healthcare professional.   Written by Nichole Mcconnell, MS, PT, and Beatriz Wasserman, PT, [facility], [facility], for [employer].   ? 2010 [facility] and/or its affiliates. All Rights Reserved.   Copyright   [employer] 2011  Adult Health Advisor

## 2018-03-22 NOTE — MR AVS SNAPSHOT
After Visit Summary   3/22/2018    Thanh Loaiza    MRN: [id number]           Patient Information     Date Of Birth          1965        Visit Information        Provider Department      3/22/2018 1:45 PM Dale Lester MD [facility] Instructions    After arthroscopy we want to get range of motion and strength back.  Riding a bicycle at low resistance is helpful for motion and strength.  Tubigrip or ace wrap will help decrease swelling.  .   Standing hamstring stretch: Put the heel of the leg on your injured side on a stool about 15 inches high. Keep your leg straight. Lean forward, bending at the hips, until you feel a mild stretch in the back of your thigh. Make sure you don't roll your shoulders or bend at the waist when doing this or you will stretch your lower back instead of your leg. Hold the stretch for 15 to 30 seconds. Repeat 3 times.   Quadriceps stretch: Stand an arm's length away from the wall with your injured leg farthest from the wall. Facing straight ahead, brace yourself by keeping one hand against the wall. With your other hand, grasp the ankle of your injured leg and pull your heel toward your buttocks. Don't arch or twist your back. Keep your knees together. Hold this stretch for 15 to 30 seconds.   Side-lying leg lift: Lie on your uninjured side. Tighten the front thigh muscles on your injured leg and lift that leg 8 to 10 inches away from the other leg. Keep the leg straight and lower it slowly. Do 3 sets of 10.   Quad sets: Sit on the floor with your injured leg straight and your other leg bent. Press the back of the knee of your injured leg against the floor by tightening the muscles on the top of your thigh. Hold this position 10 seconds. Relax. Do 3 sets of 10.   Straight leg raise: Lie on your back with your legs straight out in front of you. Bend the knee on your uninjured side and place the foot flat on the floor. Tighten the thigh  muscle on your injured side and lift your leg about 8 inches off the floor. Keep your leg straight and your thigh muscle tight. Slowly lower your leg back down to the floor. Do 3 sets of 10.   Step-up: Stand with the foot of your injured leg on a support 3 to 5 inches high (like a small step or block of wood). Keep your other foot flat on the floor. Shift your weight onto the injured leg on the support. Straighten your injured leg as the other leg comes off the floor. Return to the starting position by bending your injured leg and slowly lowering your uninjured leg back to the floor. Do 3 sets of 10.   Wall squat with a ball: Stand with your back, shoulders, and head against a wall. Look straight ahead. Keep your shoulders relaxed and your feet 3 feet from the wall and shoulder's width apart. Place a soccer or basketball-sized ball behind your back. Keeping your back against the wall, slowly squat down to a 45-degree angle. Your thighs will not yet be parallel to the floor. Hold this position for 10 seconds and then slowly slide back up the wall. Repeat 10 times. Build up to 3 sets of 10.   Knee stabilization: Wrap a piece of elastic tubing around the ankle of the uninjured leg. Tie a knot in the other end of the tubing and close it in a door.   0. Stand facing the door on the leg without tubing and bend your knee slightly, keeping your thigh muscles tight. While maintaining this position, move the leg with the tubing straight back behind you. Do 3 sets of 10.   0. Turn 90 degrees so the leg without tubing is closest to the door. Move the leg with tubing away from your body. Do 3 sets of 10.   0. Turn 90 degrees again so your back is to the door. Move the leg with tubing straight out in front of you. Do 3 sets of 10.   0. Turn your body 90 degrees again so the leg with tubing is closest to the door. Move the leg with tubing across your body. Do 3 sets of 10.   Hold onto a chair if you need help balancing. This  exercise can be made even more challenging by standing on a pillow while you move the leg with tubing.   Resisted terminal knee extension: Make a loop from a piece of elastic tubing by tying a knot in both ends. Close both knots in a door. Step into the loop so the tubing is around the back of your injured leg. Lift the other foot off the ground. Hold onto a chair for balance, if needed. Bend the knee on the leg with tubing about 45 degrees. Slowly straighten your leg, keeping your thigh muscle tight as you do this. Do this 10 times. Do 3 sets. An easier way to do this is to stand on both legs for better support while you do the exercise.   Standing calf stretch: Stand facing a wall with your hands on the wall at about eye level. Keep your injured leg back with your heel on the floor. Keep the other leg forward with the knee bent. Turn your back foot slightly inward (as if you were pigeon-toed). Slowly lean into the wall until you feel a stretch in the back of your calf. Hold the stretch for 15 to 30 seconds. Return to the starting position. Repeat 3 times. Do this exercise several times each day.   Clam exercise: Lie on your uninjured side with your hips and knees bent and feet together. Slowly raise your top leg toward the ceiling while keeping your heels touching each other. Hold for 2 seconds and lower slowly. Do 3 sets of 10 repetitions.   Iliotibial band stretch: Side-bending: Cross one leg in front of the other leg and lean in the opposite direction from the front leg. Reach the arm on the side of the back leg over your head while you do this. Hold this position for 15 to 30 seconds. Return to the starting position. Repeat 3 times and then switch legs and repeat the exercise.   Published by [employer]  This content is reviewed periodically and is subject to change as new health information becomes available. The information is intended to inform and educate and is not a replacement for medical evaluation,  advice, diagnosis or treatment by a healthcare professional.   Written by Nichole Mcconnell, MS, PT, and Beatriz Wasserman, PT, [facility], OCS, for [facility].   ? 2010 [facility] and/or its affiliates. All Rights Reserved.   Copyright   [employer] Reference Systems 2011  Adult Health Advisor                               Follow-ups after your visit        Your next 10 appointments already scheduled     Mar 22, 2018  1:45 PM CDT   Return Visit with Dale Lester MD   [facility] ([facility])    [address] [address] [phone]              Who to contact     If you have questions or need follow up information about today's clinic visit or your schedule please contact [facility] directly at [phone].  Normal or non-critical lab and imaging results will be communicated to you by MyChart, letter or phone within 4 business days after the clinic has received the results. If you do not hear from us within 7 days, please contact the clinic through MyChart or phone. If you have a critical or abnormal lab result, we will notify you by phone as soon as possible.  Submit refill requests through "[employer]" or call your pharmacy and they will forward the refill request to us. Please allow 3 business days for your refill to be completed.          Additional Information About Your Visit        Tueborahart Information     "[employer]" gives you secure access to your electronic health record. If you see a primary care provider, you can also send messages to your care team and make appointments. If you have questions, please call your primary care clinic.  If you do not have a primary care provider, please call [phone] and they will assist you.        Care EveryWhere ID     This is your Care EveryWhere ID. This could be used by other organizations to access your [facility] medical records  [id number]        Your Vitals Were     Respirations Height BMI (Body Mass Index)        "      13 1.676 m (5' 6\") 29.05 kg/m2          Blood Pressure from Last 3 Encounters:   03/16/18 117/71   03/14/18 109/71   03/04/18 112/78    Weight from Last 3 Encounters:   03/22/18 81.6 kg (180 lb)   03/14/18 81.1 kg (178 lb 12.8 oz)   03/13/18 82.6 kg (182 lb)              Today, you had the following     No orders found for display         Today's Medication Changes          These changes are accurate as of 3/22/18  1:35 PM.  If you have any questions, ask your nurse or doctor.               These medicines have changed or have updated prescriptions.        Dose/Directions    omeprazole 20 MG CR capsule   Commonly known as:  priLOSEC   This may have changed:    - when to take this  - reasons to take this   Used for:  Gastroesophageal reflux disease without esophagitis        Dose:  20 mg   Take 1 capsule (20 mg) by mouth daily   Quantity:  90 capsule   Refills:  3                Primary Care Provider Office Phone # Fax #    Saravanan Bocanegra [phone]       [address]  [place] [address]        Equal Access to Services     [facility] AH: Hadii edi lópez Sokatty, waaxda luyuan, qaybta kaalmada margret, corwin ferro . So [facility] [phone].    ATENCIÓN: Si habla español, tiene a powers disposición servicios gratuitos de asistencia lingüística. [facility] [phone].    We comply with applicable federal civil rights laws and [place] laws. We do not discriminate on the basis of race, color, national origin, age, disability, sex, sexual orientation, or gender identity.            Thank you!     Thank you for choosing [facility]  for your care. Our goal is always to provide you with excellent care. Hearing back from our patients is one way we can continue to improve our services. Please take a few minutes to complete the written survey that you may receive in the mail after your visit with us. Thank you!             Your Updated Medication List - " Protect others around you: Learn how to safely use, store and throw away your medicines at www.disposemymeds.org.          This list is accurate as of 3/22/18  1:35 PM.  Always use your most recent med list.                   Brand Name Dispense Instructions for use Diagnosis    aspirin 81 MG tablet     90 tablet    Take 81 mg by mouth as needed        atenolol-chlorthalidone 50-25 MG per tablet    TENORETIC    30 tablet    TAKE ONE TABLET BY MOUTH ONE TIME DAILY    Essential hypertension with goal blood pressure less than 140/90       HYDROcodone-acetaminophen 5-325 MG per tablet    NORCO    24 tablet    Take 1-2 tablets by mouth every 4 hours as needed for other (Moderate to Severe Pain)    Complex tear of medial meniscus of right knee as current injury, subsequent encounter, Chondromalacia of right knee       naproxen 500 MG tablet    NAPROSYN    30 tablet    Take 1 tablet (500 mg) by mouth 2 times daily as needed for moderate pain    Effusion of bursa of right knee       omeprazole 20 MG CR capsule    priLOSEC    90 capsule    Take 1 capsule (20 mg) by mouth daily    Gastroesophageal reflux disease without esophagitis       probiotic Caps     30 capsule    VSL Probiotic- Take 2 caps daily while on antibiotics    H. pylori infection

## 2018-03-29 ENCOUNTER — THERAPY VISIT (OUTPATIENT)
Dept: PHYSICAL THERAPY | Facility: CLINIC | Age: 53
End: 2018-03-29
Payer: COMMERCIAL

## 2018-03-29 DIAGNOSIS — Z98.890 S/P ARTHROSCOPIC PARTIAL MEDIAL MENISCECTOMY: ICD-10-CM

## 2018-03-29 DIAGNOSIS — M25.561 RIGHT KNEE PAIN: Primary | ICD-10-CM

## 2018-03-29 PROCEDURE — 97162 PT EVAL MOD COMPLEX 30 MIN: CPT | Mod: GP | Performed by: PHYSICAL THERAPIST

## 2018-03-29 PROCEDURE — 97110 THERAPEUTIC EXERCISES: CPT | Mod: GP | Performed by: PHYSICAL THERAPIST

## 2018-03-29 ASSESSMENT — ACTIVITIES OF DAILY LIVING (ADL)
WEAKNESS: THE SYMPTOM AFFECTS MY ACTIVITY MODERATELY
AS_A_RESULT_OF_YOUR_KNEE_INJURY,_HOW_WOULD_YOU_RATE_YOUR_CURRENT_LEVEL_OF_DAILY_ACTIVITY?: ABNORMAL
KNEE_ACTIVITY_OF_DAILY_LIVING_SUM: 24
SQUAT: ACTIVITY IS VERY DIFFICULT
WALK: ACTIVITY IS FAIRLY DIFFICULT
RAW_SCORE: 24
STIFFNESS: THE SYMPTOM AFFECTS MY ACTIVITY MODERATELY
STAND: ACTIVITY IS FAIRLY DIFFICULT
HOW_WOULD_YOU_RATE_THE_CURRENT_FUNCTION_OF_YOUR_KNEE_DURING_YOUR_USUAL_DAILY_ACTIVITIES_ON_A_SCALE_FROM_0_TO_100_WITH_100_BEING_YOUR_LEVEL_OF_KNEE_FUNCTION_PRIOR_TO_YOUR_INJURY_AND_0_BEING_THE_INABILITY_TO_PERFORM_ANY_OF_YOUR_USUAL_DAILY_ACTIVITIES?: 20
SIT WITH YOUR KNEE BENT: ACTIVITY IS FAIRLY DIFFICULT
GIVING WAY, BUCKLING OR SHIFTING OF KNEE: THE SYMPTOM AFFECTS MY ACTIVITY MODERATELY
KNEEL ON THE FRONT OF YOUR KNEE: ACTIVITY IS VERY DIFFICULT
HOW_WOULD_YOU_RATE_THE_OVERALL_FUNCTION_OF_YOUR_KNEE_DURING_YOUR_USUAL_DAILY_ACTIVITIES?: ABNORMAL
PAIN: THE SYMPTOM AFFECTS MY ACTIVITY MODERATELY
SWELLING: THE SYMPTOM AFFECTS MY ACTIVITY MODERATELY
KNEE_ACTIVITY_OF_DAILY_LIVING_SCORE: 34.29
GO UP STAIRS: ACTIVITY IS VERY DIFFICULT
LIMPING: THE SYMPTOM AFFECTS MY ACTIVITY MODERATELY
RISE FROM A CHAIR: ACTIVITY IS FAIRLY DIFFICULT
GO DOWN STAIRS: ACTIVITY IS VERY DIFFICULT

## 2018-03-29 NOTE — PROGRESS NOTES
[facility] Initial Evaluation  Subjective:  Patient is a 52 year old male presenting with rehab right knee hpi. The history is provided by the patient. No  was used.   Thanh Laoiza is a 52 year old male with a right knee condition.  Condition occurred with:  Insidious onset.  Condition occurred: for unknown reasons.  This is a new condition  Patient reports that he had right knee arthroscopic partial medial menisectomy on 3-16-18. He had been having knee pain on and off for awhile before that with no known injury. He is still using his crutches because of a lot of weakness. He states he can't lift his leg on his own. .    Patient reports pain:  Anterior, medial and lateral.    Pain is described as aching, shooting and sharp and is intermittent and reported as 5/10.  Associated symptoms:  Loss of strength, loss of motion/stiffness and edema. Pain is worse during the night.  Symptoms are exacerbated by descending stairs, ascending stairs, bending/squatting, weight bearing, standing, walking and transfers (walk erum 10' with crutches, stairs one at a time) and relieved by rest.  Since onset symptoms are unchanged.        General health as reported by patient is excellent.  Pertinent medical history includes:  High blood pressure.    Other surgeries include:  Orthopedic surgery (lumbar fusion 2017).  Current medications:  High blood pressure medication and other (ateterol, chlorithize).  Current occupation is insurance.  Patient is working in normal job without restrictions.  Primary job tasks include:  Prolonged sitting.    Barriers include:  None as reported by the patient.    Red flags:  None as reported by the patient.                        Ljzldvdw4g:    Gait:  Patient ambulates mainly NWB on the R  Gait Type:  Antalgic   Weight Bearing Status:  WBAT   Assistive Devices:  Crutches  Deviations:  Hip:  Decr dynamic control RKnee:  Knee extension decr R                                                       Knee Evaluation:  ROM:    AROM    Hyperextension: Left:     Right: 0  Extension: Left:    Right:  40  Flexion: Left:   Right: 95  PROM    Hyperextension: Left:   Right:  0  Extension: Left:   Right:  30  Flexion: Left:   Right:  100      Strength:     Extension:  Right: 2/5   Pain:  Flexion:  Right: 4-/5   Pain:      Quad Set Right: Poor    Pain:  Ligament Testing:  Not Assessed                Special Tests: Not Assessed      Palpation:      Right knee tenderness present at:  Medial Joint Line and Incisional  Edema:  Edema of the knee: moderate througtout R knee.            General     ROS    Assessment/Plan:    Patient is a 52 year old male with right side knee complaints.    Patient has the following significant findings with corresponding treatment plan.                Diagnosis 1:  S/p R knee partial menisectomy  Pain -  hot/cold therapy, manual therapy, self management, education, directional preference exercise and home program  Decreased ROM/flexibility - manual therapy, therapeutic exercise, therapeutic activity and home program  Decreased strength - therapeutic exercise, therapeutic activities and home program  Impaired balance - neuro re-education, gait training, therapeutic activities, adaptive equipment/assistive device and home program  Decreased proprioception - neuro re-education, therapeutic activities and home program  Edema - cold therapy and self management/home program  Impaired gait - gait training and home program  Impaired muscle performance - neuro re-education and home program  Decreased function - therapeutic activities and home program    Therapy Evaluation Codes:   1) History comprised of:   Personal factors that impact the plan of care:      None.    Comorbidity factors that impact the plan of care are:      Weakness.     Medications impacting care: None.  2) Examination of Body Systems comprised of:   Body structures and functions that impact the plan of  care:      Knee.   Activity limitations that impact the plan of care are:      Driving, Dressing, Jumping, Lifting, Running, Squatting/kneeling, Stairs, Standing and Walking.  3) Clinical presentation characteristics are:   Evolving/Changing.  4) Decision-Making    Moderate complexity using standardized patient assessment instrument and/or measureable assessment of functional outcome.  Cumulative Therapy Evaluation is: Moderate complexity.    Previous and current functional limitations:  (See Goal Flow Sheet for this information)    Short term and Long term goals: (See Goal Flow Sheet for this information)     Communication ability:  Patient appears to be able to clearly communicate and understand verbal and written communication and follow directions correctly.  Treatment Explanation - The following has been discussed with the patient:   RX ordered/plan of care  Anticipated outcomes  Possible risks and side effects  This patient would benefit from PT intervention to resume normal activities.   Rehab potential is good.    Frequency:  2 X week, once daily  Duration:  for 4 weeks tapering to 1 X a week over 4 weeks  Discharge Plan:  Achieve all LTG.  Independent in home treatment program.  Reach maximal therapeutic benefit.    Please refer to the daily flowsheet for treatment today, total treatment time and time spent performing 1:1 timed codes.

## 2018-03-29 NOTE — MR AVS SNAPSHOT
After Visit Summary   3/29/2018    Thanh Loaiza    MRN: [id number]           Patient Information     Date Of Birth          1965        Visit Information        Provider Department      3/29/2018 12:20 PM Suzi White, PT [facility]        Today's Diagnoses     Right knee pain    -  1    S/P arthroscopic partial medial meniscectomy           Follow-ups after your visit        Your next 10 appointments already scheduled     Apr 05, 2018  4:50 PM CDT   SYDNEE Extremity with Suzi White, PT   [facility]    [address]  [facility] [address]   [phone]            Apr 09, 2018  9:00 AM CDT   SYDNEE Extremity with Suzi White PT   [facility]    [address]  [facility] [address]   [phone]            Apr 12, 2018  8:20 AM CDT   SYDNEE Extremity with Suzi White PT   [facility] ([facility]  )    [address]  [facility] [address]   [phone]            Apr 16, 2018  9:00 AM CDT   SYDNEE Extremity with Suzi White PT   [facility] ([facility]  )    [address]  [facility] [address]   [phone]            Apr 19, 2018  8:20 AM CDT   SYDNEE Extremity with Suzi White PT   [facility] ([facility]  )    [address]  [facility] [address]   [phone]            Apr 23, 2018  9:00 AM CDT   SYDNEE Extremity with Suzi White PT   [facility] ([facility]  )    [address]  [facility] [address]   [phone]            Apr 26, 2018  8:20 AM CDT   SYDNEE Extremity with Suzi White PT   [facility] ([facility]  )    [address]  [place] [address]    [phone]            Apr 30, 2018  9:00 AM CDT   SYDNEE Extremity with Suzi White, PT   [facility] (SY[facility]EE [facility]  )    [address]  [place] [address] [phone]            May 03, 2018  9:00 AM CDT   SYDNEE Extremity with Suzi White, PT   [facility] ([facility] [place]  )    [address]  [place] [address] [phone]              Who to contact     If you have questions or need follow up information about today's clinic visit or your schedule please contact [facility] directly at [phone].  Normal or non-critical lab and imaging results will be communicated to you by Sonnedixhart, letter or phone within 4 business days after the clinic has received the results. If you do not hear from us within 7 days, please contact the clinic through [employer] or phone. If you have a critical or abnormal lab result, we will notify you by phone as soon as possible.  Submit refill requests through [employer] or call your pharmacy and they will forward the refill request to us. Please allow 3 business days for your refill to be completed.          Additional Information About Your Visit        [employer] Information     [employer] gives you secure access to your electronic health record. If you see a primary care provider, you can also send messages to your care team and make appointments. If you have questions, please call your primary care clinic.  If you do not have a primary care provider, please call [phone] and they will assist you.        Care EveryWhere ID     This is your Care EveryWhere ID. This could be used by other organizations to access your [facility] medical records  [id number]         Blood Pressure from Last 3 Encounters:   03/16/18 117/71   03/14/18 109/71   03/04/18 112/78    Weight from Last 3 Encounters:   03/22/18 81.6 kg (180 lb)   03/14/18 81.1 kg (178 lb 12.8  oz)   03/13/18 82.6 kg (182 lb)              We Performed the Following     SYDNEE Inital Eval Report     PT Eval, Moderate Complexity (08936)     Therapeutic Exercises          Today's Medication Changes          These changes are accurate as of 3/29/18 11:59 PM.  If you have any questions, ask your nurse or doctor.               These medicines have changed or have updated prescriptions.        Dose/Directions    omeprazole 20 MG CR capsule   Commonly known as:  priLOSEC   This may have changed:    - when to take this  - reasons to take this   Used for:  Gastroesophageal reflux disease without esophagitis        Dose:  20 mg   Take 1 capsule (20 mg) by mouth daily   Quantity:  90 capsule   Refills:  3                Primary Care Provider Office Phone # Fax #    Saravanan Bocanegra [phone]       [address]  [facility] [address]        Equal Access to Services     [facility] : Hadii aad ku hadasho Sokatty, waaxda luqadaha, qaybta kaalmada adekeenanyakeyla, corwin ferro . So [facility] [phone].    ATENCIÓN: Si habla español, tiene a powers disposición servicios gratuitos de asistencia lingüística. [facility] [phone].    We comply with applicable federal civil rights laws and [place] laws. We do not discriminate on the basis of race, color, national origin, age, disability, sex, sexual orientation, or gender identity.            Thank you!     Thank you for choosing [facility]  for your care. Our goal is always to provide you with excellent care. Hearing back from our patients is one way we can continue to improve our services. Please take a few minutes to complete the written survey that you may receive in the mail after your visit with us. Thank you!             Your Updated Medication List - Protect others around you: Learn how to safely use, store and throw away your medicines at www.disposemymeds.org.          This list is accurate as of  3/29/18 11:59 PM.  Always use your most recent med list.                   Brand Name Dispense Instructions for use Diagnosis    aspirin 81 MG tablet     90 tablet    Take 81 mg by mouth as needed        atenolol-chlorthalidone 50-25 MG per tablet    TENORETIC    30 tablet    TAKE ONE TABLET BY MOUTH ONE TIME DAILY    Essential hypertension with goal blood pressure less than 140/90       HYDROcodone-acetaminophen 5-325 MG per tablet    NORCO    24 tablet    Take 1-2 tablets by mouth every 4 hours as needed for other (Moderate to Severe Pain)    Complex tear of medial meniscus of right knee as current injury, subsequent encounter, Chondromalacia of right knee       naproxen 500 MG tablet    NAPROSYN    30 tablet    Take 1 tablet (500 mg) by mouth 2 times daily as needed for moderate pain    Effusion of bursa of right knee       omeprazole 20 MG CR capsule    priLOSEC    90 capsule    Take 1 capsule (20 mg) by mouth daily    Gastroesophageal reflux disease without esophagitis       probiotic Caps     30 capsule    VSL Probiotic- Take 2 caps daily while on antibiotics    H. pylori infection

## 2018-03-30 PROBLEM — Z98.890 S/P ARTHROSCOPIC PARTIAL MEDIAL MENISCECTOMY: Status: ACTIVE | Noted: 2018-03-30

## 2018-03-30 PROBLEM — M25.561 RIGHT KNEE PAIN: Status: ACTIVE | Noted: 2018-03-30

## 2018-04-10 ENCOUNTER — OFFICE VISIT (OUTPATIENT)
Dept: ORTHOPEDICS | Facility: CLINIC | Age: 53
End: 2018-04-10
Payer: COMMERCIAL

## 2018-04-10 VITALS
DIASTOLIC BLOOD PRESSURE: 79 MMHG | TEMPERATURE: 98.1 F | WEIGHT: 180 LBS | HEIGHT: 66 IN | SYSTOLIC BLOOD PRESSURE: 128 MMHG | RESPIRATION RATE: 18 BRPM | BODY MASS INDEX: 28.93 KG/M2

## 2018-04-10 DIAGNOSIS — Z98.890 STATUS POST ARTHROSCOPY OF RIGHT KNEE: ICD-10-CM

## 2018-04-10 DIAGNOSIS — M25.561 RIGHT KNEE PAIN, UNSPECIFIED CHRONICITY: Primary | ICD-10-CM

## 2018-04-10 PROCEDURE — 20610 DRAIN/INJ JOINT/BURSA W/O US: CPT | Mod: 78 | Performed by: ORTHOPAEDIC SURGERY

## 2018-04-10 PROCEDURE — 99024 POSTOP FOLLOW-UP VISIT: CPT | Performed by: ORTHOPAEDIC SURGERY

## 2018-04-10 RX ORDER — METHYLPREDNISOLONE ACETATE 80 MG/ML
80 INJECTION, SUSPENSION INTRA-ARTICULAR; INTRALESIONAL; INTRAMUSCULAR; SOFT TISSUE ONCE
Qty: 1 ML | Refills: 0 | OUTPATIENT
Start: 2018-04-10 | End: 2018-04-10

## 2018-04-10 NOTE — PROGRESS NOTES
Risks, benefits, potential complications and alternatives were discussed.   With the patient's consent, sterile prep was performed of right knee(s).  Right knee was injected with Depo Medrol 80 mg and lidocaine at anterolateral site.    Eliecer Wallace PA-C  Supervising physician: Dale Lester MD  Dept. of Orthopedics  [facility]

## 2018-04-10 NOTE — LETTER
4/10/2018         RE: Thanh Loaiza  [address]  [place] [address]        Dear Colleague,    Thank you for referring your patient, Thanh Loaiza, to the [facility] [place]. Please see a copy of my visit note below.    The patient's right knee was prepped with betadine solution after verification of allergies. Area approximately 10 cm x 10 cm prepped in a sterile fashion. After injection, betadine removed with soap and water and band-aids applied.    1ml depo-medrol with 1% lidocaine plain injected into patient's right knee by Eliecer Wallace PA-C  LOT# [id number]  Exp. 05/2020    Eliecer Wallace PA-C  Supervising physician: Dale Lester MD  Dept. of Orthopedics  [facility]          Risks, benefits, potential complications and alternatives were discussed.   With the patient's consent, sterile prep was performed of right knee(s).  Right knee was injected with Depo Medrol 80 mg and lidocaine at anterolateral site.    Eliecer Wallace PA-C  Supervising physician: Dale Lester MD  Dept. of Orthopedics  [facility]            Follow-up right knee arthroscopy with 30% partial medial meniscectomy, plica excision on 3/16/18. He has significant pain in knee.  He had significant swelling and has very limited range of motion.  Wounds are healing well.    No warmth or erythema.  He has moderate effusion.   There is severe pain.    He has significant quadriceps weakness.  Very limited range of motion: 60-90 degrees.    Assessment:  Right knee arthroscopy with partial medial meniscectomy with pain.  I think it is pain that is stopping his range of motion, but cannot rule out a new displaced bucket-handle tear.  I recommend  injection today of right knee(s).  Risks, benefits, potential complications and alternatives were discussed.   With the patient's consent, sterile prep was performed of right knee(s).  Right knee was injected with Depo Medrol 80 mg and  lidocaine at anterolateral site.  He still allowed only 30 degrees extension, but then limited extension to 50 degrees, so it appears it is voluntary guarding.  We will continue Physical Therapy for range of motion.  Return to clinic 2 weeks.  If he does not make progress, we may repeat MRI          Again, thank you for allowing me to participate in the care of your patient.        Sincerely,        Dale Lester MD

## 2018-04-10 NOTE — PROGRESS NOTES
The patient's right knee was prepped with betadine solution after verification of allergies. Area approximately 10 cm x 10 cm prepped in a sterile fashion. After injection, betadine removed with soap and water and band-aids applied.    1ml depo-medrol with 1% lidocaine plain injected into patient's right knee by Eliecer Wallace PA-C  LOT# [id number]  Exp. 05/2020    Eliecer Wallace PA-C  Supervising physician: Dale Lester MD  Dept. of Orthopedics  [facility]

## 2018-04-10 NOTE — PROGRESS NOTES
Follow-up right knee arthroscopy with 30% partial medial meniscectomy, plica excision on 3/16/18. He has significant pain in knee.  He had significant swelling and has very limited range of motion.  Wounds are healing well.    No warmth or erythema.  He has moderate effusion.   There is severe pain.    He has significant quadriceps weakness.  Very limited range of motion: 60-90 degrees.    Assessment:  Right knee arthroscopy with partial medial meniscectomy with pain.  I think it is pain that is stopping his range of motion, but cannot rule out a new displaced bucket-handle tear.  I recommend  injection today of right knee(s).  Risks, benefits, potential complications and alternatives were discussed.   With the patient's consent, sterile prep was performed of right knee(s).  Right knee was injected with Depo Medrol 80 mg and lidocaine at anterolateral site.  He still allowed only 30 degrees extension, but then limited extension to 50 degrees, so it appears it is voluntary guarding.  We will continue Physical Therapy for range of motion.  Return to clinic 2 weeks.  If he does not make progress, we may repeat MRI

## 2018-04-10 NOTE — MR AVS SNAPSHOT
After Visit Summary   4/10/2018    Thanh Loaiza    MRN: [id number]           Patient Information     Date Of Birth          1965        Visit Information        Provider Department      4/10/2018 8:15 AM Dale Lester MD [facility]        Today's Diagnoses     Right knee pain, unspecified chronicity    -  1    Status post arthroscopy of right knee          Care Instructions    You have had a steroid injection today.  For the first 2 hours there will likely be some numbing in the joint from the lidocaine.  This is a good sign, indicating that the injection is in the right place.  In 2 hours the lidocaine will wear off, and the joint will hurt like you had a shot.  Each day the cortisone makes it feel better.  It reaches peak effect in 2 weeks.  We expect it to last for 3 months.  You may resume regular activity when you feel ready.  If you are diabetic, your glucoses will be quite high for several days.            Follow-ups after your visit        Your next 10 appointments already scheduled     Apr 12, 2018  8:20 AM CDT   SYDNEE Extremity with Suzi White, PT   [place] [facility] ([facility]  )    [address] [facility] [address]   [phone]            Apr 16, 2018  9:00 AM CDT   SYDNEE Extremity with Suzi White, PT   [place] [facility] ([facility]  )    [address] [facility] [address]   [phone]            Apr 19, 2018  8:20 AM CDT   SYDNEE Extremity with Suzi White, PT   [facility] ([facility]  )    [address] [facility] [address]   [phone]            Apr 23, 2018  9:00 AM CDT   SYDNEE Extremity with Suzi White, PT   [facility] ([facility]  )    [address] [facility] [address]   [phone]            Apr 26, 2018  8:20 AM CDT   SYDNEE Extremity  "with Suzi White, PT   [facility] ([facility]  )    [address]  [facility] [address]   [phone]            Apr 30, 2018  9:00 AM CDT   SYDNEE Extremity with Suzi White, PT   [facility] ([facility]  )    [address]  [facility] [address]   [phone]            May 03, 2018  9:00 AM CDT   SYDNEE Extremity with Suzi White, PT   [facility] ([facility]  )    [address]  [facility] [address]   [phone]              Who to contact     If you have questions or need follow up information about today's clinic visit or your schedule please contact [facility] directly at [phone].  Normal or non-critical lab and imaging results will be communicated to you by [employer], letter or phone within 4 business days after the clinic has received the results. If you do not hear from us within 7 days, please contact the clinic through Skuidt or phone. If you have a critical or abnormal lab result, we will notify you by phone as soon as possible.  Submit refill requests through Programeter or call your pharmacy and they will forward the refill request to us. Please allow 3 business days for your refill to be completed.          Additional Information About Your Visit        Programeter Information     Programeter lets you send messages to your doctor, view your test results, renew your prescriptions, schedule appointments and more. To sign up, go to [email or web address] . Click on \"Log in\" on the left side of the screen, which will take you to the Welcome page. Then click on \"Sign up Now\" on the right side of the page.     You will be asked to enter the access code listed below, as well as some personal information. Please follow the directions to create your username and password.     Your access code is: [email or web address]  Expires: 7/9/2018  9:34 " "AM     Your access code will  in 90 days. If you need help or a new code, please call your [place] clinic or [phone].        Care EveryWhere ID     This is your Care EveryWhere ID. This could be used by other organizations to access your [place] medical records  [id number]        Your Vitals Were     Temperature Respirations Height BMI (Body Mass Index)          98.1  F (36.7  C) 18 1.676 m (5' 6\") 29.05 kg/m2         Blood Pressure from Last 3 Encounters:   04/10/18 128/79   18 117/71   18 109/71    Weight from Last 3 Encounters:   04/10/18 81.6 kg (180 lb)   18 81.6 kg (180 lb)   18 81.1 kg (178 lb 12.8 oz)              Today, you had the following     No orders found for display         Today's Medication Changes          These changes are accurate as of 4/10/18  9:34 AM.  If you have any questions, ask your nurse or doctor.               These medicines have changed or have updated prescriptions.        Dose/Directions    omeprazole 20 MG CR capsule   Commonly known as:  priLOSEC   This may have changed:    - when to take this  - reasons to take this   Used for:  Gastroesophageal reflux disease without esophagitis        Dose:  20 mg   Take 1 capsule (20 mg) by mouth daily   Quantity:  90 capsule   Refills:  3                Primary Care Provider Office Phone # Fax #    Saravanan Bocanegra [phone]       [address] [facility] [facility] [address]        Equal Access to Services     [facility]: Hadii aad ku hadasho Soomaali, waaxda luqadaha, qaybta kaalmada adeegyada, corwin ferro . So [facility] [phone].    ATENCIÓN: Si habla español, tiene a powers disposición servicios gratuitos de asistencia lingüística. Llame al [phone].    We comply with applicable federal civil rights laws and [place] laws. We do not discriminate on the basis of race, color, national origin, age, disability, sex, sexual orientation, or gender identity.          "   Thank you!     Thank you for choosing [facility]  for your care. Our goal is always to provide you with excellent care. Hearing back from our patients is one way we can continue to improve our services. Please take a few minutes to complete the written survey that you may receive in the mail after your visit with us. Thank you!             Your Updated Medication List - Protect others around you: Learn how to safely use, store and throw away your medicines at www.disposemymeds.org.          This list is accurate as of 4/10/18  9:34 AM.  Always use your most recent med list.                   Brand Name Dispense Instructions for use Diagnosis    aspirin 81 MG tablet     90 tablet    Take 81 mg by mouth as needed        atenolol-chlorthalidone 50-25 MG per tablet    TENORETIC    30 tablet    TAKE ONE TABLET BY MOUTH ONE TIME DAILY    Essential hypertension with goal blood pressure less than 140/90       HYDROcodone-acetaminophen 5-325 MG per tablet    NORCO    24 tablet    Take 1-2 tablets by mouth every 4 hours as needed for other (Moderate to Severe Pain)    Complex tear of medial meniscus of right knee as current injury, subsequent encounter, Chondromalacia of right knee       naproxen 500 MG tablet    NAPROSYN    30 tablet    Take 1 tablet (500 mg) by mouth 2 times daily as needed for moderate pain    Effusion of bursa of right knee       omeprazole 20 MG CR capsule    priLOSEC    90 capsule    Take 1 capsule (20 mg) by mouth daily    Gastroesophageal reflux disease without esophagitis       probiotic Caps     30 capsule    VSL Probiotic- Take 2 caps daily while on antibiotics    H. pylori infection

## 2018-04-12 ENCOUNTER — THERAPY VISIT (OUTPATIENT)
Dept: PHYSICAL THERAPY | Facility: CLINIC | Age: 53
End: 2018-04-12
Payer: COMMERCIAL

## 2018-04-12 DIAGNOSIS — M25.561 RIGHT KNEE PAIN: ICD-10-CM

## 2018-04-12 DIAGNOSIS — Z98.890 S/P ARTHROSCOPIC PARTIAL MEDIAL MENISCECTOMY: ICD-10-CM

## 2018-04-12 PROCEDURE — 97140 MANUAL THERAPY 1/> REGIONS: CPT | Mod: GP | Performed by: PHYSICAL THERAPIST

## 2018-04-12 PROCEDURE — 97110 THERAPEUTIC EXERCISES: CPT | Mod: GP | Performed by: PHYSICAL THERAPIST

## 2018-04-12 NOTE — MR AVS SNAPSHOT
After Visit Summary   4/12/2018    Thanh Loaiza    MRN: [id number]           Patient Information     Date Of Birth          1965        Visit Information        Provider Department      4/12/2018 8:20 AM Suzi White PT [facility]        Today's Diagnoses     Right knee pain        S/P arthroscopic partial medial meniscectomy           Follow-ups after your visit        Your next 10 appointments already scheduled     Apr 16, 2018  9:00 AM CDT   SYDNEE Extremity with Suzi White PT   [place] [facility] ([facility]  )    [address]  [facility] [address]   [phone]            Apr 19, 2018  8:20 AM CDT   SYDNEE Extremity with Suzi White PT   [place] [facility] ([facility]  )    [address]  [facility] [address]   [phone]            Apr 23, 2018  9:00 AM CDT   SYDNEE Extremity with Suzi White PT   [place] [facility] ([facility]  )    [address]  [facility] [address]   [phone]            Apr 24, 2018  9:15 AM CDT   Return Visit with Dale Lester MD   [facility]    [address] [facility] [address]   [phone]            Apr 26, 2018  8:20 AM CDT   SYDNEE Extremity with Suzi White PT   [facility] ([facility]  )    [address] [place]  [facility] [address]   [phone]            Apr 30, 2018  9:00 AM CDT   SYDNEE Extremity with Suzi White PT   [facility] ([facility]  )    [address]  [facility] [address]   [phone]            May 03, 2018  9:00 AM CDT   SYDNEE Extremity with SARAHY [facility] ([facility]  )    [address]  [facility] [address]  "  [phone]              Who to contact     If you have questions or need follow up information about today's clinic visit or your schedule please contact [facility] [place] directly at [phone].  Normal or non-critical lab and imaging results will be communicated to you by MyChart, letter or phone within 4 business days after the clinic has received the results. If you do not hear from us within 7 days, please contact the clinic through [employer] or phone. If you have a critical or abnormal lab result, we will notify you by phone as soon as possible.  Submit refill requests through rubberit or call your pharmacy and they will forward the refill request to us. Please allow 3 business days for your refill to be completed.          Additional Information About Your Visit        [employer] Information     rubberit lets you send messages to your doctor, view your test results, renew your prescriptions, schedule appointments and more. To sign up, go to [email or web address] . Click on \"Log in\" on the left side of the screen, which will take you to the Welcome page. Then click on \"Sign up Now\" on the right side of the page.     You will be asked to enter the access code listed below, as well as some personal information. Please follow the directions to create your username and password.     Your access code is: [email or web address]  Expires: 2018  9:34 AM     Your access code will  in 90 days. If you need help or a new code, please call your [place] clinic or [phone].        Care EveryWhere ID     This is your Care EveryWhere ID. This could be used by other organizations to access your [place] medical records  [id number]         Blood Pressure from Last 3 Encounters:   04/10/18 128/79   18 117/71   18 109/71    Weight from Last 3 Encounters:   04/10/18 81.6 kg (180 lb)   18 81.6 kg (180 lb)   18 81.1 kg (178 lb 12.8 oz)              We Performed the Following "     Manual Ther Tech, 1+Regions, EA 15 min     Therapeutic Exercises          Today's Medication Changes          These changes are accurate as of 4/12/18  9:57 AM.  If you have any questions, ask your nurse or doctor.               These medicines have changed or have updated prescriptions.        Dose/Directions    omeprazole 20 MG CR capsule   Commonly known as:  priLOSEC   This may have changed:    - when to take this  - reasons to take this   Used for:  Gastroesophageal reflux disease without esophagitis        Dose:  20 mg   Take 1 capsule (20 mg) by mouth daily   Quantity:  90 capsule   Refills:  3                Primary Care Provider Office Phone # Fax #    Saravanan Bocanegra [phone]       [address]  [facility] [address]        Equal Access to Services     [facility] : Hadii edi quinteroso Sokatty, waaxda luqadaha, qaybta kaalmada adeegyada, corwin rolon. So [facility] [phone].    ATENCIÓN: Si habla español, tiene a powers disposición servicios gratuitos de asistencia lingüística. Llame al [phone].    We comply with applicable federal civil rights laws and [place] laws. We do not discriminate on the basis of race, color, national origin, age, disability, sex, sexual orientation, or gender identity.            Thank you!     Thank you for choosing [facility]  for your care. Our goal is always to provide you with excellent care. Hearing back from our patients is one way we can continue to improve our services. Please take a few minutes to complete the written survey that you may receive in the mail after your visit with us. Thank you!             Your Updated Medication List - Protect others around you: Learn how to safely use, store and throw away your medicines at www.disposemymeds.org.          This list is accurate as of 4/12/18  9:57 AM.  Always use your most recent med list.                   Brand Name Dispense  Instructions for use Diagnosis    aspirin 81 MG tablet     90 tablet    Take 81 mg by mouth as needed        atenolol-chlorthalidone 50-25 MG per tablet    TENORETIC    30 tablet    TAKE ONE TABLET BY MOUTH ONE TIME DAILY    Essential hypertension with goal blood pressure less than 140/90       HYDROcodone-acetaminophen 5-325 MG per tablet    NORCO    24 tablet    Take 1-2 tablets by mouth every 4 hours as needed for other (Moderate to Severe Pain)    Complex tear of medial meniscus of right knee as current injury, subsequent encounter, Chondromalacia of right knee       naproxen 500 MG tablet    NAPROSYN    30 tablet    Take 1 tablet (500 mg) by mouth 2 times daily as needed for moderate pain    Effusion of bursa of right knee       omeprazole 20 MG CR capsule    priLOSEC    90 capsule    Take 1 capsule (20 mg) by mouth daily    Gastroesophageal reflux disease without esophagitis       probiotic Caps     30 capsule    VSL Probiotic- Take 2 caps daily while on antibiotics    H. pylori infection

## 2018-04-12 NOTE — PROGRESS NOTES
Subjective:  HPI                    Objective:  System    Physical Exam    General     ROS    Assessment/Plan:    SUBJECTIVE  Subjective changes as noted by pt:  Patient reports that he saw the surgeon on Tuesday of this week and had a cortisone injection which has not really changed his symptoms. He still has to walk with 2 crutches because of pain and inability of straightening the right knee.        Current Pain level: 0/10   Changes in function:  Yes (See Goal flowsheet attached for changes in current functional level)     Adverse reaction to treatment or activity:  None    OBJECTIVE  Changes in objective findings:   Supine R knee AAROM: 0-. Patient continuing ambulation with 2 crutches and sign limp due to lack of R knee extension on stance. Unable to do supine SLR without minimal assist.     ASSESSMENT  Thanh continues to require intervention to meet STG and LTG's: PT  Improved knee flexion, but no real change in extension.   Response to therapy has shown an improvement in  ROM ,  Response to therapy has shown lack of progress in  ROM , gait and function  Progress made towards STG/LTG?  Yes (See Goal flowsheet attached for updates on achievement of STG and LTG)    PLAN  Current treatment program is being advanced to more complex exercises.    PTA/ATC plan:  N/A    Please refer to the daily flowsheet for treatment today, total treatment time and time spent performing 1:1 timed codes.

## 2018-04-17 ENCOUNTER — MYC MEDICAL ADVICE (OUTPATIENT)
Dept: FAMILY MEDICINE | Facility: CLINIC | Age: 53
End: 2018-04-17

## 2018-04-17 ENCOUNTER — TRANSFERRED RECORDS (OUTPATIENT)
Dept: HEALTH INFORMATION MANAGEMENT | Facility: CLINIC | Age: 53
End: 2018-04-17

## 2018-04-19 ENCOUNTER — THERAPY VISIT (OUTPATIENT)
Dept: PHYSICAL THERAPY | Facility: CLINIC | Age: 53
End: 2018-04-19
Payer: COMMERCIAL

## 2018-04-19 DIAGNOSIS — M25.561 RIGHT KNEE PAIN: ICD-10-CM

## 2018-04-19 DIAGNOSIS — Z98.890 S/P ARTHROSCOPIC PARTIAL MEDIAL MENISCECTOMY: ICD-10-CM

## 2018-04-19 PROCEDURE — 97530 THERAPEUTIC ACTIVITIES: CPT | Mod: GP | Performed by: PHYSICAL THERAPIST

## 2018-04-19 PROCEDURE — 97110 THERAPEUTIC EXERCISES: CPT | Mod: GP | Performed by: PHYSICAL THERAPIST

## 2018-04-19 NOTE — PROGRESS NOTES
Subjective:  HPI                    Objective:  System    Physical Exam    General     ROS    Assessment/Plan:    SUBJECTIVE  Subjective changes as noted by pt:  Patient reports that the last 2 days his knee has felt more flexible and less painful. He feels like he is walking better.       Current Pain level: 0/10   Changes in function:  Yes (See Goal flowsheet attached for changes in current functional level)     Adverse reaction to treatment or activity:  None    OBJECTIVE  Changes in objective findings:   Supine R knee AAROM: 0-. Gait still antalgic, but better as R knee in more extension with stance phase today. He is able to do a supine SLR unassisted today, but still difficult with lack of full knee extension.     ASSESSMENT  Thanh continues to require intervention to meet STG and LTG's: PT  Patient's symptoms are resolving.  Response to therapy has shown an improvement in  pain level, ROM  and gait  Progress made towards STG/LTG?  Yes (See Goal flowsheet attached for updates on achievement of STG and LTG)    PLAN  Current treatment program is being advanced to more complex exercises.    PTA/ATC plan:  N/A    Please refer to the daily flowsheet for treatment today, total treatment time and time spent performing 1:1 timed codes.

## 2018-04-19 NOTE — MR AVS SNAPSHOT
After Visit Summary   4/19/2018    Thanh Loaiza    MRN: [id number]           Patient Information     Date Of Birth          1965        Visit Information        Provider Department      4/19/2018 8:20 AM Suzi White, SARAHY [facility]        Today's Diagnoses     Right knee pain        S/P arthroscopic partial medial meniscectomy           Follow-ups after your visit        Your next 10 appointments already scheduled     Apr 23, 2018  9:00 AM CDT   SYDNEE Extremity with Suzi White PT   [facility] (SYDNEE [place]  )    [address]  [place] [address]   [phone]            Apr 24, 2018  9:15 AM CDT   Return Visit with Dale Lester MD   [facility] ([facility])    [address] [facility] [address]   [phone]            Apr 26, 2018  8:20 AM CDT   SYDNEE Extremity with Suzi White PT   [facility] ([facility] [place]  )    [address]  [place] [address]   [phone]            Apr 30, 2018  9:00 AM CDT   SYDNEE Extremity with Suzi White PT   [facility] ([facility] [place]  )    [address]  [place] [address]   [phone]            May 03, 2018  9:00 AM CDT   SYDNEE Extremity with Suzi White PT   [facility] ([facility] [place]  )    [address]  [place] [address]   [phone]              Who to contact     If you have questions or need follow up information about today's clinic visit or your schedule please contact [facility] directly at [phone].  Normal or non-critical lab and imaging results will be communicated to you by MyChart, letter or phone within 4 business days after the clinic has received the results. If you do not hear from us  within 7 days, please contact the clinic through Bulbstorm or phone. If you have a critical or abnormal lab result, we will notify you by phone as soon as possible.  Submit refill requests through Bulbstorm or call your pharmacy and they will forward the refill request to us. Please allow 3 business days for your refill to be completed.          Additional Information About Your Visit        [email or web address]     Bulbstorm gives you secure access to your electronic health record. If you see a primary care provider, you can also send messages to your care team and make appointments. If you have questions, please call your primary care clinic.  If you do not have a primary care provider, please call [phone] and they will assist you.        Care EveryWhere ID     This is your Care EveryWhere ID. This could be used by other organizations to access your [place] medical records  [id number]         Blood Pressure from Last 3 Encounters:   04/10/18 128/79   03/16/18 117/71   03/14/18 109/71    Weight from Last 3 Encounters:   04/10/18 81.6 kg (180 lb)   03/22/18 81.6 kg (180 lb)   03/14/18 81.1 kg (178 lb 12.8 oz)              We Performed the Following     Therapeutic Activities     Therapeutic Exercises          Today's Medication Changes          These changes are accurate as of 4/19/18  5:50 PM.  If you have any questions, ask your nurse or doctor.               These medicines have changed or have updated prescriptions.        Dose/Directions    omeprazole 20 MG CR capsule   Commonly known as:  priLOSEC   This may have changed:    - when to take this  - reasons to take this   Used for:  Gastroesophageal reflux disease without esophagitis        Dose:  20 mg   Take 1 capsule (20 mg) by mouth daily   Quantity:  90 capsule   Refills:  3                Primary Care Provider Office Phone # Fax #    Saravanan Bocanegra [phone]       [address]  [facility] [address]        Equal Access to Services      KHUSHBU FORD : Hadii aad ku maribel Cha, waaxda luqadaha, qaybta kaalmada aderishi, corwin stephan bobbyjohanna ronquillo radhailya ferro . So [facility] [phone].    ATENCIÓN: Si habla español, tiene a powers disposición servicios gratuitos de asistencia lingüística. Llame al [phone].    We comply with applicable federal civil rights laws and [place] laws. We do not discriminate on the basis of race, color, national origin, age, disability, sex, sexual orientation, or gender identity.            Thank you!     Thank you for choosing [facility]  for your care. Our goal is always to provide you with excellent care. Hearing back from our patients is one way we can continue to improve our services. Please take a few minutes to complete the written survey that you may receive in the mail after your visit with us. Thank you!             Your Updated Medication List - Protect others around you: Learn how to safely use, store and throw away your medicines at www.disposemymeds.org.          This list is accurate as of 4/19/18  5:50 PM.  Always use your most recent med list.                   Brand Name Dispense Instructions for use Diagnosis    aspirin 81 MG tablet     90 tablet    Take 81 mg by mouth as needed        atenolol-chlorthalidone 50-25 MG per tablet    TENORETIC    30 tablet    TAKE ONE TABLET BY MOUTH ONE TIME DAILY    Essential hypertension with goal blood pressure less than 140/90       HYDROcodone-acetaminophen 5-325 MG per tablet    NORCO    24 tablet    Take 1-2 tablets by mouth every 4 hours as needed for other (Moderate to Severe Pain)    Complex tear of medial meniscus of right knee as current injury, subsequent encounter, Chondromalacia of right knee       naproxen 500 MG tablet    NAPROSYN    30 tablet    Take 1 tablet (500 mg) by mouth 2 times daily as needed for moderate pain    Effusion of bursa of right knee       omeprazole 20 MG CR capsule    priLOSEC    90 capsule     Take 1 capsule (20 mg) by mouth daily    Gastroesophageal reflux disease without esophagitis       probiotic Caps     30 capsule    VSL Probiotic- Take 2 caps daily while on antibiotics    H. pylori infection

## 2018-04-23 ENCOUNTER — OFFICE VISIT (OUTPATIENT)
Dept: FAMILY MEDICINE | Facility: CLINIC | Age: 53
End: 2018-04-23
Payer: COMMERCIAL

## 2018-04-23 ENCOUNTER — THERAPY VISIT (OUTPATIENT)
Dept: PHYSICAL THERAPY | Facility: CLINIC | Age: 53
End: 2018-04-23
Payer: COMMERCIAL

## 2018-04-23 VITALS
HEART RATE: 58 BPM | RESPIRATION RATE: 14 BRPM | OXYGEN SATURATION: 97 % | TEMPERATURE: 98 F | HEIGHT: 66 IN | WEIGHT: 175.6 LBS | BODY MASS INDEX: 28.22 KG/M2 | DIASTOLIC BLOOD PRESSURE: 75 MMHG | SYSTOLIC BLOOD PRESSURE: 119 MMHG

## 2018-04-23 DIAGNOSIS — A04.8 H. PYLORI INFECTION: Primary | ICD-10-CM

## 2018-04-23 DIAGNOSIS — Z11.4 SCREENING FOR HUMAN IMMUNODEFICIENCY VIRUS: ICD-10-CM

## 2018-04-23 DIAGNOSIS — M25.561 RIGHT KNEE PAIN: ICD-10-CM

## 2018-04-23 DIAGNOSIS — Z98.890 S/P ARTHROSCOPIC PARTIAL MEDIAL MENISCECTOMY: ICD-10-CM

## 2018-04-23 PROCEDURE — 99213 OFFICE O/P EST LOW 20 MIN: CPT | Mod: 24 | Performed by: PHYSICIAN ASSISTANT

## 2018-04-23 PROCEDURE — 97530 THERAPEUTIC ACTIVITIES: CPT | Mod: GP | Performed by: PHYSICAL THERAPIST

## 2018-04-23 PROCEDURE — 97110 THERAPEUTIC EXERCISES: CPT | Mod: GP | Performed by: PHYSICAL THERAPIST

## 2018-04-23 RX ORDER — FAMOTIDINE 40 MG/1
40 TABLET, FILM COATED ORAL DAILY
Qty: 30 TABLET | Refills: 0 | Status: SHIPPED | OUTPATIENT
Start: 2018-04-23 | End: 2018-05-23

## 2018-04-23 ASSESSMENT — PAIN SCALES - GENERAL: PAINLEVEL: NO PAIN (0)

## 2018-04-23 NOTE — PROGRESS NOTES
SUBJECTIVE:   Thanh Loaiza is a 52 year old male who presents to clinic today for the following health issues:      Concern - follow up H Pylori infectoin, tx'd in early march  Onset: Feb, 2018    Description:   Pt states having to use the rest room more often (4 times in a row)    Intensity: moderate    Progression of Symptoms:  Improved, but sx's are starting again since completing Amoxicillin    Accompanying Signs & Symptoms:  None    Previous history of similar problem:   Yes    Precipitating factors:   Worsened by: spicy foods    Alleviating factors:  Improved by: None    Therapies Tried and outcome: None       Over last 2-3 weeks, having freq BMs in the morning.  At work is ok.  No diarrhea but softer than usual.  Had similar prior to tx.  Has been off the PPI since ended ABX tx.                    No Known Allergies    Patient Active Problem List   Diagnosis     Erectile dysfunction     CARDIOVASCULAR SCREENING; LDL GOAL LESS THAN 130     Disorder of sacrum     Lumbago     Essential hypertension     DJD (degenerative joint disease), lumbar     Cataract extraction status of right eye     Gastroesophageal reflux disease without esophagitis     H. pylori infection     Complex tear of medial meniscus of right knee as current injury, subsequent encounter     Chondromalacia of right knee     S/P arthroscopy of right knee     Right knee pain     S/P arthroscopic partial medial meniscectomy       Past Medical History:   Diagnosis Date     DDD (degenerative disc disease), lumbar      DJD (degenerative joint disease), lumbar      Hypertension goal BP (blood pressure) < 140/90 12/14/2010         Current Outpatient Prescriptions on File Prior to Visit:  aspirin 81 MG tablet Take 81 mg by mouth as needed    atenolol-chlorthalidone (TENORETIC) 50-25 MG per tablet TAKE ONE TABLET BY MOUTH ONE TIME DAILY    HYDROcodone-acetaminophen (NORCO) 5-325 MG per tablet Take 1-2 tablets by mouth every 4 hours as needed for other  "(Moderate to Severe Pain)   naproxen (NAPROSYN) 500 MG tablet Take 1 tablet (500 mg) by mouth 2 times daily as needed for moderate pain   omeprazole (PRILOSEC) 20 MG capsule Take 1 capsule (20 mg) by mouth daily (Patient taking differently: Take 20 mg by mouth as needed )   probiotic CAPS VSL Probiotic- Take 2 caps daily while on antibiotics     No current facility-administered medications on file prior to visit.     Social History   Substance Use Topics     Smoking status: Never Smoker     Smokeless tobacco: Never Used     Alcohol use No       Family History   Problem Relation Age of Onset     Hypertension Mother      DIABETES Father      Hypertension Father      Hypertension Brother          ROS:  General: negative for fever    GI: as above  : negative for dysuria       OBJECTIVE:  /75 (BP Location: Left arm, Patient Position: Sitting, Cuff Size: Adult Regular)  Pulse 58  Temp 98  F (36.7  C) (Oral)  Resp 14  Ht 1.676 m (5' 6\")  Wt 79.7 kg (175 lb 9.6 oz)  SpO2 97%  BMI 28.34 kg/m2   General:   awake, alert, and cooperative.  NAD.   Head: Normocephalic, atraumatic.  Eyes: Conjunctiva clear, non icteric.   Heart: Regular rate and rhythm. No murmur.  Lungs: Chest is clear; no wheezes or rales.  ABD: soft, no tenderness to palpation , no rigidity, guarding or rebound , bowel sounds intact  Neuro: Alert and oriented - normal speech.     ASSESSMENT:well appearing- will retest, if + tx for resistant h pylori    ICD-10-CM    1. H. pylori infection A04.8 H Pylori antigen stool     famotidine (PEPCID) 40 MG tablet   2. Screening for human immunodeficiency virus Z11.4 HIV Antigen Antibody Combo           PLAN:        Advised about symptoms which might herald more serious problems.            "

## 2018-04-23 NOTE — MR AVS SNAPSHOT
After Visit Summary   4/23/2018    Thanh Loaiza    MRN: [id number]           Patient Information     Date Of Birth          1965        Visit Information        Provider Department      4/23/2018 10:20 AM Ernesto Holland PA [facility]        Today's Diagnoses     H. pylori infection    -  1    Screening for human immunodeficiency virus          Care Instructions      Understanding H. pylori and Ulcers     An ulcer can form in two areas of the digestive tract; the stomach and the duodenum (where the stomach meets the small intestine).     Traditionally, ulcers, or sores in the lining of your digestive tract, were thought to be caused by too much spicy food, stress, or an anxious personality. We now know that most ulcers are probably due to infection with bacteria known as Helicobacter pylori (H. pylori).  Common ulcer symptoms  These include the following:    Burning, cramping, or hungerlike pain in the stomach area, often 1 to 3 hours after a meal or in the middle of the night    Pain that gets better or worse with eating    Nausea or vomiting    Black, tarry, or bloody stools (which means the ulcer is bleeding)  Or you may have no symptoms.  Your evaluation  An evaluation by your healthcare provider can show if you have an ulcer and determine whether it was caused by H. pylori. Your healthcare provider may ask you questions, examine you, and possibly do some tests. These may include:    A special X-ray called an upper gastrointestinal series, to help locate an ulcer. Before the test, you drink a chalky liquid, called barium. This liquid helps the ulcer show up on the X-ray.    An endoscopic exam, done with a long tube with a camera on the end. The tube is passed through your mouth into your stomach, and allows the healthcare provider to get a closer look at your ulcer. You will be lightly sedated for this procedure. Your healthcare provider can also take a tissue  sample to test for H. pylori.    Blood, stool, and breath tests are also available to show whether you have H. pylori in your digestive tract.  Your treatment  To kill H. pylori so your ulcer can heal, your healthcare provider will probably prescribe antibiotics. Other ulcer medicines that help reduce stomach acid may also be prescribed as well. Testing after treatment may be recommended to be sure the H. pylori infection is gone. Usually, killing H. pylori helps keep the ulcer from returning. However, you can develop ulcers more than once in your lifetime.   Date Last Reviewed: 7/1/2016 2000-2017 [employer]. [address] [place] [address]. All rights reserved. This information is not intended as a substitute for professional medical care. Always follow your healthcare professional's instructions.                Follow-ups after your visit        Follow-up notes from your care team     Return if symptoms worsen or fail to improve.      Your next 10 appointments already scheduled     Apr 24, 2018  9:15 AM CDT   Return Visit with Dale Lester MD   [facility]    [address] [address]   [phone]            Apr 26, 2018  8:20 AM CDT   SYDNEE Extremity with Suzi White PT   [facility] ([facility]  )    [address]  [place] [address]   [phone]            Apr 30, 2018  9:00 AM CDT   SYDNEE Extremity with Suzi White PT   [facility] ([facility]  )    [address] [facility] [address]   [phone]            May 03, 2018  9:00 AM CDT   SYDNEE Extremity with Suzi White PT   [facility] ([facility]  )    [address] [facility] [address]   [phone]              Future tests that were ordered for you today     Open Future  "Orders        Priority Expected Expires Ordered    HIV Antigen Antibody Combo Routine  4/23/2019 4/23/2018    H Pylori antigen stool Routine  5/23/2018 4/23/2018            Who to contact     If you have questions or need follow up information about today's clinic visit or your schedule please contact [facility] directly at [phone].  Normal or non-critical lab and imaging results will be communicated to you by MyChart, letter or phone within 4 business days after the clinic has received the results. If you do not hear from us within 7 days, please contact the clinic through Clicksthart or phone. If you have a critical or abnormal lab result, we will notify you by phone as soon as possible.  Submit refill requests through Wellbeats or call your pharmacy and they will forward the refill request to us. Please allow 3 business days for your refill to be completed.          Additional Information About Your Visit        [employer] Information     Well[employer] gives you secure access to your electronic health record. If you see a primary care provider, you can also send messages to your care team and make appointments. If you have questions, please call your primary care clinic.  If you do not have a primary care provider, please call [phone] and they will assist you.        Care EveryWhere ID     This is your Care EveryWhere ID. This could be used by other organizations to access your [place] medical records  [id number]        Your Vitals Were     Pulse Temperature Respirations Height Pulse Oximetry BMI (Body Mass Index)    58 98  F (36.7  C) (Oral) 14 1.676 m (5' 6\") 97% 28.34 kg/m2       Blood Pressure from Last 3 Encounters:   04/23/18 119/75   04/10/18 128/79   03/16/18 117/71    Weight from Last 3 Encounters:   04/23/18 79.7 kg (175 lb 9.6 oz)   04/10/18 81.6 kg (180 lb)   03/22/18 81.6 kg (180 lb)                 Today's Medication Changes          These changes are accurate as of 4/23/18 10:41 " AM.  If you have any questions, ask your nurse or doctor.               Start taking these medicines.        Dose/Directions    famotidine 40 MG tablet   Commonly known as:  PEPCID   Used for:  H. pylori infection   Started by:  Ernesto Holland PA        Dose:  40 mg   Take 1 tablet (40 mg) by mouth daily   Quantity:  30 tablet   Refills:  0         These medicines have changed or have updated prescriptions.        Dose/Directions    omeprazole 20 MG CR capsule   Commonly known as:  priLOSEC   This may have changed:    - when to take this  - reasons to take this   Used for:  Gastroesophageal reflux disease without esophagitis        Dose:  20 mg   Take 1 capsule (20 mg) by mouth daily   Quantity:  90 capsule   Refills:  3            Where to get your medicines      These medications were sent to [facility] [place] - [address]  [address], [facility] [address]     Phone:  [phone]     famotidine 40 MG tablet                Primary Care Provider Office Phone # Fax #    Saravanan Bocanegra [phone]       [address]  [place] [facility] [address]        Equal Access to Services     [facility]: Hadii aad ku hadasho Soomaali, waaxda luqadaha, qaybta kaalmada adeegyada, corwin rolon. So [facility] [phone].    ATENCIÓN: Si habla español, tiene a powers disposición servicios gratuitos de asistencia lingüística. [facility] [phone].    We comply with applicable federal civil rights laws and [place] laws. We do not discriminate on the basis of race, color, national origin, age, disability, sex, sexual orientation, or gender identity.            Thank you!     Thank you for choosing [facility]  for your care. Our goal is always to provide you with excellent care. Hearing back from our patients is one way we can continue to improve our services. Please take a few minutes to complete the written survey that  you may receive in the mail after your visit with us. Thank you!             Your Updated Medication List - Protect others around you: Learn how to safely use, store and throw away your medicines at www.disposemymeds.org.          This list is accurate as of 4/23/18 10:41 AM.  Always use your most recent med list.                   Brand Name Dispense Instructions for use Diagnosis    aspirin 81 MG tablet     90 tablet    Take 81 mg by mouth as needed        atenolol-chlorthalidone 50-25 MG per tablet    TENORETIC    30 tablet    TAKE ONE TABLET BY MOUTH ONE TIME DAILY    Essential hypertension with goal blood pressure less than 140/90       famotidine 40 MG tablet    PEPCID    30 tablet    Take 1 tablet (40 mg) by mouth daily    H. pylori infection       HYDROcodone-acetaminophen 5-325 MG per tablet    NORCO    24 tablet    Take 1-2 tablets by mouth every 4 hours as needed for other (Moderate to Severe Pain)    Complex tear of medial meniscus of right knee as current injury, subsequent encounter, Chondromalacia of right knee       naproxen 500 MG tablet    NAPROSYN    30 tablet    Take 1 tablet (500 mg) by mouth 2 times daily as needed for moderate pain    Effusion of bursa of right knee       omeprazole 20 MG CR capsule    priLOSEC    90 capsule    Take 1 capsule (20 mg) by mouth daily    Gastroesophageal reflux disease without esophagitis       probiotic Caps     30 capsule    VSL Probiotic- Take 2 caps daily while on antibiotics    H. pylori infection

## 2018-04-23 NOTE — MR AVS SNAPSHOT
After Visit Summary   4/23/2018    Thanh Loaiza    MRN: [id number]           Patient Information     Date Of Birth          1965        Visit Information        Provider Department      4/23/2018 9:00 AM Suzi White, PT [facility]        Today's Diagnoses     Right knee pain        S/P arthroscopic partial medial meniscectomy           Follow-ups after your visit        Your next 10 appointments already scheduled     Apr 24, 2018  9:15 AM CDT   Return Visit with Dale Lester MD   [facility] ([facility])    [address] [address] [address]   [phone]            Apr 26, 2018  8:20 AM CDT   SYDNEE Extremity with Suzi White PT   [facility] ([facility] [place]  )    [address]  [place] [address]   [phone]            Apr 30, 2018  9:00 AM CDT   SYDNEE Extremity with Suzi White PT   [facility] ([facility] [place]  )    [address]  [place] [address]   [phone]            May 03, 2018  9:00 AM CDT   SYDNEE Extremity with Suzi White PT   [facility] ([facility] [place]  )    [address]  [place] [address]   [phone]              Future tests that were ordered for you today     Open Future Orders        Priority Expected Expires Ordered    HIV Antigen Antibody Combo Routine  4/23/2019 4/23/2018    H Pylori antigen stool Routine  5/23/2018 4/23/2018            Who to contact     If you have questions or need follow up information about today's clinic visit or your schedule please contact [facility] directly at [phone].  Normal or non-critical lab and imaging results will be communicated to you by MyChart, letter or phone within 4 business days after the clinic has received the results. If you  do not hear from us within 7 days, please contact the clinic through Seesaw or phone. If you have a critical or abnormal lab result, we will notify you by phone as soon as possible.  Submit refill requests through Seesaw or call your pharmacy and they will forward the refill request to us. Please allow 3 business days for your refill to be completed.          Additional Information About Your Visit        mSpokehart Information     Seesaw gives you secure access to your electronic health record. If you see a primary care provider, you can also send messages to your care team and make appointments. If you have questions, please call your primary care clinic.  If you do not have a primary care provider, please call [phone] and they will assist you.        Care EveryWhere ID     This is your Care EveryWhere ID. This could be used by other organizations to access your [facility] medical records  [id number]         Blood Pressure from Last 3 Encounters:   04/23/18 119/75   04/10/18 128/79   03/16/18 117/71    Weight from Last 3 Encounters:   04/23/18 79.7 kg (175 lb 9.6 oz)   04/10/18 81.6 kg (180 lb)   03/22/18 81.6 kg (180 lb)              We Performed the Following     [facility] Progress Notes Report     Therapeutic Activities     Therapeutic Exercises          Today's Medication Changes          These changes are accurate as of 4/23/18  4:13 PM.  If you have any questions, ask your nurse or doctor.               Start taking these medicines.        Dose/Directions    famotidine 40 MG tablet   Commonly known as:  PEPCID   Used for:  H. pylori infection   Started by:  Ernesto Holland PA        Dose:  40 mg   Take 1 tablet (40 mg) by mouth daily   Quantity:  30 tablet   Refills:  0         These medicines have changed or have updated prescriptions.        Dose/Directions    omeprazole 20 MG CR capsule   Commonly known as:  priLOSEC   This may have changed:    - when to take this  - reasons to take this   Used  for:  Gastroesophageal reflux disease without esophagitis        Dose:  20 mg   Take 1 capsule (20 mg) by mouth daily   Quantity:  90 capsule   Refills:  3            Where to get your medicines      These medications [place] [facility] [place] [address]  [address], [facility] [address]     Phone:  [phone]     famotidine 40 MG tablet                Primary Care Provider Office Phone # Fax #    Saravanan Bocanegra [phone]       [address]  [facility] [address]        Equal Access to Services     [facility]: Hadii aad ku hadasho Soomaali, waaxda luqadaha, qaybta kaalmada adeegyada, waxzulema idiin hayaajohanna ronquillo kharailya ferro . So [facility] [phone].    ATENCIÓN: Si habla español, tiene a powers disposición servicios gratuitos de asistencia lingüística. [facility] [phone].    We comply with applicable federal civil rights laws and [place] laws. We do not discriminate on the basis of race, color, national origin, age, disability, sex, sexual orientation, or gender identity.            Thank you!     Thank you for choosing [place]  for your care. Our goal is always to provide you with excellent care. Hearing back from our patients is one way we can continue to improve our services. Please take a few minutes to complete the written survey that you may receive in the mail after your visit with us. Thank you!             Your Updated Medication List - Protect others around you: Learn how to safely use, store and throw away your medicines at www.disposemymeds.org.          This list is accurate as of 4/23/18  4:13 PM.  Always use your most recent med list.                   Brand Name Dispense Instructions for use Diagnosis    aspirin 81 MG tablet     90 tablet    Take 81 mg by mouth as needed        atenolol-chlorthalidone 50-25 MG per tablet    TENORETIC    30 tablet    TAKE ONE TABLET BY MOUTH ONE TIME  DAILY    Essential hypertension with goal blood pressure less than 140/90       famotidine 40 MG tablet    PEPCID    30 tablet    Take 1 tablet (40 mg) by mouth daily    H. pylori infection       HYDROcodone-acetaminophen 5-325 MG per tablet    NORCO    24 tablet    Take 1-2 tablets by mouth every 4 hours as needed for other (Moderate to Severe Pain)    Complex tear of medial meniscus of right knee as current injury, subsequent encounter, Chondromalacia of right knee       naproxen 500 MG tablet    NAPROSYN    30 tablet    Take 1 tablet (500 mg) by mouth 2 times daily as needed for moderate pain    Effusion of bursa of right knee       omeprazole 20 MG CR capsule    priLOSEC    90 capsule    Take 1 capsule (20 mg) by mouth daily    Gastroesophageal reflux disease without esophagitis       probiotic Caps     30 capsule    VSL Probiotic- Take 2 caps daily while on antibiotics    H. pylori infection

## 2018-04-23 NOTE — PROGRESS NOTES
Subjective:  HPI                    Objective:  System    Physical Exam    General     ROS    Assessment/Plan:    PROGRESS  REPORT    Progress reporting period is from 3-29-18 to 4-23-18.       SUBJECTIVE  Subjective changes noted by patient:  Patient reports that he feels he is continuing to improve. He is using one crutch now at times. The pain level is not bad and he thinks the knee is getting straighter. He does his exercises, but not probably as much as he should be.      Current Pain level: 0/10.     Previous pain level was  5/10  .   Changes in function:  Yes (See Goal flowsheet attached for changes in current functional level)  Adverse reaction to treatment or activity: None    OBJECTIVE  Changes noted in objective findings:   Supine R knee AAROM: 0-9-129. Patient able to ambulate with one crutch with much improved gait pattern, but still lacks full R knee extension on stance. Improved contraction and strength of R quads. He is now able to do a supine SLR without assist.     ASSESSMENT/PLAN  Updated problem list and treatment plan: Diagnosis 1:  S/p R partial menisectomy  Pain -  hot/cold therapy, manual therapy, self management and education  Decreased ROM/flexibility - manual therapy, therapeutic exercise and home program  Decreased strength - therapeutic exercise, therapeutic activities and home program  Impaired balance - neuro re-education, gait training, therapeutic activities and home program  Decreased proprioception - neuro re-education, therapeutic activities and home program  Edema - cold therapy and self management/home program  Impaired gait - gait training and home program  STG/LTGs have been met or progress has been made towards goals:  Yes (See Goal flow sheet completed today.)  Assessment of Progress: The patient's condition is improving.  The patient's condition has potential to improve.  Self Management Plans:  Patient has been instructed in a home treatment program.  Patient  has been  instructed in self management of symptoms.    Thanh continues to require the following intervention to meet STG and LTG's:  PT    Recommendations:  This patient would benefit from continued therapy.     Frequency:  1-2 X week, once daily  Duration:  for 6-8 weeks        Please refer to the daily flowsheet for treatment today, total treatment time and time spent performing 1:1 timed codes.

## 2018-04-23 NOTE — PATIENT INSTRUCTIONS
Understanding H. pylori and Ulcers     An ulcer can form in two areas of the digestive tract; the stomach and the duodenum (where the stomach meets the small intestine).     Traditionally, ulcers, or sores in the lining of your digestive tract, were thought to be caused by too much spicy food, stress, or an anxious personality. We now know that most ulcers are probably due to infection with bacteria known as Helicobacter pylori (H. pylori).  Common ulcer symptoms  These include the following:    Burning, cramping, or hungerlike pain in the stomach area, often 1 to 3 hours after a meal or in the middle of the night    Pain that gets better or worse with eating    Nausea or vomiting    Black, tarry, or bloody stools (which means the ulcer is bleeding)  Or you may have no symptoms.  Your evaluation  An evaluation by your healthcare provider can show if you have an ulcer and determine whether it was caused by H. pylori. Your healthcare provider may ask you questions, examine you, and possibly do some tests. These may include:    A special X-ray called an upper gastrointestinal series, to help locate an ulcer. Before the test, you drink a chalky liquid, called barium. This liquid helps the ulcer show up on the X-ray.    An endoscopic exam, done with a long tube with a camera on the end. The tube is passed through your mouth into your stomach, and allows the healthcare provider to get a closer look at your ulcer. You will be lightly sedated for this procedure. Your healthcare provider can also take a tissue sample to test for H. pylori.    Blood, stool, and breath tests are also available to show whether you have H. pylori in your digestive tract.  Your treatment  To kill H. pylori so your ulcer can heal, your healthcare provider will probably prescribe antibiotics. Other ulcer medicines that help reduce stomach acid may also be prescribed as well. Testing after treatment may be recommended to be sure the H. pylori  infection is gone. Usually, killing H. pylori helps keep the ulcer from returning. However, you can develop ulcers more than once in your lifetime.   Date Last Reviewed: 7/1/2016 2000-2017 [employer]. [address] [place] [address], [place] [address]. All rights reserved. This information is not intended as a substitute for professional medical care. Always follow your healthcare professional's instructions.

## 2018-04-26 ENCOUNTER — THERAPY VISIT (OUTPATIENT)
Dept: PHYSICAL THERAPY | Facility: CLINIC | Age: 53
End: 2018-04-26
Payer: COMMERCIAL

## 2018-04-26 DIAGNOSIS — M25.561 RIGHT KNEE PAIN: ICD-10-CM

## 2018-04-26 DIAGNOSIS — A04.8 H. PYLORI INFECTION: ICD-10-CM

## 2018-04-26 DIAGNOSIS — Z98.890 S/P ARTHROSCOPIC PARTIAL MEDIAL MENISCECTOMY: ICD-10-CM

## 2018-04-26 PROCEDURE — 97110 THERAPEUTIC EXERCISES: CPT | Mod: GP | Performed by: PHYSICAL THERAPIST

## 2018-04-26 PROCEDURE — 97530 THERAPEUTIC ACTIVITIES: CPT | Mod: GP | Performed by: PHYSICAL THERAPIST

## 2018-04-26 PROCEDURE — 87338 HPYLORI STOOL AG IA: CPT | Performed by: PHYSICIAN ASSISTANT

## 2018-04-27 LAB
H PYLORI AG STL QL IA: NORMAL
SPECIMEN SOURCE: NORMAL

## 2018-04-28 ENCOUNTER — OFFICE VISIT (OUTPATIENT)
Dept: URGENT CARE | Facility: URGENT CARE | Age: 53
End: 2018-04-28
Payer: COMMERCIAL

## 2018-04-28 VITALS
WEIGHT: 173 LBS | HEART RATE: 62 BPM | DIASTOLIC BLOOD PRESSURE: 73 MMHG | BODY MASS INDEX: 27.92 KG/M2 | OXYGEN SATURATION: 96 % | RESPIRATION RATE: 16 BRPM | SYSTOLIC BLOOD PRESSURE: 115 MMHG | TEMPERATURE: 98.2 F

## 2018-04-28 DIAGNOSIS — K40.90 RIGHT INGUINAL HERNIA: Primary | ICD-10-CM

## 2018-04-28 PROCEDURE — 99214 OFFICE O/P EST MOD 30 MIN: CPT | Mod: 24 | Performed by: FAMILY MEDICINE

## 2018-04-28 NOTE — PATIENT INSTRUCTIONS
What Is a Hernia?    A hernia is when an organ or tissue pushes through a weak area in the belly (abdominal) wall. This weak area may be present at birth. Or it may be caused by abdominal strain over time. If not treated, a hernia can get worse with time and physical stress.  When a bulge forms  When there is a weak area in the abdominal wall, an organ or tissue can push outward. This often causes a bulge that you can see under your skin. The bulge may get bigger when you stand up. It may go away when you lie down. You may also feel some pressure or mild pain when lifting, coughing, urinating, or doing other activities.  Types of hernias  The type of hernia you have depends on its location. Most hernias form in the groin at or near the internal ring. This is the entrance to a canal between the abdomen and groin. Hernias can also occur in the abdomen, thigh, or genitals.    An incisional hernia occurs at the site of a previous surgical incision.    An umbilical hernia occurs at the navel.    An indirect inguinal hernia occurs in the groin at the internal ring.    A direct inguinal hernia occurs in the groin near the internal ring.    A femoral hernia occurs just below the groin.    An epigastric hernia occurs in the upper abdomen at the midline.  Diagnosis  In most cases, your healthcare provider can diagnose a hernia by doing a physical exam.  In some cases it might not be clear why you have a swelling in the belly wall. Then your provider may order an imaging test such as an ultrasound. This can help with the diagnosis.  Surgery  A hernia will not heal on its own. Surgery is needed to fix the weak spot in the abdominal wall. If not treated, a hernia can get larger. It can also cause serious health problems. The good news is that hernia surgery can be done quickly and safely. In some cases, you can go home the same day as your surgery.   When to call your provider  Call your healthcare provider right away if the  swelling around your hernia becomes larger, firmer, or more painful. These may be signs that your intestines are stuck in the abdominal wall. This is an emergency. The hernia must be repaired right away to avoid serious problems.  Date Last Reviewed: 7/1/2016 2000-2017 [employer]. [address] [place] [address]. All rights reserved. This information is not intended as a substitute for professional medical care. Always follow your healthcare professional's instructions.

## 2018-04-28 NOTE — MR AVS SNAPSHOT
After Visit Summary   4/28/2018    Thanh Loaiza    MRN: [id number]           Patient Information     Date Of Birth          1965        Visit Information        Provider Department      4/28/2018 2:45 PM Jessica Salcedo MD [facility]        Today's Diagnoses     Right inguinal hernia    -  1      Care Instructions      What Is a Hernia?    A hernia is when an organ or tissue pushes through a weak area in the belly (abdominal) wall. This weak area may be present at birth. Or it may be caused by abdominal strain over time. If not treated, a hernia can get worse with time and physical stress.  When a bulge forms  When there is a weak area in the abdominal wall, an organ or tissue can push outward. This often causes a bulge that you can see under your skin. The bulge may get bigger when you stand up. It may go away when you lie down. You may also feel some pressure or mild pain when lifting, coughing, urinating, or doing other activities.  Types of hernias  The type of hernia you have depends on its location. Most hernias form in the groin at or near the internal ring. This is the entrance to a canal between the abdomen and groin. Hernias can also occur in the abdomen, thigh, or genitals.    An incisional hernia occurs at the site of a previous surgical incision.    An umbilical hernia occurs at the navel.    An indirect inguinal hernia occurs in the groin at the internal ring.    A direct inguinal hernia occurs in the groin near the internal ring.    A femoral hernia occurs just below the groin.    An epigastric hernia occurs in the upper abdomen at the midline.  Diagnosis  In most cases, your healthcare provider can diagnose a hernia by doing a physical exam.  In some cases it might not be clear why you have a swelling in the belly wall. Then your provider may order an imaging test such as an ultrasound. This can help with the diagnosis.  Surgery  A hernia will not heal on its  own. Surgery is needed to fix the weak spot in the abdominal wall. If not treated, a hernia can get larger. It can also cause serious health problems. The good news is that hernia surgery can be done quickly and safely. In some cases, you can go home the same day as your surgery.   When to call your provider  Call your healthcare provider right away if the swelling around your hernia becomes larger, firmer, or more painful. These may be signs that your intestines are stuck in the abdominal wall. This is an emergency. The hernia must be repaired right away to avoid serious problems.  Date Last Reviewed: 7/1/2016 2000-2017 [employer]. [address] [place] [address]. All rights reserved. This information is not intended as a substitute for professional medical care. Always follow your healthcare professional's instructions.                Follow-ups after your visit        Additional Services     GENERAL SURG ADULT REFERRAL       Your provider has referred you to: [facility]: [facility] [phone]   [email or web address]    Please be aware that coverage of these services is subject to the terms and limitations of your health insurance plan.  Call member services at your health plan with any benefit or coverage questions.      Please bring the following with you to your appointment:    (1) Any X-Rays, CTs or MRIs which have been performed.  Contact the facility where they were done to arrange for  prior to your scheduled appointment.   (2) List of current medications   (3) This referral request   (4) Any documents/labs given to you for this referral                  Follow-up notes from your care team     Return if symptoms worsen or fail to improve.      Your next 10 appointments already scheduled     Apr 30, 2018  9:00 AM CDT   SYDNEE Extremity with Suzi White, SARAHY   [facility] [place] ([facility]  )    [address]  [facility] [address]   [phone]            May 01, 2018  8:45 AM CDT   Return Visit with Dale Lester MD   [facility]    [address] [facility] [address]   [phone]            May 03, 2018  9:00 AM CDT   SYDNEE Extremity with Suzi White, PT   [facility] [place] ([facility]  )    [address]  [facility] [address]   [phone]            May 10, 2018  8:20 AM CDT   SYDNEE Extremity with Suzi White PT   [facility] ([facility]  )    [address]  [facility] [address]   [phone]            May 17, 2018  9:00 AM CDT   SYDNEE Extremity with Suzi White, PT   [facility] ([facility]  )    [address] [facility] [address]   [phone]            May 24, 2018  8:20 AM CDT   SYDNEE Extremity with Suzi White, PT   [facility] ([facility]  )    [address] [facility] [address]   [phone]              Future tests that were ordered for you today     Open Future Orders        Priority Expected Expires Ordered    CT Abdomen Pelvis w Contrast Routine  4/28/2019 4/28/2018            Who to contact     If you have questions or need follow up information about today's clinic visit or your schedule please contact [facility] directly at [phone].  Normal or non-critical lab and imaging results will be communicated to you by MyChart, letter or phone within 4 business days after the clinic has received the results. If you do not hear from us within 7 days, please contact the clinic through MyChart or phone. If you have a critical or abnormal lab result, we will notify you by phone as soon as possible.  Submit refill requests through  Spr[employer]gle Kids or call your pharmacy and they will forward the refill request to us. Please allow 3 business days for your refill to be completed.          Additional Information About Your Visit        MyChart Information     [employer] gives you secure access to your electronic health record. If you see a primary care provider, you can also send messages to your care team and make appointments. If you have questions, please call your primary care clinic.  If you do not have a primary care provider, please call [phone] and they will assist you.        Care EveryWhere ID     This is your Care EveryWhere ID. This could be used by other organizations to access your [facility] medical records  [id number]        Your Vitals Were     Pulse Temperature Respirations Pulse Oximetry BMI (Body Mass Index)       62 98.2  F (36.8  C) (Oral) 16 96% 27.92 kg/m2        Blood Pressure from Last 3 Encounters:   04/28/18 115/73   04/23/18 119/75   04/10/18 128/79    Weight from Last 3 Encounters:   04/28/18 173 lb (78.5 kg)   04/23/18 175 lb 9.6 oz (79.7 kg)   04/10/18 180 lb (81.6 kg)              We Performed the Following     GENERAL SURG ADULT REFERRAL        Primary Care Provider Office Phone # Fax #    Saravanan Bocanegra [phone]       [address]  [facility] [address]        Equal Access to Services     [facility] : Hadii aad ku hadasho Soomaali, waaxda luqadaha, qaybta kaalmada adeegyada, corwin rolon. So [facility] [phone].    ATENCIÓN: Si habla español, tiene a powers disposición servicios gratuitos de asistencia lingüística. Lorene al [phone].    We comply with applicable federal civil rights laws and [place] laws. We do not discriminate on the basis of race, color, national origin, age, disability, sex, sexual orientation, or gender identity.            Thank you!     Thank you for choosing [facility]  for your care. Our goal is always to provide you with  excellent care. Hearing back from our patients is one way we can continue to improve our services. Please take a few minutes to complete the written survey that you may receive in the mail after your visit with us. Thank you!             Your Updated Medication List - Protect others around you: Learn how to safely use, store and throw away your medicines at www.disposemymeds.org.          This list is accurate as of 4/28/18  3:20 PM.  Always use your most recent med list.                   Brand Name Dispense Instructions for use Diagnosis    aspirin 81 MG tablet     90 tablet    Take 81 mg by mouth as needed        atenolol-chlorthalidone 50-25 MG per tablet    TENORETIC    30 tablet    TAKE ONE TABLET BY MOUTH ONE TIME DAILY    Essential hypertension with goal blood pressure less than 140/90       famotidine 40 MG tablet    PEPCID    30 tablet    Take 1 tablet (40 mg) by mouth daily    H. pylori infection       HYDROcodone-acetaminophen 5-325 MG per tablet    NORCO    24 tablet    Take 1-2 tablets by mouth every 4 hours as needed for other (Moderate to Severe Pain)    Complex tear of medial meniscus of right knee as current injury, subsequent encounter, Chondromalacia of right knee       naproxen 500 MG tablet    NAPROSYN    30 tablet    Take 1 tablet (500 mg) by mouth 2 times daily as needed for moderate pain    Effusion of bursa of right knee       omeprazole 20 MG CR capsule    priLOSEC    90 capsule    Take 1 capsule (20 mg) by mouth daily    Gastroesophageal reflux disease without esophagitis       probiotic Caps     30 capsule    VSL Probiotic- Take 2 caps daily while on antibiotics    H. pylori infection

## 2018-04-28 NOTE — PROGRESS NOTES
Concern - bulge in the inguinal region.   Onset: 2 days    Description:   Noticed a painless bulge in the right inguinal region    Intensity: mild    Progression of Symptoms:  same    Accompanying Signs & Symptoms:  None    Previous history of similar problem:   No    Precipitating factors: Unsure if this is related but reports that he is s/p arthroscopic partial medial meniscectomy of the right knee 1.5 months ago, currently using one crutch and doing PT  Worsened by: standing and straing    Alleviating factors:  Improved by: rest, sitting    Therapies Tried and outcome: none    Recently completed H pylori treatment, SLIM done was negative.     Problem list and histories reviewed & adjusted, as indicated.  Additional history: as documented    Patient Active Problem List   Diagnosis     Erectile dysfunction     CARDIOVASCULAR SCREENING; LDL GOAL LESS THAN 130     Disorder of sacrum     Lumbago     Essential hypertension     DJD (degenerative joint disease), lumbar     Cataract extraction status of right eye     Gastroesophageal reflux disease without esophagitis     H. pylori infection     Complex tear of medial meniscus of right knee as current injury, subsequent encounter     Chondromalacia of right knee     S/P arthroscopy of right knee     Right knee pain     S/P arthroscopic partial medial meniscectomy     Past Surgical History:   Procedure Laterality Date     ARTHROSCOPY KNEE Right 3/16/2018    Procedure: ARTHROSCOPY KNEE;  Right knee arthroscopy, debridement ;  Surgeon: Dale Lester MD;  Location: [facility] OR     COLONOSCOPY  10/14/2011    Procedure:COLONOSCOPY; Colonoscopy, change in stools, diarrhea; Surgeon:ALEIDA DELEON; Location:[facility] OR     COLONOSCOPY  10/14/2011    Procedure:COMBINED COLONOSCOPY, SINGLE BIOPSY/POLYPECTOMY BY BIOPSY; Surgeon:ALEIDA DELEON; Location: OR     ORTHOPEDIC SURGERY       TONSILLECTOMY      age 11 years       Social History   Substance Use Topics     Smoking status: Never  Smoker     Smokeless tobacco: Never Used     Alcohol use No     Family History   Problem Relation Age of Onset     Hypertension Mother      DIABETES Father      Hypertension Father      Hypertension Brother          Current Outpatient Prescriptions   Medication Sig Dispense Refill     aspirin 81 MG tablet Take 81 mg by mouth as needed  90 tablet 3     atenolol-chlorthalidone (TENORETIC) 50-25 MG per tablet TAKE ONE TABLET BY MOUTH ONE TIME DAILY  30 tablet 6     famotidine (PEPCID) 40 MG tablet Take 1 tablet (40 mg) by mouth daily (Patient not taking: Reported on 4/28/2018) 30 tablet 0     HYDROcodone-acetaminophen (NORCO) 5-325 MG per tablet Take 1-2 tablets by mouth every 4 hours as needed for other (Moderate to Severe Pain) (Patient not taking: Reported on 4/28/2018) 24 tablet 0     naproxen (NAPROSYN) 500 MG tablet Take 1 tablet (500 mg) by mouth 2 times daily as needed for moderate pain (Patient not taking: Reported on 4/28/2018) 30 tablet 0     omeprazole (PRILOSEC) 20 MG capsule Take 1 capsule (20 mg) by mouth daily (Patient not taking: Reported on 4/28/2018) 90 capsule 3     probiotic CAPS VSL Probiotic- Take 2 caps daily while on antibiotics (Patient not taking: Reported on 4/28/2018) 30 capsule 0     No Known Allergies    Reviewed and updated as needed this visit by clinical staff       Reviewed and updated as needed this visit by Provider         ROS:  Constitutional, HEENT, cardiovascular, pulmonary, gi and gu systems are negative, except as otherwise noted.    OBJECTIVE:     /73  Pulse 62  Temp 98.2  F (36.8  C) (Oral)  Resp 16  Wt 173 lb (78.5 kg)  SpO2 96%  BMI 27.92 kg/m2  Body mass index is 27.92 kg/(m^2).  GENERAL: healthy, alert and no distress  ABDOMEN: soft, nontender, no hepatosplenomegaly, no masses and bowel sounds normal. Reducible right inguinal hernia. No tenderness to palpation.   PSYCH: mentation appears normal, affect normal/bright    Diagnostic Test Results:  See orders  below    ASSESSMENT/PLAN:     Thanh was seen today for urgent care and derm problem.    Diagnoses and all orders for this visit:    Right inguinal hernia  -     CT Abdomen Pelvis w Contrast; Future  -     GENERAL SURG ADULT REFERRAL      Patient education and Handout given       Follow up if symptoms fail to improve or worsen.      The patient was in agreement with the plan today and had no questions or concerns prior to leaving the clinic.        Jessica Salcedo MD  [facility]

## 2018-04-29 ENCOUNTER — MYC MEDICAL ADVICE (OUTPATIENT)
Dept: FAMILY MEDICINE | Facility: CLINIC | Age: 53
End: 2018-04-29

## 2018-04-30 ENCOUNTER — THERAPY VISIT (OUTPATIENT)
Dept: PHYSICAL THERAPY | Facility: CLINIC | Age: 53
End: 2018-04-30
Payer: COMMERCIAL

## 2018-04-30 DIAGNOSIS — Z98.890 S/P ARTHROSCOPIC PARTIAL MEDIAL MENISCECTOMY: ICD-10-CM

## 2018-04-30 DIAGNOSIS — M25.561 RIGHT KNEE PAIN: ICD-10-CM

## 2018-04-30 PROCEDURE — 97110 THERAPEUTIC EXERCISES: CPT | Mod: GP | Performed by: PHYSICAL THERAPIST

## 2018-04-30 PROCEDURE — 97530 THERAPEUTIC ACTIVITIES: CPT | Mod: GP | Performed by: PHYSICAL THERAPIST

## 2018-04-30 NOTE — MR AVS SNAPSHOT
After Visit Summary   4/30/2018    Thanh Loaiza    MRN: [id number]           Patient Information     Date Of Birth          1965        Visit Information        Provider Department      4/30/2018 9:00 AM Suzi White PT [facility]        Today's Diagnoses     Right knee pain        S/P arthroscopic partial medial meniscectomy           Follow-ups after your visit        Your next 10 appointments already scheduled     May 01, 2018  8:45 AM CDT   Return Visit with Dale Lester MD   [facility] ([facility])    [address] [facility] [id number] [phone]            May 01, 2018 10:00 AM CDT   CT ABDOMEN PELVIS W CONTRAST with MGCT1   [facility]    [address] [address] [place] [address] [phone]           Please bring any scans or X-rays taken at other hospitals, if similar tests were done. Also bring a list of your medicines, including vitamins, minerals and over-the-counter drugs. It is safest to leave personal items at home.  Be sure to tell your doctor:   If you have any allergies.   If there s any chance you are pregnant.   If you are breastfeeding.  How to prepare:   Do not eat or drink for 2 hours before your exam. If you need to take medicine, you may take it with small sips of water. (We may ask you to take liquid medicine as well.)   Please wear loose clothing, such as a sweat suit or jogging clothes. Avoid snaps, zippers and other metal. We may ask you to undress and put on a hospital gown.  Please arrive 30 minutes early for your CT. Once in the department you might be asked to drink water 15-20 minutes prior to your exam.  If indicated you may be asked to drink an oral contrast in advance of your CT.  If this is the case, the imaging team will let you know or be in contact with you prior to your appointment  Patients  over 70 or patients with diabetes or kidney problems:   If you haven t had a blood test (creatinine test) within the last 30 days, the Cardiologist/Radiologist may require you to get this test prior to your exam.  If you have diabetes:   Continue to take your metformin medication on the day of your exam  If you have any questions, please call the Imaging Department where you will have your exam.            May 03, 2018  9:00 AM CDT   SYDNEE Extremity with Suzi White, PT   [facility] ([facility]  )    [address]  [facility] [address]   [phone]            May 10, 2018  8:20 AM CDT   SYDNEE Extremity with Suzi White, PT   [facility] ([facility]  )    [address] [facility] [address]   [phone]            May 17, 2018  9:00 AM CDT   SYDNEE Extremity with Suzi White, PT   [facility] ([facility]  )    [address] [facility] [address]   [phone]            May 24, 2018  8:20 AM CDT   SYDNEE Extremity with Suzi White, PT   [facility] ([facility]  )    [address] [facility] [address]   [phone]              Who to contact     If you have questions or need follow up information about today's clinic visit or your schedule please contact [facility] directly at [phone].  Normal or non-critical lab and imaging results will be communicated to you by MyChart, letter or phone within 4 business days after the clinic has received the results. If you do not hear from us within 7 days, please contact the clinic through MyChart or phone. If you have a critical or abnormal lab result, we will notify you by phone as soon as possible.  Submit refill requests through [employer] or call your pharmacy and they will forward the refill request to us. Please allow 3  business days for your refill to be completed.          Additional Information About Your Visit        MyChart Information     [employer] gives you secure access to your electronic health record. If you see a primary care provider, you can also send messages to your care team and make appointments. If you have questions, please call your primary care clinic.  If you do not have a primary care provider, please call [phone] and they will assist you.        Care EveryWhere ID     This is your Care EveryWhere ID. This could be used by other organizations to access your [place] medical records  [id number]         Blood Pressure from Last 3 Encounters:   04/28/18 115/73   04/23/18 119/75   04/10/18 128/79    Weight from Last 3 Encounters:   04/28/18 78.5 kg (173 lb)   04/23/18 79.7 kg (175 lb 9.6 oz)   04/10/18 81.6 kg (180 lb)              We Performed the Following     Therapeutic Activities     Therapeutic Exercises        Primary Care Provider Office Phone # Fax #    Saravanan Bocanegra [phone]       [address]  [facility] [address]        Equal Access to Services     [facility] : Hadii aad ku hadasho Soomaali, waaxda luqadaha, qaybta kaalmada adeegyada, corwin ferro . So [facility] [phone].    ATENCIÓN: Si habla español, tiene a powers disposición servicios gratuitos de asistencia lingüística. [facility] [phone].    We comply with applicable federal civil rights laws and [place] laws. We do not discriminate on the basis of race, color, national origin, age, disability, sex, sexual orientation, or gender identity.            Thank you!     Thank you for choosing [facility]  for your care. Our goal is always to provide you with excellent care. Hearing back from our patients is one way we can continue to improve our services. Please take a few minutes to complete the written survey that you may receive in the mail after your visit  with us. Thank you!             Your Updated Medication List - Protect others around you: Learn how to safely use, store and throw away your medicines at www.disposemymeds.org.          This list is accurate as of 4/30/18 10:18 AM.  Always use your most recent med list.                   Brand Name Dispense Instructions for use Diagnosis    aspirin 81 MG tablet     90 tablet    Take 81 mg by mouth as needed        atenolol-chlorthalidone 50-25 MG per tablet    TENORETIC    30 tablet    TAKE ONE TABLET BY MOUTH ONE TIME DAILY    Essential hypertension with goal blood pressure less than 140/90       famotidine 40 MG tablet    PEPCID    30 tablet    Take 1 tablet (40 mg) by mouth daily    H. pylori infection       HYDROcodone-acetaminophen 5-325 MG per tablet    NORCO    24 tablet    Take 1-2 tablets by mouth every 4 hours as needed for other (Moderate to Severe Pain)    Complex tear of medial meniscus of right knee as current injury, subsequent encounter, Chondromalacia of right knee       naproxen 500 MG tablet    NAPROSYN    30 tablet    Take 1 tablet (500 mg) by mouth 2 times daily as needed for moderate pain    Effusion of bursa of right knee       omeprazole 20 MG CR capsule    priLOSEC    90 capsule    Take 1 capsule (20 mg) by mouth daily    Gastroesophageal reflux disease without esophagitis       probiotic Caps     30 capsule    VSL Probiotic- Take 2 caps daily while on antibiotics    H. pylori infection

## 2018-04-30 NOTE — TELEPHONE ENCOUNTER
Routing to provider to review and advise on ongoing mychart messages regarding symptoms and OV on 4/23/18.    Melvina Gibson RN  [facility] Triage

## 2018-04-30 NOTE — PROGRESS NOTES
Subjective:  HPI                    Objective:  System    Physical Exam    General     ROS    Assessment/Plan:    SUBJECTIVE  Subjective changes as noted by pt:  Patient reports that he noticed a lump in his right groin on Saturday and felt bigger on Sunday. It did not hurt at all. He went to Urgent Care and was told he probably has a hernia. He is going to have a CT tomorrow for that. He didn't do his exercises Saturday and Sunday because he was afraid it might make the lump worse.       Current Pain level: 0/10   Changes in function:  Yes (See Goal flowsheet attached for changes in current functional level)     Adverse reaction to treatment or activity:  None    OBJECTIVE  Changes in objective findings:  Supine R knee AAROM: 0-5-135. Patient now ambulating with one crutch and improving gait, but still lacks full R knee extension on stance phase of gait.      ASSESSMENT  Thanh continues to require intervention to meet STG and LTG's: PT  Patient's symptoms are resolving.  Response to therapy has shown an improvement in  ROM , strength and function  Progress made towards STG/LTG?  Yes (See Goal flowsheet attached for updates on achievement of STG and LTG)    PLAN  Continue current treatment plan until patient demonstrates readiness to progress to higher level exercises. Re-enforced the need that patient needs to do extension stretching a lot at this point during the day.      PTA/ATC plan:  N/A    Please refer to the daily flowsheet for treatment today, total treatment time and time spent performing 1:1 timed codes.

## 2018-05-01 ENCOUNTER — OFFICE VISIT (OUTPATIENT)
Dept: ORTHOPEDICS | Facility: CLINIC | Age: 53
End: 2018-05-01
Payer: COMMERCIAL

## 2018-05-01 ENCOUNTER — RADIANT APPOINTMENT (OUTPATIENT)
Dept: CT IMAGING | Facility: CLINIC | Age: 53
End: 2018-05-01
Attending: FAMILY MEDICINE
Payer: COMMERCIAL

## 2018-05-01 VITALS
RESPIRATION RATE: 15 BRPM | WEIGHT: 174 LBS | BODY MASS INDEX: 27.97 KG/M2 | SYSTOLIC BLOOD PRESSURE: 108 MMHG | DIASTOLIC BLOOD PRESSURE: 75 MMHG | HEIGHT: 66 IN

## 2018-05-01 DIAGNOSIS — Z98.890 S/P ARTHROSCOPIC PARTIAL MEDIAL MENISCECTOMY: Primary | ICD-10-CM

## 2018-05-01 DIAGNOSIS — Z98.890 S/P ARTHROSCOPY OF RIGHT KNEE: ICD-10-CM

## 2018-05-01 DIAGNOSIS — K40.90 RIGHT INGUINAL HERNIA: ICD-10-CM

## 2018-05-01 LAB — RADIOLOGIST FLAGS: NORMAL

## 2018-05-01 PROCEDURE — 99024 POSTOP FOLLOW-UP VISIT: CPT | Performed by: ORTHOPAEDIC SURGERY

## 2018-05-01 PROCEDURE — 74177 CT ABD & PELVIS W/CONTRAST: CPT | Performed by: RADIOLOGY

## 2018-05-01 RX ORDER — IOPAMIDOL 755 MG/ML
107 INJECTION, SOLUTION INTRAVASCULAR ONCE
Status: COMPLETED | OUTPATIENT
Start: 2018-05-01 | End: 2018-05-01

## 2018-05-01 RX ADMIN — IOPAMIDOL 107 ML: 755 INJECTION, SOLUTION INTRAVASCULAR at 10:09

## 2018-05-01 NOTE — PROGRESS NOTES
Follow-up right knee arthroscopy with 30% partial medial meniscectomy, plica excision on 3/16/18. He has significant pain in knee.  He had significant swelling and  very limited range of motion.  He is making good progress with Physical Therapy.  He is still using a single cane.  Wounds are healing well.    No tenderness to palpation.  No warmth or erythema.  He has mild effusion.   There is slight pain with range of motion in extension .    He has moderate quadriceps weakness.  Range of motion: 5-135 degrees.    Assessment:  Right knee arthroscopy with partial medial meniscectomy with very slow progress..  We will continue Physical Therapy for range of motion, strengthening .  Wean from crutch.  Return to clinic 1 month if continued problems.

## 2018-05-01 NOTE — MR AVS SNAPSHOT
After Visit Summary   5/1/2018    Thanh Loaiza    MRN: [id number]           Patient Information     Date Of Birth          1965        Visit Information        Provider Department      5/1/2018 8:45 AM Dale Lester MD [facility]        Today's Diagnoses     S/P arthroscopic partial medial meniscectomy    -  1    S/P arthroscopy of right knee           Follow-ups after your visit        Your next 10 appointments already scheduled     May 03, 2018  9:00 AM CDT   SYDNEE Extremity with Suzi White, PT   [place] [facility] ([facility]  )    [address]  [facility] [address]   [phone]            May 10, 2018  8:20 AM CDT   SYDNEE Extremity with Suzi White PT   [facility] ([facility]  )    [address]  [facility] [address]   [phone]            May 17, 2018  9:00 AM CDT   SYDNEE Extremity with Suzi White PT   [facility] ([facility]  )    [address]  [facility] [address]   [phone]            May 24, 2018  8:20 AM CDT   SYDNEE Extremity with Suzi Whtie PT   [facility] ([facility]  )    [address] [facility] [address]   [phone]              Who to contact     If you have questions or need follow up information about today's clinic visit or your schedule please contact [facility] directly at [phone].  Normal or non-critical lab and imaging results will be communicated to you by MyChart, letter or phone within 4 business days after the clinic has received the results. If you do not hear from us within 7 days, please contact the clinic through MyChart or phone. If you have a critical or abnormal lab result, we will notify you by phone as soon as possible.  Submit refill requests through [employer] or call your pharmacy and  "they will forward the refill request to us. Please allow 3 business days for your refill to be completed.          Additional Information About Your Visit        [employer] Information     [employer] gives you secure access to your electronic health record. If you see a primary care provider, you can also send messages to your care team and make appointments. If you have questions, please call your primary care clinic.  If you do not have a primary care provider, please call [phone] and they will assist you.        Care EveryWhere ID     This is your Care EveryWhere ID. This could be used by other organizations to access your [place] medical records  [id number]        Your Vitals Were     Respirations Height BMI (Body Mass Index)             15 1.676 m (5' 6\") 28.08 kg/m2          Blood Pressure from Last 3 Encounters:   05/01/18 108/75   04/28/18 115/73   04/23/18 119/75    Weight from Last 3 Encounters:   05/01/18 78.9 kg (174 lb)   04/28/18 78.5 kg (173 lb)   04/23/18 79.7 kg (175 lb 9.6 oz)              Today, you had the following     No orders found for display       Primary Care Provider Office Phone # Fax #    Saravanan Bocanegra [phone]       [address]  [facility] [address]        Equal Access to Services     [facility] : Hadii aad ku hadasho Soomaali, waaxda luqadaha, qaybta kaalmada adeegyada, waxay idiin hayaan yg ferro . So [facility] [phone].    ATENCIÓN: Si habla español, tiene a powers disposición servicios gratuitos de asistencia lingüística. Llame al [phone].    We comply with applicable federal civil rights laws and [place] laws. We do not discriminate on the basis of race, color, national origin, age, disability, sex, sexual orientation, or gender identity.            Thank you!     Thank you for choosing [facility]  for your care. Our goal is always to provide you with excellent care. Hearing back from our patients is one way we can " continue to improve our services. Please take a few minutes to complete the written survey that you may receive in the mail after your visit with us. Thank you!             Your Updated Medication List - Protect others around you: Learn how to safely use, store and throw away your medicines at www.disposemymeds.org.          This list is accurate as of 5/1/18 11:59 AM.  Always use your most recent med list.                   Brand Name Dispense Instructions for use Diagnosis    aspirin 81 MG tablet     90 tablet    Take 81 mg by mouth as needed        atenolol-chlorthalidone 50-25 MG per tablet    TENORETIC    30 tablet    TAKE ONE TABLET BY MOUTH ONE TIME DAILY    Essential hypertension with goal blood pressure less than 140/90       famotidine 40 MG tablet    PEPCID    30 tablet    Take 1 tablet (40 mg) by mouth daily    H. pylori infection       HYDROcodone-acetaminophen 5-325 MG per tablet    NORCO    24 tablet    Take 1-2 tablets by mouth every 4 hours as needed for other (Moderate to Severe Pain)    Complex tear of medial meniscus of right knee as current injury, subsequent encounter, Chondromalacia of right knee       naproxen 500 MG tablet    NAPROSYN    30 tablet    Take 1 tablet (500 mg) by mouth 2 times daily as needed for moderate pain    Effusion of bursa of right knee       omeprazole 20 MG CR capsule    priLOSEC    90 capsule    Take 1 capsule (20 mg) by mouth daily    Gastroesophageal reflux disease without esophagitis       probiotic Caps     30 capsule    VSL Probiotic- Take 2 caps daily while on antibiotics    H. pylori infection

## 2018-05-01 NOTE — LETTER
5/1/2018         RE: Thanh Loaiza  [address]  [place] [address]        Dear Colleague,    Thank you for referring your patient, Thanh Loaiza, to [facility]. Please see a copy of my visit note below.    Follow-up right knee arthroscopy with 30% partial medial meniscectomy, plica excision on 3/16/18. He has significant pain in knee.  He had significant swelling and  very limited range of motion.  He is making good progress with Physical Therapy.  He is still using a single cane.  Wounds are healing well.    No tenderness to palpation.  No warmth or erythema.  He has mild effusion.   There is slight pain with range of motion in extension .    He has moderate quadriceps weakness.  Range of motion: 5-135 degrees.    Assessment:  Right knee arthroscopy with partial medial meniscectomy with very slow progress..  We will continue Physical Therapy for range of motion, strengthening .  Wean from crutch.  Return to clinic 1 month if continued problems.          Again, thank you for allowing me to participate in the care of your patient.        Sincerely,        Dale Lestre MD

## 2018-05-01 NOTE — NURSING NOTE
"Chief Complaint   Patient presents with     RECHECK     Follow-up for right knee arthroscopy 4/10/18.        Initial /75 (BP Location: Left arm)  Resp 15  Ht 1.676 m (5' 6\")  Wt 78.9 kg (174 lb)  BMI 28.08 kg/m2 Estimated body mass index is 28.08 kg/(m^2) as calculated from the following:    Height as of this encounter: 1.676 m (5' 6\").    Weight as of this encounter: 78.9 kg (174 lb).  Medication Reconciliation: complete     ANDREZ FrederickC  Supervising physician: Dale Lester MD  Dept. of Orthopedics  [facility]          "

## 2018-05-03 ENCOUNTER — OFFICE VISIT (OUTPATIENT)
Dept: UROLOGY | Facility: OTHER | Age: 53
End: 2018-05-03
Payer: COMMERCIAL

## 2018-05-03 VITALS
DIASTOLIC BLOOD PRESSURE: 77 MMHG | SYSTOLIC BLOOD PRESSURE: 115 MMHG | BODY MASS INDEX: 28.08 KG/M2 | WEIGHT: 174 LBS | HEART RATE: 79 BPM

## 2018-05-03 DIAGNOSIS — N32.89 BLADDER WALL THICKENING: ICD-10-CM

## 2018-05-03 DIAGNOSIS — K40.90 NON-RECURRENT UNILATERAL INGUINAL HERNIA WITHOUT OBSTRUCTION OR GANGRENE: Primary | ICD-10-CM

## 2018-05-03 PROCEDURE — 99243 OFF/OP CNSLTJ NEW/EST LOW 30: CPT | Mod: 25 | Performed by: UROLOGY

## 2018-05-03 PROCEDURE — 52000 CYSTOURETHROSCOPY: CPT | Performed by: UROLOGY

## 2018-05-03 ASSESSMENT — PAIN SCALES - GENERAL: PAINLEVEL: NO PAIN (0)

## 2018-05-03 NOTE — PATIENT INSTRUCTIONS
Please call [phone] to schedule your CT scan. It can be completed at [facility], [facility], or [facility]. We will contact you with results once Dr. Yip has reviewed them. Please call our office @ [phone] if you have any questions.

## 2018-05-03 NOTE — PROGRESS NOTES
Visit Date:   2018      SUBJECTIVE:  The patient is a pleasant 52-year-old gentleman who was requested to be seen by Dr. Jessica Salcedo for a consultation with regard to patient's abnormal CT scan.  The patient was seen initially for a right inguinal mass and a CT scan of abdomen and pelvis was performed.  In addition to the hernia that he has, there is some thickening of the bladder wall.  He has no problem with urination, denies any history of gross hematuria.  He is not a smoker.  No abnormal lymph nodes identified on the CT scan.      ASSESSMENT:  A 52-year-old gentleman with right inguinal hernia and also abnormal CT scan finding of the bladder wall.        Cystoscopy was performed today.  The patient was draped and prepped in the usual surgical fashion.  A cystoscope was placed directly into the bladder.  Urethra was unremarkable.  Prostate has minimal growth.  Within the bladder lumen everything looks completely normal.  No masses identified.      RECOMMENDATION:  Cysto is normal today.  Will repeat a CT scan in several months from now.         JONAH MAYES MD             D: 2018   T: 2018   MT: MASHA      Name:     AKIRA LOVELACE   MRN:      [id number]        Account:      [id number]   :      1965           Visit Date:   2018      Document: [id number]       cc: Jessica Salcedo MD

## 2018-05-03 NOTE — PROGRESS NOTES
S: See dictated note   Current Outpatient Prescriptions   Medication Sig Dispense Refill     aspirin 81 MG tablet Take 81 mg by mouth as needed  90 tablet 3     atenolol-chlorthalidone (TENORETIC) 50-25 MG per tablet TAKE ONE TABLET BY MOUTH ONE TIME DAILY  30 tablet 6     famotidine (PEPCID) 40 MG tablet Take 1 tablet (40 mg) by mouth daily 30 tablet 0     HYDROcodone-acetaminophen (NORCO) 5-325 MG per tablet Take 1-2 tablets by mouth every 4 hours as needed for other (Moderate to Severe Pain) 24 tablet 0     naproxen (NAPROSYN) 500 MG tablet Take 1 tablet (500 mg) by mouth 2 times daily as needed for moderate pain 30 tablet 0     omeprazole (PRILOSEC) 20 MG capsule Take 1 capsule (20 mg) by mouth daily 90 capsule 3     probiotic CAPS VSL Probiotic- Take 2 caps daily while on antibiotics 30 capsule 0     No Known Allergies  Past Medical History:   Diagnosis Date     DDD (degenerative disc disease), lumbar      DJD (degenerative joint disease), lumbar      Hypertension goal BP (blood pressure) < 140/90 12/14/2010     Past Surgical History:   Procedure Laterality Date     ARTHROSCOPY KNEE Right 3/16/2018    Procedure: ARTHROSCOPY KNEE;  Right knee arthroscopy, debridement ;  Surgeon: Dale Lester MD;  Location: [facility]     COLONOSCOPY  10/14/2011    Procedure:COLONOSCOPY; Colonoscopy, change in stools, diarrhea; Surgeon:ALEIDA DELEON; Location:[facility]     COLONOSCOPY  10/14/2011    Procedure:COMBINED COLONOSCOPY, SINGLE BIOPSY/POLYPECTOMY BY BIOPSY; Surgeon:ALEIDA DELEON; Location[facility]     ORTHOPEDIC SURGERY       TONSILLECTOMY      age 11 years      Family History   Problem Relation Age of Onset     Hypertension Mother      DIABETES Father      Hypertension Father      Hypertension Brother      Social History     Social History     Marital status:      Spouse name: N/A     Number of children: N/A     Years of education: N/A     Social History Main Topics     Smoking status: Never Smoker     Smokeless  tobacco: Never Used     Alcohol use No     Drug use: No     Sexual activity: Yes     Partners: Female     Other Topics Concern     Parent/Sibling W/ Cabg, Mi Or Angioplasty Before 65f 55m? No     Social History Narrative       REVIEW OF SYSTEMS  =================  C: NEGATIVE for fever, chills, change in weight  I: NEGATIVE for worrisome rashes, moles or lesions  E/M: NEGATIVE for ear, mouth and throat problems  R: NEGATIVE for significant cough or SHORTNESS OF BREATH  CV:  NEGATIVE for chest pain, palpitations or peripheral edema  GI: NEGATIVE for nausea, abdominal pain, heartburn, or change in bowel habits  NEURO: NEGATIVE numbness/weakness  : see HPI  PSYCH: NEGATIVE depression/anxiety  LYmph: no new enlarged lymph nodes  Ortho: no new trauma/movements      Physical Exam:  /77 (BP Location: Left arm, Patient Position: Sitting, Cuff Size: Adult Regular)  Pulse 79  Wt 78.9 kg (174 lb)  BMI 28.08 kg/m2   Patient is pleasant, in no acute distress, good general condition.  HEENT:  Normalcephalic, atraumatic  Lung: no evidence of respiratory distress    Abdomen: Soft, nondistended, non tender. No masses. No rebound or guarding.   Exam: penis no d/c. Testis no masses.  No scrotal skin lesion.  No cva tenderness  Skin: Warm and dry.  No redness.  Neuro: grossly normal  Psych normal mood and affect  Musculoskeletal  moving all extremities    Assessment/Plan:   See dictated note

## 2018-05-03 NOTE — MR AVS SNAPSHOT
After Visit Summary   5/3/2018    Thanh Loaiza    MRN: [id number]           Patient Information     Date Of Birth          1965        Visit Information        Provider Department      5/3/2018 9:15 AM Andry Yip MD [facility]        Today's Diagnoses     Inguinal hernia    -  1    Bladder wall thickening          Care Instructions    Please call [phone] to schedule your CT scan. It can be completed at [facility], [facility], or [facility]. We will contact you with results once Dr. Yip has reviewed them. Please call our office @ [phone] if you have any questions.            Follow-ups after your visit        Additional Services     GENERAL SURG ADULT REFERRAL       Your provider has referred you to: [facility]: [facility] [phone]   [email or web address]    Please see [facility] DO     Right Inguinal Hernia    Please be aware that coverage of these services is subject to the terms and limitations of your health insurance plan.  Call member services at your health plan with any benefit or coverage questions.      Please bring the following with you to your appointment:    (1) Any X-Rays, CTs or MRIs which have been performed.  Contact the facility where they were done to arrange for  prior to your scheduled appointment.   (2) List of current medications   (3) This referral request   (4) Any documents/labs given to you for this referral                  Your next 10 appointments already scheduled     May 10, 2018  8:20 AM CDT   SYDNEE Extremity with Suzi White PT   [facility]    [address]  [facility] [address]   [phone]            May 17, 2018  9:00 AM CDT   SYDNEE Extremity with Suzi White PT   [place] [facility] [facility] [place] ([facility] [place]  )    [address]  [place] [address]   [phone]            May 24, 2018  8:20 AM CDT   SYDNEE Extremity with Suzi White PT   [facility] [facility] [place] ([facility] [place]  )    [address]  [place] [address]   [phone]              Future tests that were ordered for you today     Open Future Orders        Priority Expected Expires Ordered    CT Abdomen Pelvis w/o & w Contrast [[id number]] Routine 8/3/2018 5/3/2019 5/3/2018            Who to contact     If you have questions or need follow up information about today's clinic visit or your schedule please contact [facility] directly at [phone].  Normal or non-critical lab and imaging results will be communicated to you by MyChart, letter or phone within 4 business days after the clinic has received the results. If you do not hear from us within 7 days, please contact the clinic through MyChart or phone. If you have a critical or abnormal lab result, we will notify you by phone as soon as possible.  Submit refill requests through [facility] or call your pharmacy and they will forward the refill request to us. Please allow 3 business days for your refill to be completed.          Additional Information About Your Visit        [employer] Information     [facility] gives you secure access to your electronic health record. If you see a primary care provider, you can also send messages to your care team and make appointments. If you have questions, please call your primary care clinic.  If you do not have a primary care provider, please call [phone] and they will assist you.        Care EveryWhere ID     This is your Care EveryWhere ID. This could be used by other organizations to access your [facility] medical records  [id number]        Your Vitals Were     Pulse BMI (Body Mass Index)                79 28.08 kg/m2           Blood Pressure from Last 3 Encounters:   05/03/18 115/77   05/01/18 108/75   04/28/18 115/73     Weight from Last 3 Encounters:   05/03/18 78.9 kg (174 lb)   05/01/18 78.9 kg (174 lb)   04/28/18 78.5 kg (173 lb)              We Performed the Following     GENERAL SURG ADULT REFERRAL        Primary Care Provider Office Phone # Fax #    Saravanan Bocanegra [phone]       [address]  [place] [facility] [address]        Equal Access to Services     [facility]: Hadii aad ku hadasho Soomaali, waaxda luqadaha, qaybta kaalmada adeegyada, waxay idiin hayaan adeeg kharash ladong . So [facility] [phone].    ATENCIÓN: Si habla español, tiene a powers disposición servicios gratuitos de asistencia lingüística. Llame al [phone].    We comply with applicable federal civil rights laws and [place] laws. We do not discriminate on the basis of race, color, national origin, age, disability, sex, sexual orientation, or gender identity.            Thank you!     Thank you for choosing [facility]  for your care. Our goal is always to provide you with excellent care. Hearing back from our patients is one way we can continue to improve our services. Please take a few minutes to complete the written survey that you may receive in the mail after your visit with us. Thank you!             Your Updated Medication List - Protect others around you: Learn how to safely use, store and throw away your medicines at www.disposemymeds.org.          This list is accurate as of 5/3/18  9:35 AM.  Always use your most recent med list.                   Brand Name Dispense Instructions for use Diagnosis    aspirin 81 MG tablet     90 tablet    Take 81 mg by mouth as needed        atenolol-chlorthalidone 50-25 MG per tablet    TENORETIC    30 tablet    TAKE ONE TABLET BY MOUTH ONE TIME DAILY    Essential hypertension with goal blood pressure less than 140/90       famotidine 40 MG tablet    PEPCID    30 tablet    Take 1 tablet (40 mg) by mouth daily    H. pylori infection       HYDROcodone-acetaminophen 5-325 MG per tablet     NORCO    24 tablet    Take 1-2 tablets by mouth every 4 hours as needed for other (Moderate to Severe Pain)    Complex tear of medial meniscus of right knee as current injury, subsequent encounter, Chondromalacia of right knee       naproxen 500 MG tablet    NAPROSYN    30 tablet    Take 1 tablet (500 mg) by mouth 2 times daily as needed for moderate pain    Effusion of bursa of right knee       omeprazole 20 MG CR capsule    priLOSEC    90 capsule    Take 1 capsule (20 mg) by mouth daily    Gastroesophageal reflux disease without esophagitis       probiotic Caps     30 capsule    VSL Probiotic- Take 2 caps daily while on antibiotics    H. pylori infection

## 2018-05-07 ENCOUNTER — OFFICE VISIT (OUTPATIENT)
Dept: SURGERY | Facility: CLINIC | Age: 53
End: 2018-05-07
Payer: COMMERCIAL

## 2018-05-07 VITALS
HEIGHT: 67 IN | BODY MASS INDEX: 27.55 KG/M2 | DIASTOLIC BLOOD PRESSURE: 77 MMHG | OXYGEN SATURATION: 97 % | HEART RATE: 73 BPM | SYSTOLIC BLOOD PRESSURE: 112 MMHG | WEIGHT: 175.5 LBS

## 2018-05-07 DIAGNOSIS — K40.90 RIGHT INGUINAL HERNIA: Primary | ICD-10-CM

## 2018-05-07 PROCEDURE — 99204 OFFICE O/P NEW MOD 45 MIN: CPT | Mod: 24 | Performed by: SURGERY

## 2018-05-07 ASSESSMENT — PAIN SCALES - GENERAL: PAINLEVEL: NO PAIN (0)

## 2018-05-07 NOTE — NURSING NOTE
Thanh Loaiza's goals for this visit include: hernia consult  He requests these members of his care team be copied on today's visit information:     PCP: Saravanan Bocanegra    Referring Provider:  Referred Self, MD  No address on file    [email or web address]@    Do you need any medication refills at today's visit? No    Rosalind Woods LPN

## 2018-05-07 NOTE — MR AVS SNAPSHOT
After Visit Summary   5/7/2018    Thanh Loaiza    MRN: [id number]           Patient Information     Date Of Birth          1965        Visit Information        Provider Department      5/7/2018 3:30 PM Caterina Guzman MD [facility]        Today's Diagnoses     Right inguinal hernia    -  1       Follow-ups after your visit        Your next 10 appointments already scheduled     May 10, 2018  8:20 AM CDT   SYDNEE Extremity with Suzi White, PT   [facility] ([facility]  )    [address] [facility] [address]   [phone]            May 17, 2018  9:00 AM CDT   SYDNEE Extremity with Suzi White, PT   [facility] ([facility]  )    [address] [facility] [address]   [phone]            May 24, 2018  8:20 AM CDT   SYDNEE Extremity with Suzi White, PT   [facility] ([facility]  )    [address] [facility] [address]   [phone]              Who to contact     If you have questions or need follow up information about today's clinic visit or your schedule please contact [facility] directly at [phone].  Normal or non-critical lab and imaging results will be communicated to you by MyChart, letter or phone within 4 business days after the clinic has received the results. If you do not hear from us within 7 days, please contact the clinic through [employer] or phone. If you have a critical or abnormal lab result, we will notify you by phone as soon as possible.  Submit refill requests through [facility] or call your pharmacy and they will forward the refill request to us. Please allow 3 business days for your refill to be completed.          Additional Information About Your Visit        [employer] Information     [facility] gives you secure access to your electronic health record. If you see a primary care  "provider, you can also send messages to your care team and make appointments. If you have questions, please call your primary care clinic.  If you do not have a primary care provider, please call [phone] and they will assist you.      [employer] is an electronic gateway that provides easy, online access to your medical records. With [employer], you can request a clinic appointment, read your test results, renew a prescription or communicate with your care team.     To access your existing account, please contact your [facility] or call [phone] for assistance.        Care EveryWhere ID     This is your Care EveryWhere ID. This could be used by other organizations to access your [facility] medical records  [id number]        Your Vitals Were     Pulse Height Pulse Oximetry BMI (Body Mass Index)          73 1.708 m (5' 7.25\") 97% 27.28 kg/m2         Blood Pressure from Last 3 Encounters:   05/07/18 112/77   05/03/18 115/77   05/01/18 108/75    Weight from Last 3 Encounters:   05/07/18 79.6 kg (175 lb 8 oz)   05/03/18 78.9 kg (174 lb)   05/01/18 78.9 kg (174 lb)              Today, you had the following     No orders found for display       Primary Care Provider Office Phone # Fax #    Saravanan Bocanegra [phone]       [address]  [facility] [address]        Equal Access to Services     [facility]: Hadii aad ku hadasho Soomaali, waaxda luqadaha, qaybta kaalmada adeegyada, corwin ferro . So [facility] [phone].    ATENCIÓN: Si habla español, tiene a powers disposición servicios gratuitos de asistencia lingüística. Llame al [phone].    We comply with applicable federal civil rights laws and [place] laws. We do not discriminate on the basis of race, color, national origin, age, disability, sex, sexual orientation, or gender identity.            Thank you!     Thank you for choosing [facility]  for your care. Our goal is " always to provide you with excellent care. Hearing back from our patients is one way we can continue to improve our services. Please take a few minutes to complete the written survey that you may receive in the mail after your visit with us. Thank you!             Your Updated Medication List - Protect others around you: Learn how to safely use, store and throw away your medicines at www.disposemymeds.org.          This list is accurate as of 5/7/18  3:33 PM.  Always use your most recent med list.                   Brand Name Dispense Instructions for use Diagnosis    aspirin 81 MG tablet     90 tablet    Take 81 mg by mouth as needed        atenolol-chlorthalidone 50-25 MG per tablet    TENORETIC    30 tablet    TAKE ONE TABLET BY MOUTH ONE TIME DAILY    Essential hypertension with goal blood pressure less than 140/90       famotidine 40 MG tablet    PEPCID    30 tablet    Take 1 tablet (40 mg) by mouth daily    H. pylori infection       HYDROcodone-acetaminophen 5-325 MG per tablet    NORCO    24 tablet    Take 1-2 tablets by mouth every 4 hours as needed for other (Moderate to Severe Pain)    Complex tear of medial meniscus of right knee as current injury, subsequent encounter, Chondromalacia of right knee       naproxen 500 MG tablet    NAPROSYN    30 tablet    Take 1 tablet (500 mg) by mouth 2 times daily as needed for moderate pain    Effusion of bursa of right knee       omeprazole 20 MG CR capsule    priLOSEC    90 capsule    Take 1 capsule (20 mg) by mouth daily    Gastroesophageal reflux disease without esophagitis       probiotic Caps     30 capsule    VSL Probiotic- Take 2 caps daily while on antibiotics    H. pylori infection

## 2018-05-07 NOTE — PROGRESS NOTES
Chief Complaint: right groin protrusion    History of Present Illness:   52 year old man sent in consultation by Jessica Salcedo MD for evaluation of a right groin protrusion which he first noted about 10 days ago. At that time, he noted no discomfort, and was concerned that this was in some way related to the the rehab for his right knee surgery. He went to urgent care, where a right inguinal hernia was diagnosed and a CT scan was also ordered. Since then he still notes that there is no pain or discomfort. He also notes that the hernia reduces completely on lying down, and is apparent when he is standing and engaging in activity.  He denies any instances of incarceration.       Past Medical History:   Diagnosis Date     DDD (degenerative disc disease), lumbar      DJD (degenerative joint disease), lumbar      Hypertension goal BP (blood pressure) < 140/90 12/14/2010     Past Surgical History:   Procedure Laterality Date     ARTHROSCOPY KNEE Right 3/16/2018    Procedure: ARTHROSCOPY KNEE;  Right knee arthroscopy, debridement ;  Surgeon: Dale Lester MD;  Location: [facility]     COLONOSCOPY  10/14/2011    Procedure:COLONOSCOPY; Colonoscopy, change in stools, diarrhea; Surgeon:ALEIDA DELEON; Location:[facility]     COLONOSCOPY  10/14/2011    Procedure:COMBINED COLONOSCOPY, SINGLE BIOPSY/POLYPECTOMY BY BIOPSY; Surgeon:ALEIDA DELEON; Location:[facility]     ORTHOPEDIC SURGERY       TONSILLECTOMY      age 11 years     Current Outpatient Prescriptions   Medication     aspirin 81 MG tablet     atenolol-chlorthalidone (TENORETIC) 50-25 MG per tablet     famotidine (PEPCID) 40 MG tablet     HYDROcodone-acetaminophen (NORCO) 5-325 MG per tablet     naproxen (NAPROSYN) 500 MG tablet     omeprazole (PRILOSEC) 20 MG capsule     probiotic CAPS     No current facility-administered medications for this visit.       No Known Allergies  Social History     Social History     Marital status:      Spouse name: N/A     Number of  "children: N/A     Years of education: N/A     Occupational History     Not on file.     Social History Main Topics     Smoking status: Never Smoker     Smokeless tobacco: Never Used     Alcohol use No     Drug use: No     Sexual activity: Yes     Partners: Female     Other Topics Concern     Parent/Sibling W/ Cabg, Mi Or Angioplasty Before 65f 55m? No     Social History Narrative     Family History   Problem Relation Age of Onset     Hypertension Mother      DIABETES Father      Hypertension Father      Hypertension Brother            Review of Systems:  No chest pain, dyspnea, weight loss, fevers or night sweats.   All other systems questioned and negative.     Exam:  Vital signs for exam: Pulse 73  Ht 1.708 m (5' 7.25\")  Wt 79.6 kg (175 lb 8 oz)  SpO2 97%  BMI 27.28 kg/m2  Constitutional: healthy, alert and no distress.  Head: Normocephalic. No masses, lesions, tenderness or abnormalities.  ENT: ENT exam normal, no neck nodes or sinus tenderness.  Cardiovascular: Normal S1, S2, no murmurs  Respiratory: Clear to auscultation.   Gastrointestinal:  Abdomen soft, non-tender. No masses, organomegaly.  : reducible right inguinal hernia, normal testicular exam  Musculoskeletal: extremities normal- uses single crutch on right arm due to recovery from knee surgery  Skin: no jaundice, rashes or petechiae.   Neurologic: normal fascial nerves, no gross sensorimotor abnormalities noted  Psychiatric: Mentation appears normal and affect normal.      Radiology:   CT: 1. Herniated fat within the right inguinal canal. No bowel is noted  within the hernia sac.  2. Irregular focal bladder wall thickening at the anterior right  aspect. Recommend urology consult and direct visualization to exclude  bladder carcinoma. No abnormal lymph nodes are seen.   3. Hepatic steatosis      IMPRESSION AND PLAN:  Asymptomatic right inguinal hernia. We discussed watchful waiting as well as laparoscopic and open inguinal hernia repair. He asked " about non mesh techniques which I told him that I was unaware of anyone in the [facility] with significant expertise in this. We discussed chronic pain rates, recurrence rates with mesh repair and non mesh repair and contralateral exploration.  I did recommend the [facility] clinic if he would like a non mesh inguinal hernia repair. He will consider his options and contact us. We also discussed the small risks of hernia incarceration.

## 2018-05-10 ENCOUNTER — THERAPY VISIT (OUTPATIENT)
Dept: PHYSICAL THERAPY | Facility: CLINIC | Age: 53
End: 2018-05-10
Payer: COMMERCIAL

## 2018-05-10 DIAGNOSIS — Z98.890 S/P ARTHROSCOPIC PARTIAL MEDIAL MENISCECTOMY: ICD-10-CM

## 2018-05-10 DIAGNOSIS — M25.561 RIGHT KNEE PAIN: ICD-10-CM

## 2018-05-10 PROCEDURE — 97530 THERAPEUTIC ACTIVITIES: CPT | Mod: GP | Performed by: PHYSICAL THERAPIST

## 2018-05-10 PROCEDURE — 97110 THERAPEUTIC EXERCISES: CPT | Mod: GP | Performed by: PHYSICAL THERAPIST

## 2018-05-10 NOTE — MR AVS SNAPSHOT
After Visit Summary   5/10/2018    Thanh Loaiza    MRN: [id number]           Patient Information     Date Of Birth          1965        Visit Information        Provider Department      5/10/2018 8:20 AM Suzi White, SARAHY [facility]        Today's Diagnoses     Right knee pain        S/P arthroscopic partial medial meniscectomy           Follow-ups after your visit        Your next 10 appointments already scheduled     May 17, 2018  9:00 AM CDT   SYDNEE Extremity with Suzi White PT   [facility] ([facility]  )    [address] [facility] [address]   [phone]            May 24, 2018  8:20 AM CDT   SYDNEE Extremity with Suzi White PT   [facility] ([facility]  )    [address]  [place] [address] [phone]              Who to contact     If you have questions or need follow up information about today's clinic visit or your schedule please contact [facility] directly at [phone].  Normal or non-critical lab and imaging results will be communicated to you by MyChart, letter or phone within 4 business days after the clinic has received the results. If you do not hear from us within 7 days, please contact the clinic through [employer] or phone. If you have a critical or abnormal lab result, we will notify you by phone as soon as possible.  Submit refill requests through [employer] or call your pharmacy and they will forward the refill request to us. Please allow 3 business days for your refill to be completed.          Additional Information About Your Visit        MyChart Information     [employer] gives you secure access to your electronic health record. If you see a primary care provider, you can also send messages to your care team and make appointments. If you have questions, please call your primary care  clinic.  If you do not have a primary care provider, please call [phone] and they will assist you.        Care EveryWhere ID     This is your Care EveryWhere ID. This could be used by other organizations to access your [facility] medical records  [id number]         Blood Pressure from Last 3 Encounters:   05/07/18 112/77   05/03/18 115/77   05/01/18 108/75    Weight from Last 3 Encounters:   05/07/18 79.6 kg (175 lb 8 oz)   05/03/18 78.9 kg (174 lb)   05/01/18 78.9 kg (174 lb)              We Performed the Following     Therapeutic Activities     Therapeutic Exercises        Primary Care Provider Office Phone # Fax #    Saravanan Bocanegra [phone]       [address]  [facility] [address]        Equal Access to Services     CAMPBELL FORD : Hadii aad ku hadasho Soomaali, waaxda luqadaha, qaybta kaalmada adeegyada, corwin rothman haymaryam ferro . So [facility] [phone].    ATENCIÓN: Si habla español, tiene a powers disposición servicios gratuitos de asistencia lingüística. [facility] [phone].    We comply with applicable federal civil rights laws and [place] laws. We do not discriminate on the basis of race, color, national origin, age, disability, sex, sexual orientation, or gender identity.            Thank you!     Thank you for choosing [facility]  for your care. Our goal is always to provide you with excellent care. Hearing back from our patients is one way we can continue to improve our services. Please take a few minutes to complete the written survey that you may receive in the mail after your visit with us. Thank you!             Your Updated Medication List - Protect others around you: Learn how to safely use, store and throw away your medicines at www.disposemymeds.org.          This list is accurate as of 5/10/18 11:59 PM.  Always use your most recent med list.                   Brand Name Dispense Instructions for use Diagnosis    aspirin 81  MG tablet     90 tablet    Take 81 mg by mouth as needed        atenolol-chlorthalidone 50-25 MG per tablet    TENORETIC    30 tablet    TAKE ONE TABLET BY MOUTH ONE TIME DAILY    Essential hypertension with goal blood pressure less than 140/90       famotidine 40 MG tablet    PEPCID    30 tablet    Take 1 tablet (40 mg) by mouth daily    H. pylori infection       HYDROcodone-acetaminophen 5-325 MG per tablet    NORCO    24 tablet    Take 1-2 tablets by mouth every 4 hours as needed for other (Moderate to Severe Pain)    Complex tear of medial meniscus of right knee as current injury, subsequent encounter, Chondromalacia of right knee       naproxen 500 MG tablet    NAPROSYN    30 tablet    Take 1 tablet (500 mg) by mouth 2 times daily as needed for moderate pain    Effusion of bursa of right knee       omeprazole 20 MG CR capsule    priLOSEC    90 capsule    Take 1 capsule (20 mg) by mouth daily    Gastroesophageal reflux disease without esophagitis       probiotic Caps     30 capsule    VSL Probiotic- Take 2 caps daily while on antibiotics    H. pylori infection

## 2018-05-10 NOTE — PROGRESS NOTES
Subjective:  HPI                    Objective:  System    Physical Exam    General     ROS    Assessment/Plan:    SUBJECTIVE  Subjective changes as noted by pt:  Patient reports that he saw the doctor and he thinks I am doing much better. He is 95% sure he will be going to [place] next weekend to have hernia surgery without mesh.       Current Pain level: 0/10   Changes in function:  Yes (See Goal flowsheet attached for changes in current functional level)     Adverse reaction to treatment or activity:  None    OBJECTIVE  Changes in objective findings: Harvey[ine R knee AAROM: 0-2-137. Patient ambulating with one crutch. Ambulating without crutch with minimal gait deviation. Improving quad control/strength.        ASSESSMENT  Thanh continues to require intervention to meet STG and LTG's: PT  Patient's symptoms are resolving.  Patient is progressing as expected.  Response to therapy has shown an improvement in  pain level, ROM , strength and function  Progress made towards STG/LTG?  Yes (See Goal flowsheet attached for updates on achievement of STG and LTG)    PLAN  Continue current treatment plan until patient demonstrates readiness to progress to higher level exercises.    PTA/ATC plan:  N/A    Please refer to the daily flowsheet for treatment today, total treatment time and time spent performing 1:1 timed codes.

## 2018-05-15 DIAGNOSIS — I10 ESSENTIAL HYPERTENSION WITH GOAL BLOOD PRESSURE LESS THAN 140/90: ICD-10-CM

## 2018-05-15 RX ORDER — ATENOLOL AND CHLORTHALIDONE TABLET 50; 25 MG/1; MG/1
TABLET ORAL
Qty: 30 TABLET | Refills: 11 | Status: SHIPPED | OUTPATIENT
Start: 2018-05-15 | End: 2018-12-27

## 2018-05-15 NOTE — TELEPHONE ENCOUNTER
"Requested Prescriptions   Pending Prescriptions Disp Refills     atenolol-chlorthalidone (TENORETIC) 50-25 MG per tablet [Pharmacy Med Name: Atenolol-Chlorthalidone Oral Tablet 50-25 MG]  Last Written Prescription Date:  09/19/17  Last Fill Quantity: 30,  # refills: 6   Last Office Visit with [facility] or [facility] prescribing provider:  04/23/18   Future Office Visit:    30 tablet 5     Sig: TAKE ONE TABLET BY MOUTH ONE TIME DAILY    Beta-Blockers Protocol Passed    5/15/2018  7:01 AM       Passed - Blood pressure under 140/90 in past 12 months    BP Readings from Last 3 Encounters:   05/07/18 112/77   05/03/18 115/77   05/01/18 108/75                Passed - Patient is age 6 or older       Passed - Recent (12 mo) or future (30 days) visit within the authorizing provider's specialty    Patient had office visit in the last 12 months or has a visit in the next 30 days with authorizing provider or within the authorizing provider's specialty.  See \"Patient Info\" tab in inbasket, or \"Choose Columns\" in Meds & Orders section of the refill encounter.              "

## 2018-05-31 ENCOUNTER — OFFICE VISIT (OUTPATIENT)
Dept: FAMILY MEDICINE | Facility: CLINIC | Age: 53
End: 2018-05-31
Payer: COMMERCIAL

## 2018-05-31 VITALS
BODY MASS INDEX: 27.97 KG/M2 | HEIGHT: 67 IN | SYSTOLIC BLOOD PRESSURE: 111 MMHG | WEIGHT: 178.2 LBS | DIASTOLIC BLOOD PRESSURE: 74 MMHG | HEART RATE: 69 BPM | TEMPERATURE: 98.2 F | OXYGEN SATURATION: 96 %

## 2018-05-31 DIAGNOSIS — Z51.89 VISIT FOR WOUND CHECK: ICD-10-CM

## 2018-05-31 DIAGNOSIS — E87.6 HYPOPOTASSEMIA: Primary | ICD-10-CM

## 2018-05-31 LAB
ANION GAP SERPL CALCULATED.3IONS-SCNC: 10 MMOL/L (ref 3–14)
BUN SERPL-MCNC: 15 MG/DL (ref 7–30)
CALCIUM SERPL-MCNC: 9.5 MG/DL (ref 8.5–10.1)
CHLORIDE SERPL-SCNC: 103 MMOL/L (ref 94–109)
CO2 SERPL-SCNC: 28 MMOL/L (ref 20–32)
CREAT SERPL-MCNC: 0.96 MG/DL (ref 0.66–1.25)
GFR SERPL CREATININE-BSD FRML MDRD: 82 ML/MIN/1.7M2
GLUCOSE SERPL-MCNC: 107 MG/DL (ref 70–99)
POTASSIUM SERPL-SCNC: 3.2 MMOL/L (ref 3.4–5.3)
SODIUM SERPL-SCNC: 141 MMOL/L (ref 133–144)

## 2018-05-31 PROCEDURE — 80048 BASIC METABOLIC PNL TOTAL CA: CPT | Performed by: PHYSICIAN ASSISTANT

## 2018-05-31 PROCEDURE — 36415 COLL VENOUS BLD VENIPUNCTURE: CPT | Performed by: PHYSICIAN ASSISTANT

## 2018-05-31 PROCEDURE — 99213 OFFICE O/P EST LOW 20 MIN: CPT | Performed by: PHYSICIAN ASSISTANT

## 2018-05-31 NOTE — MR AVS SNAPSHOT
After Visit Summary   5/31/2018    Thanh Loaiza    MRN: [id number]           Patient Information     Date Of Birth          1965        Visit Information        Provider Department      5/31/2018 4:40 PM Ernesto Holland PA [facility]        Today's Diagnoses     Hypopotassemia    -  1    Visit for wound check          Care Instructions    At [facility], we strive to deliver an exceptional experience to you, every time we see you.  If you receive a survey in the mail, please send us back your thoughts. We really do value your feedback.    Based on your medical history, these are the current health maintenance/preventive care services that you are due for (some may have been done at this visit.)  Health Maintenance Due   Topic Date Due     HIV SCREEN (SYSTEM ASSIGNED)  08/27/1981         Suggested websites for health information:  Www.[employer] : Up to date and easily searchable information on multiple topics.  Www.[place].gov : medication info, interactive tutorials, watch real surgeries online  Www.familydoctor.org : good info from the Academy of Family Physicians  Www.cdc.gov : public health info, travel advisories, epidemics (H1N1)  Www.aap.org : children's health info, normal development, vaccinations  Www.health.[facility].[place].us : [place] dept of health, public health issues in [place], N1N1    Your care team:                            Family Medicine Internal Medicine   MD Alexy Giraldo MD Shantel Branch-Fleming, MD Katya Georgiev PA-C Nam Ho, MD Pediatrics   GOPI Gale, MD Martha Cuellar CNP, MD Deborah Mielke, MD Kim Thein, APRN CNP      Clinic hours: Monday - Thursday 7 am-7 pm; Fridays 7 am-5 pm.   Urgent care: Monday - Friday 11 am-9 pm; Saturday and Sunday 9 am-5 pm.  Pharmacy : Monday -Thursday 8 am-8 pm; Friday 8 am-6 pm; Saturday and Sunday 9  am-5 pm.     Clinic: [phone]   Pharmacy: [phone]    Wound Care  Taking proper care of your wound will help it heal. Your healthcare provider may show you how to clean and dress the wound. He or she will also explain how to tell if the wound is healing normally. If you are unsure of how to take care of the wound, be sure to clarify what dressing to use and how often you should change the bandages. Here are the basic steps.     A wound that's not healing normally may be dark in color or have white streaks.   Wash your hands  Tips for washing your hands include:    Use liquid soap and lather for 2 minutes. Scrub between your fingers and under your nails.    Rinse with warm water, keeping your fingers pointing down.    Use a paper towel to dry your hands and to turn off the faucet.  Remove the used dressing  Here are suggestions for removing the dressing:    If dressing changes cause you pain, be sure to take your pain medicine as prescribed by your healthcare provider 30 minutes before dressing changes.    Set up your supplies.    Put on disposable gloves if you re dressing a wound for someone else or your wound is infected.    Loosen the tape by pulling gently toward the wound.    Gently take off the old dressing. If the dressing is stuck to the wound, moisten it with saline (if available) or clean water.    If you have a drain or tube in the wound, be careful not to pull on it.    Remove the dressing 1 layer at a time and put it in a plastic bag. Seal the bag and put it in the trash.    Remove your gloves.  Inspect and dress the wound  Check the wound carefully:    Each time you change the dressing, check the wound carefully to be sure it s healing normally by making sure your wound appears to be pink and moist, and is free of infection.      Wash your hands again. Put on a new pair of gloves.    Clean and dress the wound as directed by your healthcare provider or nurse. Don't put anything in the wound  that is not prescribed or directed by your healthcare provider. If you have a drain or tube, be careful not to pull on it. Make sure to secure the drain or tube as well.    Put all unused supplies in a clean plastic bag. Seal the bag and store it in a clean, dry area between dressing changes.     Be sure to wash your hands again.  Call your healthcare provider  Call your healthcare provider if you see any of the following signs of a problem:    Bleeding that soaks the dressing    Pink fluid weeping from the wound    Increased drainage or drainage that is yellow, yellow-green, or foul-smelling    Increased swelling or pain, or redness or swelling in the skin around the wound    A change in the color of the wound, or if streaks develop in a direction away from the wound    The area between any stitches opens up    An increase in the size of the wound    A fever of 100.4 F (38 C) or higher, or as directed by your healthcare provider    Chills, increased fatigue, or a loss of appetite      Date Last Reviewed: 7/1/2017 2000-2017 [employer]. [address] [place] [address]. All rights reserved. This information is not intended as a substitute for professional medical care. Always follow your healthcare professional's instructions.                Follow-ups after your visit        Additional Services     GENERAL SURG ADULT REFERRAL       Your provider has referred you to: [facility]: [facility] [phone]   [email or web address]  [facility]: General Surgery [facility] [phone]   [email or web address]    Please be aware that coverage of these services is subject to the terms and limitations of your health insurance plan.  Call member services at your health plan with any benefit or coverage questions.      Please bring the following with you to your appointment:    (1) Any X-Rays, CTs or MRIs  which have been performed.  Contact the facility where they were done to arrange for  prior to your scheduled appointment.   (2) List of current medications   (3) This referral request   (4) Any documents/labs given to you for this referral                  Follow-up notes from your care team     Return if symptoms worsen or fail to improve.      Your next 10 appointments already scheduled     Jun 04, 2018  8:20 AM CDT   SYDNEE Extremity with Suzi White PT   [facility] (SYDNEE Lindy Puri  )    [address]  [facility] [address]   [phone]            Jun 04, 2018  4:00 PM CDT   Return Visit with Dale Lester MD   [facility]    [address]  [place] [address] [phone]              Who to contact     If you have questions or need follow up information about today's clinic visit or your schedule please contact [facility] [place] directly at [phone].  Normal or non-critical lab and imaging results will be communicated to you by [employer], letter or phone within 4 business days after the clinic has received the results. If you do not hear from us within 7 days, please contact the clinic through [employer] or phone. If you have a critical or abnormal lab result, we will notify you by phone as soon as possible.  Submit refill requests through [employer] or call your pharmacy and they will forward the refill request to us. Please allow 3 business days for your refill to be completed.          Additional Information About Your Visit        [employer] Information     [employer] gives you secure access to your electronic health record. If you see a primary care provider, you can also send messages to your care team and make appointments. If you have questions, please call your primary care clinic.  If you do not have a primary care provider, please call [phone] and they will assist you.       "  Care EveryWhere ID     This is your Care EveryWhere ID. This could be used by other organizations to access your [facility] medical records  [id number]        Your Vitals Were     Pulse Temperature Height Pulse Oximetry BMI (Body Mass Index)       69 98.2  F (36.8  C) (Oral) 5' 7.25\" (1.708 m) 96% 27.7 kg/m2        Blood Pressure from Last 3 Encounters:   05/31/18 111/74   05/07/18 112/77   05/03/18 115/77    Weight from Last 3 Encounters:   05/31/18 178 lb 3.2 oz (80.8 kg)   05/07/18 175 lb 8 oz (79.6 kg)   05/03/18 174 lb (78.9 kg)              We Performed the Following     Basic metabolic panel     GENERAL SURG ADULT REFERRAL        Primary Care Provider Office Phone # Fax #    Saravanan Bocanegra [phone]       [address] [facility] [address]        Equal Access to Services     [facility]: Hadii aad ku hadasho Soomaali, waaxda luqadaha, qaybta kaalmada adeegyada, waxay idiin hayaan yg ayoubarailya ladong . So [facility] [phone].    ATENCIÓN: Si habla español, tiene a powers disposición servicios gratuitos de asistencia lingüística. Llame al [phone].    We comply with applicable federal civil rights laws and [place] laws. We do not discriminate on the basis of race, color, national origin, age, disability, sex, sexual orientation, or gender identity.            Thank you!     Thank you for choosing [facility]  for your care. Our goal is always to provide you with excellent care. Hearing back from our patients is one way we can continue to improve our services. Please take a few minutes to complete the written survey that you may receive in the mail after your visit with us. Thank you!             Your Updated Medication List - Protect others around you: Learn how to safely use, store and throw away your medicines at www.disposemymeds.org.          This list is accurate as of 5/31/18  5:17 PM.  Always use your most recent med list.                   Brand Name Dispense " Instructions for use Diagnosis    aspirin 81 MG tablet     90 tablet    Take 81 mg by mouth as needed        atenolol-chlorthalidone 50-25 MG per tablet    TENORETIC    30 tablet    TAKE ONE TABLET BY MOUTH ONE TIME DAILY    Essential hypertension with goal blood pressure less than 140/90

## 2018-05-31 NOTE — LETTER
June 1, 2018      Thanh Loaiza  [address] [facility] [address]  [place] [address]        Dear Thanh Loaiza    Your potassium was 3.2, which is a little low but basically normal for you; All your other results over the past year have been 3.3 or 3.2, so I think this is fine for you.  If you wanted we could add a low dose potassium supplement?       Please contact the clinic if you have additional questions or if symptoms persist.  Thank you.      Enclosed is a copy of the results.  It was a pleasure to see you at your last appointment.      Sincerely,      Xavier Holland PA-C/N. SABINA Nolasco      Results for orders placed or performed in visit on 05/31/18   Basic metabolic panel   Result Value Ref Range    Sodium 141 133 - 144 mmol/L    Potassium 3.2 (L) 3.4 - 5.3 mmol/L    Chloride 103 94 - 109 mmol/L    Carbon Dioxide 28 20 - 32 mmol/L    Anion Gap 10 3 - 14 mmol/L    Glucose 107 (H) 70 - 99 mg/dL    Urea Nitrogen 15 7 - 30 mg/dL    Creatinine 0.96 0.66 - 1.25 mg/dL    GFR Estimate 82 >60 mL/min/1.7m2    GFR Estimate If Black >90 >60 mL/min/1.7m2    Calcium 9.5 8.5 - 10.1 mg/dL

## 2018-05-31 NOTE — PATIENT INSTRUCTIONS
At [facility], we strive to deliver an exceptional experience to you, every time we see you.  If you receive a survey in the mail, please send us back your thoughts. We really do value your feedback.    Based on your medical history, these are the current health maintenance/preventive care services that you are due for (some may have been done at this visit.)  Health Maintenance Due   Topic Date Due     HIV SCREEN (SYSTEM ASSIGNED)  08/27/1981         Suggested websites for health information:  Www.[employer].Cellmemore : Up to date and easily searchable information on multiple topics.  Www.[employer].gov : medication info, interactive tutorials, watch real surgeries online  Www.familydoctor.org : good info from the Academy of Family Physicians  Www.cdc.gov : public health info, travel advisories, epidemics (H1N1)  Www.aap.org : children's health info, normal development, vaccinations  Www.health.[facility].[place].us : [place] dept of health, public health issues in [place], N1N1    Your care team:                            Family Medicine Internal Medicine   MD Alexy Giraldo MD Shantel Branch-Fleming, MD Katya Georgiev PA-C Nam Ho, MD Pediatrics   GOPI Gale, CNP Maria R LYN CNP   MD Martha Garcia MD Deborah Mielke, MD Kim Thein, APRN CNP      Clinic hours: Monday - Thursday 7 am-7 pm; Fridays 7 am-5 pm.   Urgent care: Monday - Friday 11 am-9 pm; Saturday and Sunday 9 am-5 pm.  Pharmacy : Monday -Thursday 8 am-8 pm; Friday 8 am-6 pm; Saturday and Sunday 9 am-5 pm.     Clinic: [phone]   Pharmacy: [phone]    Wound Care  Taking proper care of your wound will help it heal. Your healthcare provider may show you how to clean and dress the wound. He or she will also explain how to tell if the wound is healing normally. If you are unsure of how to take care of the wound, be sure to clarify what dressing to use and how often you should  change the bandages. Here are the basic steps.     A wound that's not healing normally may be dark in color or have white streaks.   Wash your hands  Tips for washing your hands include:    Use liquid soap and lather for 2 minutes. Scrub between your fingers and under your nails.    Rinse with warm water, keeping your fingers pointing down.    Use a paper towel to dry your hands and to turn off the faucet.  Remove the used dressing  Here are suggestions for removing the dressing:    If dressing changes cause you pain, be sure to take your pain medicine as prescribed by your healthcare provider 30 minutes before dressing changes.    Set up your supplies.    Put on disposable gloves if you re dressing a wound for someone else or your wound is infected.    Loosen the tape by pulling gently toward the wound.    Gently take off the old dressing. If the dressing is stuck to the wound, moisten it with saline (if available) or clean water.    If you have a drain or tube in the wound, be careful not to pull on it.    Remove the dressing 1 layer at a time and put it in a plastic bag. Seal the bag and put it in the trash.    Remove your gloves.  Inspect and dress the wound  Check the wound carefully:    Each time you change the dressing, check the wound carefully to be sure it s healing normally by making sure your wound appears to be pink and moist, and is free of infection.      Wash your hands again. Put on a new pair of gloves.    Clean and dress the wound as directed by your healthcare provider or nurse. Don't put anything in the wound that is not prescribed or directed by your healthcare provider. If you have a drain or tube, be careful not to pull on it. Make sure to secure the drain or tube as well.    Put all unused supplies in a clean plastic bag. Seal the bag and store it in a clean, dry area between dressing changes.     Be sure to wash your hands again.  Call your healthcare provider  Call your healthcare provider  if you see any of the following signs of a problem:    Bleeding that soaks the dressing    Pink fluid weeping from the wound    Increased drainage or drainage that is yellow, yellow-green, or foul-smelling    Increased swelling or pain, or redness or swelling in the skin around the wound    A change in the color of the wound, or if streaks develop in a direction away from the wound    The area between any stitches opens up    An increase in the size of the wound    A fever of 100.4 F (38 C) or higher, or as directed by your healthcare provider    Chills, increased fatigue, or a loss of appetite      Date Last Reviewed: 7/1/2017 2000-2017 [employer]. [address] [place] [address], [place] [address]. All rights reserved. This information is not intended as a substitute for professional medical care. Always follow your healthcare professional's instructions.

## 2018-05-31 NOTE — PROGRESS NOTES
"  SUBJECTIVE:   Thanh Loaiza is a 52 year old male who presents to clinic today for the following health issues:      Lab check for potassium and wound on right side abdomen s/p nonmesh inguinal hernia repair.  See mychary messages.         No fevers, normal BMs      No Known Allergies    Past Medical History:   Diagnosis Date     DDD (degenerative disc disease), lumbar      DJD (degenerative joint disease), lumbar      Hypertension goal BP (blood pressure) < 140/90 12/14/2010         Current Outpatient Prescriptions on File Prior to Visit:  aspirin 81 MG tablet Take 81 mg by mouth as needed    atenolol-chlorthalidone (TENORETIC) 50-25 MG per tablet TAKE ONE TABLET BY MOUTH ONE TIME DAILY     No current facility-administered medications on file prior to visit.     Social History   Substance Use Topics     Smoking status: Never Smoker     Smokeless tobacco: Never Used     Alcohol use No       ROS:  General: negative for fever  SKIN: + as above  CARDIO hx htn    Physcial Exam:  /74 (BP Location: Left arm, Patient Position: Chair, Cuff Size: Adult Regular)  Pulse 69  Temp 98.2  F (36.8  C) (Oral)  Ht 5' 7.25\" (1.708 m)  Wt 178 lb 3.2 oz (80.8 kg)  SpO2 96%  BMI 27.7 kg/m2    GENERAL: alert, no acute distress  EYES: conjunctival clear  RESP: Regular breathing rate  NEURO: awake .  SKIN: Rt inguinal:   there is a well healed sx scar-it is w/o erythem a, drainage or TTP the lateral aspect has some induration w/o fluctuance.    ASSESSMENT: wound healing well though possibly more induration than expected, but it has only been two weeks. He will wait and see and f/u gen sx as needed/.      ICD-10-CM    1. Hypopotassemia E87.6 Basic metabolic panel   2. Visit for wound check Z51.89 GENERAL SURG ADULT REFERRAL       PLAN: we discussed removing the chlorthalidone pending labs today and possibly in the future once he can start exercising again (knee issues).  See today's orders.  Follow-up with primary clinic if not " improving.  Advised about symptoms which might herald more serious problems.

## 2018-06-01 ENCOUNTER — MYC MEDICAL ADVICE (OUTPATIENT)
Dept: FAMILY MEDICINE | Facility: CLINIC | Age: 53
End: 2018-06-01

## 2018-06-01 DIAGNOSIS — I10 ESSENTIAL HYPERTENSION: Primary | ICD-10-CM

## 2018-06-01 NOTE — PROGRESS NOTES
Mr. Loaiza,    Your potassium was 3.2, which is a little low but basically normal for you; All your other results over the past year have been 3.3 or 3.2, so I think this is fine for you.  If you wanted we could add a low dose potassium supplement?      Please contact the clinic if you have additional questions or if symptoms persist.  Thank you.    Sincerely,    Ernesto Holland

## 2018-06-04 ENCOUNTER — THERAPY VISIT (OUTPATIENT)
Dept: PHYSICAL THERAPY | Facility: CLINIC | Age: 53
End: 2018-06-04
Payer: COMMERCIAL

## 2018-06-04 DIAGNOSIS — Z98.890 S/P ARTHROSCOPIC PARTIAL MEDIAL MENISCECTOMY: ICD-10-CM

## 2018-06-04 DIAGNOSIS — M25.561 RIGHT KNEE PAIN: ICD-10-CM

## 2018-06-04 PROCEDURE — 97530 THERAPEUTIC ACTIVITIES: CPT | Mod: GP | Performed by: PHYSICAL THERAPIST

## 2018-06-04 PROCEDURE — 97110 THERAPEUTIC EXERCISES: CPT | Mod: GP | Performed by: PHYSICAL THERAPIST

## 2018-06-04 RX ORDER — POTASSIUM CHLORIDE 750 MG/1
10 TABLET, EXTENDED RELEASE ORAL DAILY
Qty: 30 TABLET | Refills: 1 | Status: SHIPPED | OUTPATIENT
Start: 2018-06-04 | End: 2018-08-06

## 2018-06-04 ASSESSMENT — ACTIVITIES OF DAILY LIVING (ADL)
STAND: ACTIVITY IS MINIMALLY DIFFICULT
KNEEL ON THE FRONT OF YOUR KNEE: ACTIVITY IS FAIRLY DIFFICULT
PAIN: THE SYMPTOM AFFECTS MY ACTIVITY SLIGHTLY
WALK: ACTIVITY IS SOMEWHAT DIFFICULT
HOW_WOULD_YOU_RATE_THE_OVERALL_FUNCTION_OF_YOUR_KNEE_DURING_YOUR_USUAL_DAILY_ACTIVITIES?: ABNORMAL
HOW_WOULD_YOU_RATE_THE_CURRENT_FUNCTION_OF_YOUR_KNEE_DURING_YOUR_USUAL_DAILY_ACTIVITIES_ON_A_SCALE_FROM_0_TO_100_WITH_100_BEING_YOUR_LEVEL_OF_KNEE_FUNCTION_PRIOR_TO_YOUR_INJURY_AND_0_BEING_THE_INABILITY_TO_PERFORM_ANY_OF_YOUR_USUAL_DAILY_ACTIVITIES?: 50
GIVING WAY, BUCKLING OR SHIFTING OF KNEE: THE SYMPTOM AFFECTS MY ACTIVITY SLIGHTLY
SWELLING: THE SYMPTOM AFFECTS MY ACTIVITY SLIGHTLY
SQUAT: ACTIVITY IS FAIRLY DIFFICULT
SIT WITH YOUR KNEE BENT: ACTIVITY IS MINIMALLY DIFFICULT
KNEE_ACTIVITY_OF_DAILY_LIVING_SUM: 43
RISE FROM A CHAIR: ACTIVITY IS MINIMALLY DIFFICULT
RAW_SCORE: 43
AS_A_RESULT_OF_YOUR_KNEE_INJURY,_HOW_WOULD_YOU_RATE_YOUR_CURRENT_LEVEL_OF_DAILY_ACTIVITY?: ABNORMAL
GO UP STAIRS: ACTIVITY IS SOMEWHAT DIFFICULT
STIFFNESS: THE SYMPTOM AFFECTS MY ACTIVITY SLIGHTLY
GO DOWN STAIRS: ACTIVITY IS SOMEWHAT DIFFICULT
LIMPING: THE SYMPTOM AFFECTS MY ACTIVITY SLIGHTLY
KNEE_ACTIVITY_OF_DAILY_LIVING_SCORE: 61.43
WEAKNESS: THE SYMPTOM AFFECTS MY ACTIVITY SLIGHTLY

## 2018-06-04 NOTE — PROGRESS NOTES
Subjective:  HPI                    Objective:  System    Physical Exam    General     ROS    Assessment/Plan:    PROGRESS  REPORT    Progress reporting period is from 4-23-18 to 6-4-18.       SUBJECTIVE  Subjective changes noted by patient:  Patient reports that he had hernia surgery on 5-16-18 in [facility] and quit doing all his exercises. His pain and swelling got worse about a week ago and he had to start using his crutch again. He did a lot of icing which helped the knee. He just starting doing his exercises again 3 days ago.          Current Pain level: 0/10.     Previous pain level was  0/10  .   Changes in function:  Yes (See Goal flowsheet attached for changes in current functional level)  Adverse reaction to treatment or activity: None    OBJECTIVE  Changes noted in objective findings:  Supine R knee AAROM: 0-3-132. This is slightly worse than on last visit of 5-10-18. R knee extension 4- to 4/5. Is able to ambulate with no crutches, but still lacks full R knee extension on stance phase.         ASSESSMENT/PLAN  Updated problem list and treatment plan: Diagnosis 1:  S/p R partial menisectomy  Pain -  hot/cold therapy, manual therapy, self management, education, directional preference exercise and home program  Decreased ROM/flexibility - manual therapy, therapeutic exercise, therapeutic activity and home program  Decreased strength - therapeutic exercise, therapeutic activities and home program  Impaired balance - neuro re-education, gait training, therapeutic activities and home program  Decreased proprioception - neuro re-education, gait training, therapeutic activities and home program  Edema - cold therapy and self management/home program  Impaired gait - gait training and home program  Impaired muscle performance - neuro re-education and home program  Decreased function - therapeutic activities and home program  STG/LTGs have been met or progress has been made towards goals:  Yes (See Goal flow sheet  completed today.)  Assessment of Progress: Patient condition unchanged from last therapy session about 3 1/2 weeks ago due to hernia surgery.   Self Management Plans:  Patient has been instructed in a home treatment program.  Patient  has been instructed in self management of symptoms.    Thanh continues to require the following intervention to meet STG and LTG's:  PT    Recommendations:  This patient would benefit from continued therapy.     Frequency:  1 X week, once daily  Duration:  for 8 weeks        Please refer to the daily flowsheet for treatment today, total treatment time and time spent performing 1:1 timed codes.

## 2018-06-04 NOTE — MR AVS SNAPSHOT
After Visit Summary   6/4/2018    Thanh Loaiza    MRN: [id number]           Patient Information     Date Of Birth          1965        Visit Information        Provider Department      6/4/2018 8:20 AM Suzi White, PT [facility]        Today's Diagnoses     Right knee pain        S/P arthroscopic partial medial meniscectomy           Follow-ups after your visit        Your next 10 appointments already scheduled     Jun 04, 2018  4:00 PM CDT   Return Visit with Dale Lester MD   [facility] ([facility]    [address] [facility] [address]   [phone]            Jun 11, 2018  4:50 PM CDT   [facility] Extremity with Suzi White PT   [facility] ([facility] [place]  )    [address]  [place] [address]   [phone]            Jun 19, 2018  8:10 AM CDT   SYDNEE Extremity with Suzi White PT   [facility] ([facility] [place]  )    [address]  [place] [address] [phone]            Jun 26, 2018  8:10 AM CDT   SYDNEE Extremity with Suzi White PT   [facility] ([facility] [place]  )    [address]  [place] [address] [phone]              Who to contact     If you have questions or need follow up information about today's clinic visit or your schedule please contact [facility] directly at [phone].  Normal or non-critical lab and imaging results will be communicated to you by MyChart, letter or phone within 4 business days after the clinic has received the results. If you do not hear from us within 7 days, please contact the clinic through MyChart or phone. If you have a critical or abnormal lab result, we will notify you by phone as soon as possible.  Submit refill requests through [employer] or call your pharmacy and they  will forward the refill request to us. Please allow 3 business days for your refill to be completed.          Additional Information About Your Visit        H[employer] Information     [employer] software gives you secure access to your electronic health record. If you see a primary care provider, you can also send messages to your care team and make appointments. If you have questions, please call your primary care clinic.  If you do not have a primary care provider, please call [phone] and they will assist you.        Care EveryWhere ID     This is your Care EveryWhere ID. This could be used by other organizations to access your [facility] medical records  [id number]         Blood Pressure from Last 3 Encounters:   05/31/18 111/74   05/07/18 112/77   05/03/18 115/77    Weight from Last 3 Encounters:   05/31/18 80.8 kg (178 lb 3.2 oz)   05/07/18 79.6 kg (175 lb 8 oz)   05/03/18 78.9 kg (174 lb)              We Performed the Following     SYDNEE Progress Notes Report     Therapeutic Activities     Therapeutic Exercises        Primary Care Provider Office Phone # Fax #    Saravanan Bocanegra [phone]       [address] [place]  [facility] [address]        Equal Access to Services     KHUSHBU FORD AH: Hadii aad ku hadasho Soomaali, waaxda luqadaha, qaybta kaalmada adeegyada, corwin rolon. So [facility] [phone].    ATENCIÓN: Si habla español, tiene a powers disposición servicios gratuitos de asistencia lingüística. Lorene al [phone].    We comply with applicable federal civil rights laws and [place] laws. We do not discriminate on the basis of race, color, national origin, age, disability, sex, sexual orientation, or gender identity.            Thank you!     Thank you for choosing [facility]  for your care. Our goal is always to provide you with excellent care. Hearing back from our patients is one way we can continue to improve our services. Please take a few  minutes to complete the written survey that you may receive in the mail after your visit with us. Thank you!             Your Updated Medication List - Protect others around you: Learn how to safely use, store and throw away your medicines at www.disposemymeds.org.          This list is accurate as of 6/4/18 11:32 AM.  Always use your most recent med list.                   Brand Name Dispense Instructions for use Diagnosis    aspirin 81 MG tablet     90 tablet    Take 81 mg by mouth as needed        atenolol-chlorthalidone 50-25 MG per tablet    TENORETIC    30 tablet    TAKE ONE TABLET BY MOUTH ONE TIME DAILY    Essential hypertension with goal blood pressure less than 140/90

## 2018-06-11 ENCOUNTER — THERAPY VISIT (OUTPATIENT)
Dept: PHYSICAL THERAPY | Facility: CLINIC | Age: 53
End: 2018-06-11
Payer: COMMERCIAL

## 2018-06-11 DIAGNOSIS — M25.561 RIGHT KNEE PAIN: ICD-10-CM

## 2018-06-11 DIAGNOSIS — Z98.890 S/P ARTHROSCOPIC PARTIAL MEDIAL MENISCECTOMY: ICD-10-CM

## 2018-06-11 PROCEDURE — 97530 THERAPEUTIC ACTIVITIES: CPT | Mod: GP | Performed by: PHYSICAL THERAPIST

## 2018-06-11 PROCEDURE — 97110 THERAPEUTIC EXERCISES: CPT | Mod: GP | Performed by: PHYSICAL THERAPIST

## 2018-06-11 ASSESSMENT — ACTIVITIES OF DAILY LIVING (ADL)
KNEE_ACTIVITY_OF_DAILY_LIVING_SCORE: 78.57
WALK: ACTIVITY IS SOMEWHAT DIFFICULT
STAND: ACTIVITY IS MINIMALLY DIFFICULT
KNEE_ACTIVITY_OF_DAILY_LIVING_SUM: 55
HOW_WOULD_YOU_RATE_THE_OVERALL_FUNCTION_OF_YOUR_KNEE_DURING_YOUR_USUAL_DAILY_ACTIVITIES?: NEARLY NORMAL
STIFFNESS: I HAVE THE SYMPTOM BUT IT DOES NOT AFFECT MY ACTIVITY
RAW_SCORE: 55
LIMPING: I HAVE THE SYMPTOM BUT IT DOES NOT AFFECT MY ACTIVITY
GIVING WAY, BUCKLING OR SHIFTING OF KNEE: I DO NOT HAVE THE SYMPTOM
KNEEL ON THE FRONT OF YOUR KNEE: ACTIVITY IS MINIMALLY DIFFICULT
GO DOWN STAIRS: ACTIVITY IS MINIMALLY DIFFICULT
SQUAT: ACTIVITY IS MINIMALLY DIFFICULT
GO UP STAIRS: ACTIVITY IS SOMEWHAT DIFFICULT
RISE FROM A CHAIR: ACTIVITY IS NOT DIFFICULT
HOW_WOULD_YOU_RATE_THE_CURRENT_FUNCTION_OF_YOUR_KNEE_DURING_YOUR_USUAL_DAILY_ACTIVITIES_ON_A_SCALE_FROM_0_TO_100_WITH_100_BEING_YOUR_LEVEL_OF_KNEE_FUNCTION_PRIOR_TO_YOUR_INJURY_AND_0_BEING_THE_INABILITY_TO_PERFORM_ANY_OF_YOUR_USUAL_DAILY_ACTIVITIES?: 70
AS_A_RESULT_OF_YOUR_KNEE_INJURY,_HOW_WOULD_YOU_RATE_YOUR_CURRENT_LEVEL_OF_DAILY_ACTIVITY?: NEARLY NORMAL
WEAKNESS: THE SYMPTOM AFFECTS MY ACTIVITY SEVERELY
SIT WITH YOUR KNEE BENT: ACTIVITY IS NOT DIFFICULT
PAIN: I HAVE THE SYMPTOM BUT IT DOES NOT AFFECT MY ACTIVITY
SWELLING: I DO NOT HAVE THE SYMPTOM

## 2018-06-11 NOTE — MR AVS SNAPSHOT
After Visit Summary   6/11/2018    Thanh Loaiza    MRN: [id number]           Patient Information     Date Of Birth          1965        Visit Information        Provider Department      6/11/2018 7:00 AM Suzi White, PT [facility]        Today's Diagnoses     Right knee pain        S/P arthroscopic partial medial meniscectomy           Follow-ups after your visit        Your next 10 appointments already scheduled     Jun 12, 2018  8:15 AM CDT   [facility] with Dale [facility] MD   [facility]    [address] [facility] [address] [address]   [phone]            Jun 13, 2018  4:40 PM CDT   Marcello Walden with ROLY Paula   [facility]    [address] [address]   [phone]            Jun 19, 2018  8:10 AM CDT   SYDNEE Extremity with Suzi White PT   [facility] ([facility] [place]  )    [address]  [place] [address]   [phone]            Jun 26, 2018  8:10 AM CDT   SY[facility] Extremity with Suzi White PT   [facility] ([facility] [place]  )    [address]  [place] [address] [phone]              Who to contact     If you have questions or need follow up information about today's clinic visit or your schedule please contact [facility] directly at [phone].  Normal or non-critical lab and imaging results will be communicated to you by MyChart, letter or phone within 4 business days after the clinic has received the results. If you do not hear from us within 7 days, please contact the clinic through MyChart or phone. If you have a critical or abnormal lab result, we will notify you by phone as soon as possible.  Submit refill requests through [employer]  or call your pharmacy and they will forward the refill request to us. Please allow 3 business days for your refill to be completed.          Additional Information About Your Visit        [employer] Information     [employer] gives you secure access to your electronic health record. If you see a primary care provider, you can also send messages to your care team and make appointments. If you have questions, please call your primary care clinic.  If you do not have a primary care provider, please call [phone] and they will assist you.        Care EveryWhere ID     This is your Care EveryWhere ID. This could be used by other organizations to access your [facility] medical records  [id number]         Blood Pressure from Last 3 Encounters:   05/31/18 111/74   05/07/18 112/77   05/03/18 115/77    Weight from Last 3 Encounters:   05/31/18 80.8 kg (178 lb 3.2 oz)   05/07/18 79.6 kg (175 lb 8 oz)   05/03/18 78.9 kg (174 lb)              We Performed the Following     SYDNEE Progress Notes Report     Therapeutic Activities     Therapeutic Exercises        Primary Care Provider Office Phone # Fax #    Saravanan Bocanegra [phone]       [address] [facility] [address]        Equal Access to Services     KHUSHBU FORD : Hadii aad ku hadasho Soomaali, waaxda luqadaha, qaybta kaalmada adeegyada, corwin rothman haymaryam rolon. So [facility] [phone].    ATENCIÓN: Si habla español, tiene a powers disposición servicios gratuitos de asistencia lingüística. Llame al [phone].    We comply with applicable federal civil rights laws and [place] laws. We do not discriminate on the basis of race, color, national origin, age, disability, sex, sexual orientation, or gender identity.            Thank you!     Thank you for choosing [facility]  for your care. Our goal is always to provide you with excellent care. Hearing back from our patients is one way we can continue to improve  our services. Please take a few minutes to complete the written survey that you may receive in the mail after your visit with us. Thank you!             Your Updated Medication List - Protect others around you: Learn how to safely use, store and throw away your medicines at www.disposemymeds.org.          This list is accurate as of 6/11/18  5:18 PM.  Always use your most recent med list.                   Brand Name Dispense Instructions for use Diagnosis    aspirin 81 MG tablet     90 tablet    Take 81 mg by mouth as needed        atenolol-chlorthalidone 50-25 MG per tablet    TENORETIC    30 tablet    TAKE ONE TABLET BY MOUTH ONE TIME DAILY    Essential hypertension with goal blood pressure less than 140/90       potassium chloride SA 10 MEQ CR tablet    K-DUR/KLOR-CON M    30 tablet    Take 1 tablet (10 mEq) by mouth daily    Essential hypertension

## 2018-06-11 NOTE — PROGRESS NOTES
Subjective:  HPI                    Objective:  System    Physical Exam    General     ROS    Assessment/Plan:    PROGRESS  REPORT    Progress reporting period is from 4-23-18 to 6-11-18.       SUBJECTIVE  Subjective changes noted by patient:  Patient reports that he walked around the block yesterday and started limping with about a 1/2 block left of the 4 blocks. He feels more fatigue than pain in the leg/knee now.      Current Pain level: 0/10.     Previous pain level was  0/10  .   Changes in function:  Yes (See Goal flowsheet attached for changes in current functional level)  Adverse reaction to treatment or activity: None    OBJECTIVE  Changes noted in objective findings:  Supine R knee AAROM: 0-1-140. R knee extension strength 4-/5. Patient unable to do SLR without quad lag.         ASSESSMENT/PLAN  Updated problem list and treatment plan: Diagnosis 1:  S/p partial menisectomy R knee  Pain -  hot/cold therapy, manual therapy, self management, education, directional preference exercise and home program  Decreased ROM/flexibility - manual therapy, therapeutic exercise, therapeutic activity and home program  Decreased strength - therapeutic exercise, therapeutic activities and home program  Impaired balance - neuro re-education, therapeutic activities and home program  Decreased proprioception - neuro re-education, gait training, therapeutic activities and home program  Impaired gait - gait training, assistive devices and home program  Impaired muscle performance - neuro re-education and home program  Decreased function - therapeutic activities and home program  STG/LTGs have been met or progress has been made towards goals:  Yes (See Goal flow sheet completed today.)  Assessment of Progress: The patient's condition is improving.  Slow return of R quadriceps strength.   Self Management Plans:  Patient has been instructed in a home treatment program.  Patient  has been instructed in self management of  symptoms.    Thanh continues to require the following intervention to meet STG and LTG's:  PT    Recommendations:  This patient would benefit from continued therapy.     Frequency:  1 X week, once daily  Duration:  for 8 weeks      This patient would benefit from further evaluation.    Please refer to the daily flowsheet for treatment today, total treatment time and time spent performing 1:1 timed codes.

## 2018-06-12 ENCOUNTER — OFFICE VISIT (OUTPATIENT)
Dept: ORTHOPEDICS | Facility: CLINIC | Age: 53
End: 2018-06-12
Payer: COMMERCIAL

## 2018-06-12 VITALS
DIASTOLIC BLOOD PRESSURE: 77 MMHG | RESPIRATION RATE: 18 BRPM | SYSTOLIC BLOOD PRESSURE: 113 MMHG | HEIGHT: 67 IN | TEMPERATURE: 98.3 F | BODY MASS INDEX: 27.47 KG/M2 | WEIGHT: 175 LBS

## 2018-06-12 DIAGNOSIS — M94.261 CHONDROMALACIA OF RIGHT KNEE: ICD-10-CM

## 2018-06-12 DIAGNOSIS — Z98.890 S/P ARTHROSCOPIC PARTIAL MEDIAL MENISCECTOMY: Primary | ICD-10-CM

## 2018-06-12 PROCEDURE — 99024 POSTOP FOLLOW-UP VISIT: CPT | Performed by: ORTHOPAEDIC SURGERY

## 2018-06-12 NOTE — MR AVS SNAPSHOT
After Visit Summary   6/12/2018    Thanh Loaiza    MRN: [id number]           Patient Information     Date Of Birth          1965        Visit Information        Provider Department      6/12/2018 8:15 AM Dale Lester MD [facility]        Today's Diagnoses     S/P arthroscopic partial medial meniscectomy    -  1    Chondromalacia of right knee           Follow-ups after your visit        Your next 10 appointments already scheduled     Jun 13, 2018  4:40 PM CDT   Marcello Walden with ROLY Paula   [facility] ([facility])    [address] [facility] [address]   [phone]            Jun 19, 2018  8:10 AM CDT   SYDNEE Extremity with Suzi White PT   [facility] [place] ([facility]  )    [address]  [place] [address]   [phone]            Jun 26, 2018  8:10 AM CDT   SYDNEE Extremity with Suzi White PT   [facility] [place] ([facility]  )    [address]  [place] [address]   [phone]              Who to contact     If you have questions or need follow up information about today's clinic visit or your schedule please contact [facility] directly at [phone].  Normal or non-critical lab and imaging results will be communicated to you by MyChart, letter or phone within 4 business days after the clinic has received the results. If you do not hear from us within 7 days, please contact the clinic through Tactilizehart or phone. If you have a critical or abnormal lab result, we will notify you by phone as soon as possible.  Submit refill requests through Webbynode or call your pharmacy and they will forward the refill request to us. Please allow 3 business days for your refill to be completed.          Additional Information About Your Visit        MyChart Information     [facility] gives  "you secure access to your electronic health record. If you see a primary care provider, you can also send messages to your care team and make appointments. If you have questions, please call your primary care clinic.  If you do not have a primary care provider, please call [phone] and they will assist you.        Care EveryWhere ID     This is your Care EveryWhere ID. This could be used by other organizations to access your [place] medical records  [id number]        Your Vitals Were     Temperature Respirations Height BMI (Body Mass Index)          98.3  F (36.8  C) 18 1.708 m (5' 7.25\") 27.21 kg/m2         Blood Pressure from Last 3 Encounters:   06/12/18 113/77   05/31/18 111/74   05/07/18 112/77    Weight from Last 3 Encounters:   06/12/18 79.4 kg (175 lb)   05/31/18 80.8 kg (178 lb 3.2 oz)   05/07/18 79.6 kg (175 lb 8 oz)              Today, you had the following     No orders found for display       Primary Care Provider Office Phone # Fax #    Saravanan Bocanegra [phone]       [address] [address] [facility] [address]        Equal Access to Services     KHUSHBU FORD : Hadii aad ku hadasho Soomaali, waaxda luqadaha, qaybta kaalmada adeegyada, waxay idiin hayclaritan yg clemente ladong rolon. So [facility] [phone].    ATENCIÓN: Si habla español, tiene a powers disposición servicios gratuitos de asistencia lingüística. Llame al [phone].    We comply with applicable federal civil rights laws and [place] laws. We do not discriminate on the basis of race, color, national origin, age, disability, sex, sexual orientation, or gender identity.            Thank you!     Thank you for choosing [facility]  for your care. Our goal is always to provide you with excellent care. Hearing back from our patients is one way we can continue to improve our services. Please take a few minutes to complete the written survey that you may receive in the mail after your visit with us. Thank you!           "   Your Updated Medication List - Protect others around you: Learn how to safely use, store and throw away your medicines at www.disposemymeds.org.          This list is accurate as of 6/12/18 10:04 AM.  Always use your most recent med list.                   Brand Name Dispense Instructions for use Diagnosis    aspirin 81 MG tablet     90 tablet    Take 81 mg by mouth as needed        atenolol-chlorthalidone 50-25 MG per tablet    TENORETIC    30 tablet    TAKE ONE TABLET BY MOUTH ONE TIME DAILY    Essential hypertension with goal blood pressure less than 140/90       potassium chloride SA 10 MEQ CR tablet    K-DUR/KLOR-CON M    30 tablet    Take 1 tablet (10 mEq) by mouth daily    Essential hypertension

## 2018-06-12 NOTE — PROGRESS NOTES
Follow-up right knee arthroscopy with 30% partial medial meniscectomy, plica excision on 3/16/18. Pain has resolved, but he has persistent weakness.  He had significant swelling, pain and limited range of motion early after surgery.  He also had a hernia surgery.  These things delayed his strengthening.  He is making good progress with Physical Therapy.    Wounds are healing well.    No tenderness to palpation.  No warmth or erythema.  He has trace effusion.   There is slight pain with range of motion in extension .    He has mild quadriceps weakness.  Range of motion: 0-135 degrees.    Assessment:  Right knee arthroscopy with partial medial meniscectomy with slow progress..  We will continue range of motion, strengthening with home exercise program  .  With pain resolved, the strength should continue to improve.

## 2018-06-12 NOTE — LETTER
6/12/2018         RE: Thanh Loaiza  [address]  [place] [address]        Dear Colleague,    Thank you for referring your patient, Thanh Loaiza, to the [facility]. Please see a copy of my visit note below.    Follow-up right knee arthroscopy with 30% partial medial meniscectomy, plica excision on 3/16/18. Pain has resolved, but he has persistent weakness.  He had significant swelling, pain and limited range of motion early after surgery.  He also had a hernia surgery.  These things delayed his strengthening.  He is making good progress with Physical Therapy.    Wounds are healing well.    No tenderness to palpation.  No warmth or erythema.  He has trace effusion.   There is slight pain with range of motion in extension .    He has mild quadriceps weakness.  Range of motion: 0-135 degrees.    Assessment:  Right knee arthroscopy with partial medial meniscectomy with slow progress..  We will continue range of motion, strengthening with home exercise program  .  With pain resolved, the strength should continue to improve.          Again, thank you for allowing me to participate in the care of your patient.        Sincerely,        Dale Lester MD

## 2018-06-13 ENCOUNTER — OFFICE VISIT (OUTPATIENT)
Dept: FAMILY MEDICINE | Facility: CLINIC | Age: 53
End: 2018-06-13
Payer: COMMERCIAL

## 2018-06-13 VITALS
DIASTOLIC BLOOD PRESSURE: 77 MMHG | HEART RATE: 59 BPM | OXYGEN SATURATION: 97 % | SYSTOLIC BLOOD PRESSURE: 114 MMHG | WEIGHT: 180.6 LBS | TEMPERATURE: 98.1 F | BODY MASS INDEX: 28.08 KG/M2

## 2018-06-13 DIAGNOSIS — Z23 NEED FOR VACCINATION: ICD-10-CM

## 2018-06-13 DIAGNOSIS — K64.4 EXTERNAL HEMORRHOIDS: Primary | ICD-10-CM

## 2018-06-13 PROCEDURE — 90471 IMMUNIZATION ADMIN: CPT | Performed by: PHYSICIAN ASSISTANT

## 2018-06-13 PROCEDURE — 90750 HZV VACC RECOMBINANT IM: CPT | Performed by: PHYSICIAN ASSISTANT

## 2018-06-13 PROCEDURE — 99213 OFFICE O/P EST LOW 20 MIN: CPT | Mod: 25 | Performed by: PHYSICIAN ASSISTANT

## 2018-06-13 NOTE — MR AVS SNAPSHOT
After Visit Summary   6/13/2018    Thanh Loaiza    MRN: [id number]           Patient Information     Date Of Birth          1965        Visit Information        Provider Department      6/13/2018 4:40 PM Ernesto Holland PA [facility]        Today's Diagnoses     External hemorrhoids    -  1    Need for vaccination          Care Instructions      Hemorrhoids    Hemorrhoids are swollen and inflamed veins inside the rectum and near the anus. The rectum is the last several inches of the colon. The anus is the passage between the rectum and the outside of the body.  Causes  The veins can become swollen due to increased pressure in them. This is most often caused by:    Chronic constipation or diarrhea    Straining when having a bowel movement    Sitting too long on the toilet    A low-fiber diet    Pregnancy  Symptoms    Bleeding from the rectum (this may be noticeable after bowel movements)    Lump near the anus    Itching around the anus    Pain around the anus  There are different types of hemorrhoids. Depending on the type you have and the severity, you may be able to treat yourself at home. In some cases, a procedure may be the best treatment option. Your healthcare provider can tell you more about this, if needed.  Home care  General care    To get relief from pain or itching, try:  ? Medicines. Your healthcare provider may recommend stool softeners, suppositories, or laxatives to help manage constipation. Use these exactly as directed.  ? Sitz baths. A sitz bath involves sitting in a few inches of warm bath water. Be careful not to make the water so hot that you burn yourself--test it before sitting in it. Soak for about 10 to 15 minutes a few times a day. This may help relieve pain.  ? Topical products. Your healthcare provider may prescribe or recommend creams, ointments, or pads that can be applied to the hemorrhoid. Use these exactly as directed.  Tips to help  prevent hemorrhoids    Eat more fiber. Fiber adds bulk to stool and absorbs water as it moves through your colon. This makes stool softer and easier to pass.  ? Increase the fiber in your diet with more fiber-rich foods. These include fresh fruit, vegetables, and whole grains.  ? Take a fiber supplement or bulking agent, if advised by your healthcare provider. These include products such as psyllium or methylcellulose.    Drink more water. Your healthcare provider may direct you to drink plenty of water. This can help keep stool soft.    Be more active. Frequent exercise aids digestion and helps prevent constipation. It may also help make bowel movements more regular.    Don t strain during bowel movements. This can make hemorrhoids more likely. Also, don t sit on the toilet for long periods of time.  Follow-up care  Follow up with your healthcare provider as advised. If a culture or imaging tests were done, someone will let you know the results when they are ready. This may take a few days or longer. If your healthcare provider recommends a procedure for your hemorrhoids, these options can be discussed. Options may include surgery and outpatient office treatments.  When to seek medical advice  Call your healthcare provider right away if any of these occur:    Increased bleeding from the rectum    Increased pain around the rectum or anus    Weakness or dizziness  Call 911  Call 911 if any of these occur:    Trouble breathing or swallowing    Fainting or loss of consciousness    Unusually fast heart rate    Vomiting blood    Large amounts of blood in stool or black, tarry stools  Date Last Reviewed: 9/1/2017 2000-2017 [employer]. [address] [place] [address], [place] [address]. All rights reserved. This information is not intended as a substitute for professional medical care. Always follow your healthcare professional's instructions.                Follow-ups after your visit        Follow-up notes from  your care team     Return in about 3 months (around 9/13/2018) for 2nd shingles vaccine.      Your next 10 appointments already scheduled     Jun 19, 2018  8:10 AM CDT   SYDNEE Extremity with Suzi White, PT   [facility] [address] ([facility]  )    [address]  [facility] [address]   [phone]            Jun 26, 2018  8:10 AM CDT   SYDNEE Extremity with Suzi White PT   [facility] [address] ([facility]  )    [address]  [facility] [address]   [phone]              Who to contact     If you have questions or need follow up information about today's clinic visit or your schedule please contact [facility] directly at [phone].  Normal or non-critical lab and imaging results will be communicated to you by MyChart, letter or phone within 4 business days after the clinic has received the results. If you do not hear from us within 7 days, please contact the clinic through Fisochart or phone. If you have a critical or abnormal lab result, we will notify you by phone as soon as possible.  Submit refill requests through Frank [employer] Oak or call your pharmacy and they will forward the refill request to us. Please allow 3 business days for your refill to be completed.          Additional Information About Your Visit        MyChart Information     [employer] gives you secure access to your electronic health record. If you see a primary care provider, you can also send messages to your care team and make appointments. If you have questions, please call your primary care clinic.  If you do not have a primary care provider, please call [phone] and they will assist you.        Care EveryWhere ID     This is your Care EveryWhere ID. This could be used by other organizations to access your [place] medical records  [id number]        Your Vitals Were     Pulse Temperature Pulse Oximetry BMI (Body Mass Index)          59  98.1  F (36.7  C) (Oral) 97% 28.08 kg/m2         Blood Pressure from Last 3 Encounters:   06/13/18 114/77   06/12/18 113/77   05/31/18 111/74    Weight from Last 3 Encounters:   06/13/18 180 lb 9.6 oz (81.9 kg)   06/12/18 175 lb (79.4 kg)   05/31/18 178 lb 3.2 oz (80.8 kg)              We Performed the Following     HC ZOSTER VACCINE RECOMBINANT ADJUVANTED IM NJX     VACCINE ADMINISTRATION, INITIAL        Primary Care Provider Office Phone # Fax #    Saravanan Bocanegra [phone]       [address] [place]  [facility] [address]        Equal Access to Services     CAMPBELL FORD : Hadii aad ku hadasho Sokatty, waaxda luqadaha, qaybta kaalmada adeegyada, corwin ferro . So [facility] [phone].    ATENCIÓN: Si habla español, tiene a powers disposición servicios gratuitos de asistencia lingüística. LlOhio[facility] [phone].    We comply with applicable federal civil rights laws and [place] laws. We do not discriminate on the basis of race, color, national origin, age, disability, sex, sexual orientation, or gender identity.            Thank you!     Thank you for choosing [facility]  for your care. Our goal is always to provide you with excellent care. Hearing back from our patients is one way we can continue to improve our services. Please take a few minutes to complete the written survey that you may receive in the mail after your visit with us. Thank you!             Your Updated Medication List - Protect others around you: Learn how to safely use, store and throw away your medicines at www.disposemymeds.org.          This list is accurate as of 6/13/18  5:52 PM.  Always use your most recent med list.                   Brand Name Dispense Instructions for use Diagnosis    aspirin 81 MG tablet     90 tablet    Take 81 mg by mouth as needed        atenolol-chlorthalidone 50-25 MG per tablet    TENORETIC    30 tablet    TAKE ONE TABLET BY MOUTH ONE TIME DAILY    Essential  hypertension with goal blood pressure less than 140/90       potassium chloride SA 10 MEQ CR tablet    K-DUR/KLOR-CON M    30 tablet    Take 1 tablet (10 mEq) by mouth daily    Essential hypertension

## 2018-06-13 NOTE — PATIENT INSTRUCTIONS
Hemorrhoids    Hemorrhoids are swollen and inflamed veins inside the rectum and near the anus. The rectum is the last several inches of the colon. The anus is the passage between the rectum and the outside of the body.  Causes  The veins can become swollen due to increased pressure in them. This is most often caused by:    Chronic constipation or diarrhea    Straining when having a bowel movement    Sitting too long on the toilet    A low-fiber diet    Pregnancy  Symptoms    Bleeding from the rectum (this may be noticeable after bowel movements)    Lump near the anus    Itching around the anus    Pain around the anus  There are different types of hemorrhoids. Depending on the type you have and the severity, you may be able to treat yourself at home. In some cases, a procedure may be the best treatment option. Your healthcare provider can tell you more about this, if needed.  Home care  General care    To get relief from pain or itching, try:  ? Medicines. Your healthcare provider may recommend stool softeners, suppositories, or laxatives to help manage constipation. Use these exactly as directed.  ? Sitz baths. A sitz bath involves sitting in a few inches of warm bath water. Be careful not to make the water so hot that you burn yourself--test it before sitting in it. Soak for about 10 to 15 minutes a few times a day. This may help relieve pain.  ? Topical products. Your healthcare provider may prescribe or recommend creams, ointments, or pads that can be applied to the hemorrhoid. Use these exactly as directed.  Tips to help prevent hemorrhoids    Eat more fiber. Fiber adds bulk to stool and absorbs water as it moves through your colon. This makes stool softer and easier to pass.  ? Increase the fiber in your diet with more fiber-rich foods. These include fresh fruit, vegetables, and whole grains.  ? Take a fiber supplement or bulking agent, if advised by your healthcare provider. These include products such as  psyllium or methylcellulose.    Drink more water. Your healthcare provider may direct you to drink plenty of water. This can help keep stool soft.    Be more active. Frequent exercise aids digestion and helps prevent constipation. It may also help make bowel movements more regular.    Don t strain during bowel movements. This can make hemorrhoids more likely. Also, don t sit on the toilet for long periods of time.  Follow-up care  Follow up with your healthcare provider as advised. If a culture or imaging tests were done, someone will let you know the results when they are ready. This may take a few days or longer. If your healthcare provider recommends a procedure for your hemorrhoids, these options can be discussed. Options may include surgery and outpatient office treatments.  When to seek medical advice  Call your healthcare provider right away if any of these occur:    Increased bleeding from the rectum    Increased pain around the rectum or anus    Weakness or dizziness  Call 911  Call 911 if any of these occur:    Trouble breathing or swallowing    Fainting or loss of consciousness    Unusually fast heart rate    Vomiting blood    Large amounts of blood in stool or black, tarry stools  Date Last Reviewed: 9/1/2017 2000-2017 [employer]. [address] [place] [address], [place] [address]. All rights reserved. This information is not intended as a substitute for professional medical care. Always follow your healthcare professional's instructions.

## 2018-06-13 NOTE — PROGRESS NOTES
SUBJECTIVE:   Thanh Loaiza is a 52 year old male who presents to clinic today for the following health issues:  Hemorrhoids  Onset: 3-4 years    Description:   Pain: no   Itching: no     Accompanying Signs & Symptoms:  Blood streaked toilet paper: YES- once in a while  Blood in stool: no   Changes in stool pattern: not in last 1-2 months    History:   Any previous GI studies done:Colonoscopy  Family History of colon cancer: no     Precipitating factors:   None    Alleviating factors:  Spicy food  Therapies Tried and outcome: none      Saw a specialist 1 years ago. Was told was ok.  Sometimes has some external stickiness and a bump.  No pain currently.      Interested in shingrix vacc    Hernia wound doing well.                    No Known Allergies    Past Medical History:   Diagnosis Date     DDD (degenerative disc disease), lumbar      DJD (degenerative joint disease), lumbar      Hypertension goal BP (blood pressure) < 140/90 12/14/2010         Current Outpatient Prescriptions on File Prior to Visit:  aspirin 81 MG tablet Take 81 mg by mouth as needed    atenolol-chlorthalidone (TENORETIC) 50-25 MG per tablet TAKE ONE TABLET BY MOUTH ONE TIME DAILY   potassium chloride SA (K-DUR/KLOR-CON M) 10 MEQ CR tablet Take 1 tablet (10 mEq) by mouth daily     No current facility-administered medications on file prior to visit.     Social History   Substance Use Topics     Smoking status: Never Smoker     Smokeless tobacco: Never Used     Alcohol use No       ROS:  General: negative for fever  SKIN: + as above  ABD- recent hernia sx    Physcial Exam:  /77 (BP Location: Left arm, Patient Position: Chair, Cuff Size: Adult Regular)  Pulse 59  Temp 98.1  F (36.7  C) (Oral)  Wt 180 lb 9.6 oz (81.9 kg)  SpO2 97%  BMI 28.08 kg/m2    GENERAL: alert, no acute distress  EYES: conjunctival clear  RESP: Regular breathing rate  NEURO: awake .  SKIN: ABD RLQ_ well healing sx scar, induratoin has decreased significantly  Anus-  small nonengorged hemorrhoid.      ASSESSMENT:    ICD-10-CM    1. External hemorrhoids K64.4    2. Need for vaccination Z23 HC ZOSTER VACCINE RECOMBINANT ADJUVANTED IM NJX       PLAN:Pt was advised would be responsible for cost if not covered by insurance,    See today's orders.  Follow-up with primary clinic if not improving.  Advised about symptoms which might herald more serious problems.

## 2018-06-19 ENCOUNTER — THERAPY VISIT (OUTPATIENT)
Dept: PHYSICAL THERAPY | Facility: CLINIC | Age: 53
End: 2018-06-19
Payer: COMMERCIAL

## 2018-06-19 ENCOUNTER — OFFICE VISIT (OUTPATIENT)
Dept: FAMILY MEDICINE | Facility: CLINIC | Age: 53
End: 2018-06-19
Payer: COMMERCIAL

## 2018-06-19 VITALS
OXYGEN SATURATION: 96 % | WEIGHT: 180 LBS | RESPIRATION RATE: 16 BRPM | DIASTOLIC BLOOD PRESSURE: 75 MMHG | BODY MASS INDEX: 28.25 KG/M2 | TEMPERATURE: 98.1 F | HEIGHT: 67 IN | HEART RATE: 58 BPM | SYSTOLIC BLOOD PRESSURE: 111 MMHG

## 2018-06-19 DIAGNOSIS — R10.823 RIGHT LOWER QUADRANT ABDOMINAL TENDERNESS WITH REBOUND TENDERNESS: Primary | ICD-10-CM

## 2018-06-19 DIAGNOSIS — Z98.890 S/P ARTHROSCOPIC PARTIAL MEDIAL MENISCECTOMY: ICD-10-CM

## 2018-06-19 DIAGNOSIS — M25.561 RIGHT KNEE PAIN: ICD-10-CM

## 2018-06-19 PROCEDURE — 97530 THERAPEUTIC ACTIVITIES: CPT | Mod: GP | Performed by: PHYSICAL THERAPIST

## 2018-06-19 PROCEDURE — 97110 THERAPEUTIC EXERCISES: CPT | Mod: GP | Performed by: PHYSICAL THERAPIST

## 2018-06-19 PROCEDURE — 99213 OFFICE O/P EST LOW 20 MIN: CPT | Performed by: NURSE PRACTITIONER

## 2018-06-19 RX ORDER — NAPROXEN 500 MG/1
500 TABLET ORAL 2 TIMES DAILY PRN
Qty: 20 TABLET | Refills: 0 | Status: SHIPPED | OUTPATIENT
Start: 2018-06-19 | End: 2018-12-27

## 2018-06-19 ASSESSMENT — PAIN SCALES - GENERAL: PAINLEVEL: NO PAIN (0)

## 2018-06-19 NOTE — PROGRESS NOTES
Subjective:  HPI                    Objective:  System    Physical Exam    General     ROS    Assessment/Plan:    SUBJECTIVE  Subjective changes as noted by pt:  Patient reports that he saw the doctor and he told him that it may take 6-9 months to get his strength all back. He occasionally has knee pain if he walks more than one block and sometimes when lifting the leg straight up when laying down.      Current Pain level: 0/10   Changes in function:  Yes (See Goal flowsheet attached for changes in current functional level)     Adverse reaction to treatment or activity:  None    OBJECTIVE  Changes in objective findings:  Supine R knee AAROM: 0-0-WNL. Improved endurance, but still has quad lag with supine SLR. Unable to do step down due to quad weakness even at 2 in height.         ASSESSMENT  Thanh continues to require intervention to meet STG and LTG's: PT  Improving tolerance to strengthening exercises.   Response to therapy has shown an improvement in  strength  Progress made towards STG/LTG?  Yes (See Goal flowsheet attached for updates on achievement of STG and LTG)    PLAN  Continue current treatment plan until patient demonstrates readiness to progress to higher level exercises.    PTA/ATC plan:  N/A    Please refer to the daily flowsheet for treatment today, total treatment time and time spent performing 1:1 timed codes.

## 2018-06-19 NOTE — MR AVS SNAPSHOT
After Visit Summary   6/19/2018    Thanh Loaiza    MRN: [id number]           Patient Information     Date Of Birth          1965        Visit Information        Provider Department      6/19/2018 8:10 AM Suzi White, PT [facility]        Today's Diagnoses     Right knee pain        S/P arthroscopic partial medial meniscectomy           Follow-ups after your visit        Your next 10 appointments already scheduled     Jun 26, 2018  8:10 AM CDT   SYDNEE Extremity with Suzi White PT   [facility] [place] ([facility] [place]  )    [address]  [facility] [address]   [phone]            Jul 02, 2018  7:40 AM CDT   SYDNEE Extremity with Suzi White PT   [facility] [place] ([facility] [place]  )    [address]  [facility] [address]   [phone]            Jul 10, 2018  8:10 AM CDT   SYDNEE Extremity with Suzi White PT   [facility] [place] ([facility] [place]  )    [address]  [facility] [address]   [phone]            Jul 17, 2018  8:10 AM CDT   SYDNEE Extremity with Suzi White PT   [facility] Lindy Bhag[facility] ([facility]EE [place]  )    [address]  [facility] [address]   [phone]            Jul 26, 2018  7:40 AM CDT   SYDNEE Extremity with Suzi White PT   [facility] [place] ([facility] [place]  )    [address]  [facility] [address]   [phone]            Jul 31, 2018  8:10 AM CDT   SYDNEE Extremity with Suzi White PT   [facility] [place] ([facility] [place]  )    [address]  [facility] [address]   [phone]              Who to contact     If you have questions or need follow up information about today's clinic visit or your schedule please contact [facility] LIND[facility] directly at  [phone].  Normal or non-critical lab and imaging results will be communicated to you by MyChart, letter or phone within 4 business days after the clinic has received the results. If you do not hear from us within 7 days, please contact the clinic through [facility] [facility] or phone. If you have a critical or abnormal lab result, we will notify you by phone as soon as possible.  Submit refill requests through [facility] or call your pharmacy and they will forward the refill request to us. Please allow 3 business days for your refill to be completed.          Additional Information About Your Visit        [facility] [facility] Information     [facility] gives you secure access to your electronic health record. If you see a primary care provider, you can also send messages to your care team and make appointments. If you have questions, please call your primary care clinic.  If you do not have a primary care provider, please call [phone] and they will assist you.        Care EveryWhere ID     This is your Care EveryWhere ID. This could be used by other organizations to access your [place] medical records  [id number]         Blood Pressure from Last 3 Encounters:   06/19/18 111/75   06/13/18 114/77   06/12/18 113/77    Weight from Last 3 Encounters:   06/19/18 81.6 kg (180 lb)   06/13/18 81.9 kg (180 lb 9.6 oz)   06/12/18 79.4 kg (175 lb)              We Performed the Following     Therapeutic Activities     Therapeutic Exercises          Today's Medication Changes          These changes are accurate as of 6/19/18 11:59 PM.  If you have any questions, ask your nurse or doctor.               Start taking these medicines.        Dose/Directions    naproxen 500 MG tablet   Commonly known as:  NAPROSYN   Used for:  Right lower quadrant abdominal tenderness with rebound tenderness   Started by:  Jolie Perez APRN CNP        Dose:  500 mg   Take 1 tablet (500 mg) by mouth 2 times daily as needed for moderate pain   Quantity:  20 tablet    Refills:  0            Where to get your medicines      These medications were sent to [id number] - [place], [place] - [address] [facility]  [address] [facility] [address]     Phone:  [phone]     naproxen 500 MG tablet                Primary Care Provider Office Phone # Fax #    Saravanan Bocanegra -[phone]       [address]  [facility] [address]        Equal Access to Services     [facility]: Hadii aad ku hadasho Soomaali, waaxda luqadaha, qaybta kaalmada adeegyada, waxay idiin hayaan adeeg kharash la'maryam . So [facility] [phone].    ATENCIÓN: Si habla español, tiene a powers disposición servicios gratuitos de asistencia lingüística. [facility] [phone].    We comply with applicable federal civil rights laws and [place] laws. We do not discriminate on the basis of race, color, national origin, age, disability, sex, sexual orientation, or gender identity.            Thank you!     Thank you for choosing [place] [facility]  for your care. Our goal is always to provide you with excellent care. Hearing back from our patients is one way we can continue to improve our services. Please take a few minutes to complete the written survey that you may receive in the mail after your visit with us. Thank you!             Your Updated Medication List - Protect others around you: Learn how to safely use, store and throw away your medicines at www.disposemymeds.org.          This list is accurate as of 6/19/18 11:59 PM.  Always use your most recent med list.                   Brand Name Dispense Instructions for use Diagnosis    aspirin 81 MG tablet     90 tablet    Take 81 mg by mouth as needed        atenolol-chlorthalidone 50-25 MG per tablet    TENORETIC    30 tablet    TAKE ONE TABLET BY MOUTH ONE TIME DAILY    Essential hypertension with goal blood pressure less than 140/90       naproxen 500 MG tablet    NAPROSYN    20 tablet    Take 1 tablet (500 mg)  by mouth 2 times daily as needed for moderate pain    Right lower quadrant abdominal tenderness with rebound tenderness       potassium chloride SA 10 MEQ CR tablet    K-DUR/KLOR-CON M    30 tablet    Take 1 tablet (10 mEq) by mouth daily    Essential hypertension

## 2018-06-19 NOTE — PROGRESS NOTES
SUBJECTIVE:   Thanh Loaiza is a 52 year old male who presents to clinic today for the following health issues:      Concern - Bump on skin  Onset:     Description:   Bumps on skin    Intensity: mild    Progression of Symptoms:  worsening    Accompanying Signs & Symptoms:  Discomfort/pain    Previous history of similar problem:   Yes    Precipitating factors:   Worsened by: None    Alleviating factors:  Improved by: None    Therapies Tried and outcome: None    Pleasant 52 year old male presents with focal RLQ abdominal tenderness that is just next to a suspected lipoma. He reports that he has many lipomas. Had hernia surgery about 1 month ago. Normal BMs. No nausea or vomiting. No fever. No weight loss or night sweats. He's tried apple cider vinegar. Wonders if it's a muscle strain from sitting awkwardly with this computer or cancer from his various lumps.    Problem list and histories reviewed & adjusted, as indicated.  Additional history: as documented    Patient Active Problem List   Diagnosis     Erectile dysfunction     CARDIOVASCULAR SCREENING; LDL GOAL LESS THAN 130     Disorder of sacrum     Lumbago     Essential hypertension     DJD (degenerative joint disease), lumbar     Cataract extraction status of right eye     Gastroesophageal reflux disease without esophagitis     H. pylori infection     Complex tear of medial meniscus of right knee as current injury, subsequent encounter     Chondromalacia of right knee     S/P arthroscopy of right knee     Right knee pain     S/P arthroscopic partial medial meniscectomy     External hemorrhoids     Past Surgical History:   Procedure Laterality Date     ARTHROSCOPY KNEE Right 3/16/2018    Procedure: ARTHROSCOPY KNEE;  Right knee arthroscopy, debridement ;  Surgeon: Dale Lester MD;  Location: [facility] OR     COLONOSCOPY  10/14/2011    Procedure:COLONOSCOPY; Colonoscopy, change in stools, diarrhea; Surgeon:ALEIDA DELEON; Location:[facility] OR     COLONOSCOPY   "10/14/2011    Procedure:COMBINED COLONOSCOPY, SINGLE BIOPSY/POLYPECTOMY BY BIOPSY; Surgeon:ALEIDA DELEON; Location:[facility] OR     ORTHOPEDIC SURGERY       TONSILLECTOMY      age 11 years       Social History   Substance Use Topics     Smoking status: Never Smoker     Smokeless tobacco: Never Used     Alcohol use No     Family History   Problem Relation Age of Onset     Hypertension Mother      Diabetes Father      Hypertension Father      Hypertension Brother          Current Outpatient Prescriptions   Medication Sig Dispense Refill     aspirin 81 MG tablet Take 81 mg by mouth as needed  90 tablet 3     atenolol-chlorthalidone (TENORETIC) 50-25 MG per tablet TAKE ONE TABLET BY MOUTH ONE TIME DAILY 30 tablet 11     naproxen (NAPROSYN) 500 MG tablet Take 1 tablet (500 mg) by mouth 2 times daily as needed for moderate pain 20 tablet 0     potassium chloride SA (K-DUR/KLOR-CON M) 10 MEQ CR tablet Take 1 tablet (10 mEq) by mouth daily 30 tablet 1     No Known Allergies    Reviewed and updated as needed this visit by clinical staff  Tobacco  Allergies  Meds  Problems  Med Hx  Surg Hx  Fam Hx  Soc Hx        Reviewed and updated as needed this visit by Provider  Allergies  Meds  Problems         ROS:  Constitutional, HEENT, cardiovascular, pulmonary, gi and gu systems are negative, except as otherwise noted.    OBJECTIVE:     /75 (BP Location: Right arm, Patient Position: Chair, Cuff Size: Adult Regular)  Pulse 58  Temp 98.1  F (36.7  C) (Oral)  Resp 16  Ht 5' 7.25\" (1.708 m)  Wt 180 lb (81.6 kg)  SpO2 96%  BMI 27.98 kg/m2  Body mass index is 27.98 kg/(m^2).  GENERAL: healthy, alert and no distress  ABDOMEN: focal tenderness to RLQ of abdomen just medial to a suspected lipoma. No erythema. No guarding. No rebound tenderness. It is more lateral abdomen than a suspected appendicitis  PSYCH: mentation appears normal, affect normal/bright    Diagnostic Test Results:  none     ASSESSMENT/PLAN:     1. Right " lower quadrant abdominal tenderness with rebound tenderness  Likely benign but patient is quite concerned so offered US if not improving in couple days with supportive interventions.  - US Abdomen Limited; Future  - naproxen (NAPROSYN) 500 MG tablet; Take 1 tablet (500 mg) by mouth 2 times daily as needed for moderate pain  Dispense: 20 tablet; Refill: 0  Start naproxen 500 mg every 12 hours for pain/inflammation. Take with food.  Ice or heat 15 minutes 4-6 times daily to the affected area. If no improvement in 48 hours, schedule US    See Patient Instructions    Patient verbalizes understanding and agrees with plan of care. Patient stable for discharge.      RIKI Seth CNP  [facility]

## 2018-06-19 NOTE — PATIENT INSTRUCTIONS
Start naproxen 500 mg every 12 hours for pain/inflammation. Take with food.  Ice or heat 15 minutes 4-6 times daily to the affected area. If no improvement in 48 hours, schedule US    At [facility], we strive to deliver an exceptional experience to you, every time we see you.  If you receive a survey in the mail, please send us back your thoughts. We really do value your feedback.    Based on your medical history, these are the current health maintenance/preventive care services that you are due for (some may have been done at this visit.)  Health Maintenance Due   Topic Date Due     HIV SCREEN (SYSTEM ASSIGNED)  08/27/1981         Suggested websites for health information:  Www.FinAnalytica.SandLinks : Up to date and easily searchable information on multiple topics.  Www.medlineplus.gov : medication info, interactive tutorials, watch real surgeries online  Www.familydoctor.org : good info from the Academy of Family Physicians  Www.cdc.gov : public health info, travel advisories, epidemics (H1N1)  Www.aap.org : children's health info, normal development, vaccinations  Www.health.Pending sale to [facility].[place].us : [place] dept of health, public health issues in [place], N1N1    Your care team:                            Family Medicine Internal Medicine   MD Alexy Giraldo MD Shantel Branch-Fleming, MD Katya Georgiev PA-C Nam Ho, MD Pediatrics   GOPI Gale, MD Martha Cuellar CNP, MD Deborah Mielke, MD Kim Thein, APRN [facility]      Clinic hours: Monday - Thursday 7 am-7 pm; Fridays 7 am-5 pm.   Urgent care: Monday - Friday 11 am-9 pm; Saturday and Sunday 9 am-5 pm.  Pharmacy : Monday -Thursday 8 am-8 pm; Friday 8 am-6 pm; Saturday and Sunday 9 am-5 pm.     Clinic: [phone]   Pharmacy: [phone]

## 2018-06-19 NOTE — MR AVS SNAPSHOT
After Visit Summary   6/19/2018    Thanh Loaiza    MRN: [id number]           Patient Information     Date Of Birth          1965        Visit Information        Provider Department      6/19/2018 9:00 AM Jolie Perez APRN CNP [facility]        Today's Diagnoses     Right lower quadrant abdominal tenderness with rebound tenderness    -  1      Care Instructions    Start naproxen 500 mg every 12 hours for pain/inflammation. Take with food.  Ice or heat 15 minutes 4-6 times daily to the affected area. If no improvement in 48 hours, schedule US    At [facility], we strive to deliver an exceptional experience to you, every time we see you.  If you receive a survey in the mail, please send us back your thoughts. We really do value your feedback.    Based on your medical history, these are the current health maintenance/preventive care services that you are due for (some may have been done at this visit.)  Health Maintenance Due   Topic Date Due     HIV SCREEN (SYSTEM ASSIGNED)  08/27/1981         Suggested websites for health information:  Www.Zlio.org : Up to date and easily searchable information on multiple topics.  Www.medlineplus.gov : medication info, interactive tutorials, watch real surgeries online  Www.familydoctor.org : good info from the Academy of Family Physicians  Www.cdc.gov : public health info, travel advisories, epidemics (H1N1)  Www.aap.org : children's health info, normal development, vaccinations  Www.health.state.[place].us : [place] dept of health, public health issues in [place], N1N1    Your care team:                            Family Medicine Internal Medicine   MD Alexy Giraldo MD Shantel Branch-Fleming, MD Katya Georgiev PA-C Nam Ho, MD Pediatrics   GOPI Gale, MD Martha Cuellar CNP, MD Deborah Mielke, MD Kim Thein, APRN CNP      Clinic hours:  Monday - Thursday 7 am-7 pm; Fridays 7 am-5 pm.   Urgent care: Monday - Friday 11 am-9 pm; Saturday and Sunday 9 am-5 pm.  Pharmacy : Monday -Thursday 8 am-8 pm; Friday 8 am-6 pm; Saturday and Sunday 9 am-5 pm.     Clinic: [phone]   Pharmacy: [phone]            Follow-ups after your visit        Your next 10 appointments already scheduled     Jun 26, 2018  8:10 AM CDT   SYDNEE Extremity with Suzi White, PT   [facility] [facility] [place] ([facility] [place]  )    [address]  [facility] [address] [phone]            Jul 02, 2018  7:40 AM CDT   SYDNEE Extremity with Suzi White, PT   [place] [facility]    [address]  [facility] [address] [phone]            Jul 10, 2018  8:10 AM CDT   SYDNEE Extremity with Suzi White, PT   [facility] [facility]  [facility]    [address]  [facility] [address] [phone]            Jul 17, 2018  8:10 AM CDT   SYDNEE Extremity with Suzi White, PT   [facility]  [facility]    [address]  [facility] [id number] [phone]            Jul 26, 2018  7:40 AM CDT   SYDNEE Extremity with Suzi White PT   [place] [facility] ([facility] [place]  )    [address]  [facility] [address] [phone]            Jul 31, 2018  8:10 AM CDT   SYDNEE Extremity with Suzi White, PT   [place] [facility]  [facility]    [address]  [facility] [address] [phone]              Future tests that were ordered for you today     Open Future Orders        Priority Expected Expires Ordered    US Abdomen Limited Routine  6/19/2019 6/19/2018            Who to contact     If you have questions or need follow up information about today's clinic visit or your schedule please contact [facility]  "CL[facility] directly at [phone].  Normal or non-critical lab and imaging results will be communicated to you by MyChart, letter or phone within 4 business days after the clinic has received the results. If you do not hear from us within 7 days, please contact the clinic through [facility] or phone. If you have a critical or abnormal lab result, we will notify you by phone as soon as possible.  Submit refill requests through [facility] or call your pharmacy and they will forward the refill request to us. Please allow 3 business days for your refill to be completed.          Additional Information About Your Visit        [facility] [employer] [employer] Information     [facility] gives you secure access to your electronic health record. If you see a primary care provider, you can also send messages to your care team and make appointments. If you have questions, please call your primary care clinic.  If you do not have a primary care provider, please call [phone] and they will assist you.        Care EveryWhere ID     This is your Care EveryWhere ID. This could be used by other organizations to access your [place] medical records  [id number]        Your Vitals Were     Pulse Temperature Respirations Height Pulse Oximetry BMI (Body Mass Index)    58 98.1  F (36.7  C) (Oral) 16 5' 7.25\" (1.708 m) 96% 27.98 kg/m2       Blood Pressure from Last 3 Encounters:   06/19/18 111/75   06/13/18 114/77   06/12/18 113/77    Weight from Last 3 Encounters:   06/19/18 180 lb (81.6 kg)   06/13/18 180 lb 9.6 oz (81.9 kg)   06/12/18 175 lb (79.4 kg)                 Today's Medication Changes          These changes are accurate as of 6/19/18  9:45 AM.  If you have any questions, ask your nurse or doctor.               Start taking these medicines.        Dose/Directions    naproxen 500 MG tablet   Commonly known as:  NAPROSYN   Used for:  Right lower quadrant abdominal tenderness with rebound tenderness   Started by:  Jolie Perez APRN CNP     "    Dose:  500 mg   Take 1 tablet (500 mg) by mouth 2 times daily as needed for moderate pain   Quantity:  20 tablet   Refills:  0            Where to get your medicines      These medications were sent to [id number] - [place], [place] - [address]  [address] [place] [address]     Phone:  [phone]     naproxen 500 MG tablet                Primary Care Provider Office Phone # Fax #    Saravanan Bocanegra -[phone] [address] [place]  [facility] [address]        Equal Access to Services     [facility]: Hadii aad ku hadasho Soomaali, waaxda luqadaha, qaybta kaalmada adeegyada, waxay stephan haymaryam ferro . So [facility] [phone].    ATENCIÓN: Si habla español, tiene a powers disposición servicios gratuitos de asistencia lingüística. [facility] [phone].    We comply with applicable federal civil rights laws and [place] laws. We do not discriminate on the basis of race, color, national origin, age, disability, sex, sexual orientation, or gender identity.            Thank you!     Thank you for choosing [facility]  for your care. Our goal is always to provide you with excellent care. Hearing back from our patients is one way we can continue to improve our services. Please take a few minutes to complete the written survey that you may receive in the mail after your visit with us. Thank you!             Your Updated Medication List - Protect others around you: Learn how to safely use, store and throw away your medicines at www.disposemymeds.org.          This list is accurate as of 6/19/18  9:45 AM.  Always use your most recent med list.                   Brand Name Dispense Instructions for use Diagnosis    aspirin 81 MG tablet     90 tablet    Take 81 mg by mouth as needed        atenolol-chlorthalidone 50-25 MG per tablet    TENORETIC    30 tablet    TAKE ONE TABLET BY MOUTH ONE TIME DAILY    Essential hypertension with goal blood  pressure less than 140/90       naproxen 500 MG tablet    NAPROSYN    20 tablet    Take 1 tablet (500 mg) by mouth 2 times daily as needed for moderate pain    Right lower quadrant abdominal tenderness with rebound tenderness       potassium chloride SA 10 MEQ CR tablet    K-DUR/KLOR-CON M    30 tablet    Take 1 tablet (10 mEq) by mouth daily    Essential hypertension

## 2018-06-20 ENCOUNTER — MYC MEDICAL ADVICE (OUTPATIENT)
Dept: FAMILY MEDICINE | Facility: CLINIC | Age: 53
End: 2018-06-20

## 2018-06-20 ENCOUNTER — RADIANT APPOINTMENT (OUTPATIENT)
Dept: ULTRASOUND IMAGING | Facility: CLINIC | Age: 53
End: 2018-06-20
Attending: NURSE PRACTITIONER
Payer: COMMERCIAL

## 2018-06-20 DIAGNOSIS — R10.823 RIGHT LOWER QUADRANT ABDOMINAL TENDERNESS WITH REBOUND TENDERNESS: ICD-10-CM

## 2018-06-20 DIAGNOSIS — K40.90 RIGHT INGUINAL HERNIA: Primary | ICD-10-CM

## 2018-06-20 PROCEDURE — 76705 ECHO EXAM OF ABDOMEN: CPT

## 2018-06-20 NOTE — PROGRESS NOTES
Thanh,  Your ultrasound results were normal. Please continue the plan of care as we discussed during our visit. Please let us know if you have any questions.  Thank you for allowing us to participate in your care.  RIKI Seth CNP

## 2018-06-26 ENCOUNTER — THERAPY VISIT (OUTPATIENT)
Dept: PHYSICAL THERAPY | Facility: CLINIC | Age: 53
End: 2018-06-26
Payer: COMMERCIAL

## 2018-06-26 ENCOUNTER — OFFICE VISIT (OUTPATIENT)
Dept: SURGERY | Facility: CLINIC | Age: 53
End: 2018-06-26
Payer: COMMERCIAL

## 2018-06-26 VITALS
HEART RATE: 49 BPM | DIASTOLIC BLOOD PRESSURE: 79 MMHG | SYSTOLIC BLOOD PRESSURE: 114 MMHG | HEIGHT: 67 IN | BODY MASS INDEX: 27.62 KG/M2 | WEIGHT: 176 LBS

## 2018-06-26 DIAGNOSIS — D17.1 BENIGN LIPOMATOUS NEOPLASM OF SKIN AND SUBCUTANEOUS TISSUE OF TRUNK: ICD-10-CM

## 2018-06-26 DIAGNOSIS — M25.561 RIGHT KNEE PAIN: ICD-10-CM

## 2018-06-26 DIAGNOSIS — Z98.890 S/P ARTHROSCOPIC PARTIAL MEDIAL MENISCECTOMY: ICD-10-CM

## 2018-06-26 DIAGNOSIS — Z87.19 S/P RIGHT INGUINAL HERNIA REPAIR: Primary | ICD-10-CM

## 2018-06-26 DIAGNOSIS — Z98.890 S/P RIGHT INGUINAL HERNIA REPAIR: Primary | ICD-10-CM

## 2018-06-26 PROCEDURE — 97110 THERAPEUTIC EXERCISES: CPT | Mod: GP | Performed by: PHYSICAL THERAPIST

## 2018-06-26 PROCEDURE — 97530 THERAPEUTIC ACTIVITIES: CPT | Mod: GP | Performed by: PHYSICAL THERAPIST

## 2018-06-26 PROCEDURE — 99214 OFFICE O/P EST MOD 30 MIN: CPT | Performed by: SURGERY

## 2018-06-26 NOTE — PROGRESS NOTES
Subjective:  HPI                    Objective:  System    Physical Exam    General     ROS    Assessment/Plan:    SUBJECTIVE  Subjective changes as noted by pt:  Patient reports that he has walked about 1/2 mile now. He does get tired and was sore and swollen after the walk He then did the knee extension manual stretches and his knee felt a lot better after that.        Current Pain level: 0/10   Changes in function:  Yes (See Goal flowsheet attached for changes in current functional level)     Adverse reaction to treatment or activity:  None    OBJECTIVE  Changes in objective findings:  Supine AAROM R knee: 0-2-WNL. Quad weakness persists, but control improving with step up exercise.         ASSESSMENT  Thanh continues to require intervention to meet STG and LTG's: PT  Slow improvement in quad strength/control and continued tightness in R knee extension.   Response to therapy has shown an improvement in  muscle control  Progress made towards STG/LTG?  Yes (See Goal flowsheet attached for updates on achievement of STG and LTG)    PLAN  Continue current treatment plan until patient demonstrates readiness to progress to higher level exercises.    PTA/ATC plan:  N/A    Please refer to the daily flowsheet for treatment today, total treatment time and time spent performing 1:1 timed codes.

## 2018-06-26 NOTE — MR AVS SNAPSHOT
After Visit Summary   6/26/2018    Thanh Loaiza    MRN: [id number]           Patient Information     Date Of Birth          1965        Visit Information        Provider Department      6/26/2018 8:10 AM Suzi White, PT [facility]        Today's Diagnoses     Right knee pain        S/P arthroscopic partial medial meniscectomy           Follow-ups after your visit        Your next 10 appointments already scheduled     Jul 02, 2018  7:40 AM CDT   SYDNEE Extremity with Suzi White PT   [facility] ([place] [place]  )    [address]  [facility] [address]   [phone]            Jul 10, 2018  8:10 AM CDT   SYDNEE Extremity with Suzi White PT   [facility] ([facility] [place]  )    [address]  [facility] [address]   [phone]            Jul 17, 2018  8:10 AM CDT   SYDNEE Extremity with Suzi White PT   [facility] ([place] [place]  )    [address]  [facility] [address]   [phone]            Jul 26, 2018  7:40 AM CDT   SYDNEE Extremity with Suzi White PT   [facility] ([place] [place]  )    [address] [facility] [address]   [phone]            Jul 31, 2018  8:10 AM CDT   SYDNEE Extremity with Suzi White PT   [facility] ([place] [place]  )    [address] [facility] [address]   [phone]              Who to contact     If you have questions or need follow up information about today's clinic visit or your schedule please contact [facility] directly at [phone].  Normal or non-critical lab and imaging results will be communicated to you by MyChart, letter or phone within 4 business days after the clinic has received the results. If you do not hear from us within 7  days, please contact the clinic through InGaugeIt or phone. If you have a critical or abnormal lab result, we will notify you by phone as soon as possible.  Submit refill requests through InGaugeIt or call your pharmacy and they will forward the refill request to us. Please allow 3 business days for your refill to be completed.          Additional Information About Your Visit        OneMobhart Information     InGaugeIt gives you secure access to your electronic health record. If you see a primary care provider, you can also send messages to your care team and make appointments. If you have questions, please call your primary care clinic.  If you do not have a primary care provider, please call [phone] and they will assist you.        Care EveryWhere ID     This is your Care EveryWhere ID. This could be used by other organizations to access your [facility] medical records  [id number]         Blood Pressure from Last 3 Encounters:   06/26/18 114/79   06/19/18 111/75   06/13/18 114/77    Weight from Last 3 Encounters:   06/26/18 79.8 kg (176 lb)   06/19/18 81.6 kg (180 lb)   06/13/18 81.9 kg (180 lb 9.6 oz)              We Performed the Following     Therapeutic Activities     Therapeutic Exercises        Primary Care Provider Office Phone # Fax #    Saravanan Bocanegra -[phone] [address]  [facility] [address]        Equal Access to Services     CAMPBELL FORD : Hadii aad ku hadasho Soomaali, waaxda luqadaha, qaybta kaalmada margret, corwin rolon. So [facility] [phone].    ATENCIÓN: Si habla español, tiene a powers disposición servicios gratuitos de asistencia lingüística. Lorene al [phone].    We comply with applicable federal civil rights laws and [place] laws. We do not discriminate on the basis of race, color, national origin, age, disability, sex, sexual orientation, or gender identity.            Thank you!     Thank you for choosing [facility]  for your care. Our goal is always to provide you with excellent care. Hearing back from our patients is one way we can continue to improve our services. Please take a few minutes to complete the written survey that you may receive in the mail after your visit with us. Thank you!             Your Updated Medication List - Protect others around you: Learn how to safely use, store and throw away your medicines at www.disposemymeds.org.          This list is accurate as of 6/26/18 11:01 PM.  Always use your most recent med list.                   Brand Name Dispense Instructions for use Diagnosis    aspirin 81 MG tablet     90 tablet    Take 81 mg by mouth as needed        atenolol-chlorthalidone 50-25 MG per tablet    TENORETIC    30 tablet    TAKE ONE TABLET BY MOUTH ONE TIME DAILY    Essential hypertension with goal blood pressure less than 140/90       naproxen 500 MG tablet    NAPROSYN    20 tablet    Take 1 tablet (500 mg) by mouth 2 times daily as needed for moderate pain    Right lower quadrant abdominal tenderness with rebound tenderness       potassium chloride SA 10 MEQ CR tablet    K-DUR/KLOR-CON M    30 tablet    Take 1 tablet (10 mEq) by mouth daily    Essential hypertension

## 2018-06-26 NOTE — PATIENT INSTRUCTIONS
Does have lipomas so if wants them out can do them in the office  Assessment: had right inguinal hernia repair with out  mesh    Plan to do should be able to go back to normal activities by 6 weeks.

## 2018-06-26 NOTE — LETTER
"    6/26/2018         RE: Thanh Loaiza  [address]  [place] [address]        Dear Colleague,    Thank you for referring your patient, Thanh Loaiza, to the [facility]. Please see a copy of my visit note below.    Dear Saravanan Connell  I was asked to see this patient by Saravanan Bocanegra for please see below.  I have seen Thanh Loaiza and as you know his chief complaint is right inguinal hernia repair and is wondering if it is healing properly  Did not use mesh.         HPI:  Patient is a 52 year old male  with complaints see above    Review Of Systems  Respiratory: No shortness of breath, dyspnea on exertion, cough, or hemoptysis  Cardiovascular: negative  Gastrointestinal: negative  Endocrine: negative  :  negative  /79  Pulse (!) 49  Ht 1.702 m (5' 7\")  Wt 79.8 kg (176 lb)  BMI 27.57 kg/m2    Past Medical History:   Diagnosis Date     DDD (degenerative disc disease), lumbar      DJD (degenerative joint disease), lumbar      Hypertension goal BP (blood pressure) < 140/90 12/14/2010       Past Surgical History:   Procedure Laterality Date     ARTHROSCOPY KNEE Right 3/16/2018    Procedure: ARTHROSCOPY KNEE;  Right knee arthroscopy, debridement ;  Surgeon: Dale Lester MD;  Location: [facility] OR     COLONOSCOPY  10/14/2011    Procedure:COLONOSCOPY; Colonoscopy, change in stools, diarrhea; Surgeon:ALEIDA DELEON; Location:[facility] OR     COLONOSCOPY  10/14/2011    Procedure:COMBINED COLONOSCOPY, SINGLE BIOPSY/POLYPECTOMY BY BIOPSY; Surgeon:ALEIDA DELEON; Location:[facility]     ORTHOPEDIC SURGERY       TONSILLECTOMY      age 11 years       Social History     Social History     Marital status:      Spouse name: N/A     Number of children: N/A     Years of education: N/A     Occupational History     Not on file.     Social History Main Topics     Smoking status: Never Smoker     Smokeless tobacco: Never Used     Alcohol use No     Drug use: No     Sexual activity: Yes     Partners: " "Female     Other Topics Concern     Parent/Sibling W/ Cabg, Mi Or Angioplasty Before 65f 55m? No     Social History Narrative       Current Outpatient Prescriptions   Medication Sig Dispense Refill     aspirin 81 MG tablet Take 81 mg by mouth as needed  90 tablet 3     atenolol-chlorthalidone (TENORETIC) 50-25 MG per tablet TAKE ONE TABLET BY MOUTH ONE TIME DAILY 30 tablet 11     potassium chloride SA (K-DUR/KLOR-CON M) 10 MEQ CR tablet Take 1 tablet (10 mEq) by mouth daily 30 tablet 1     naproxen (NAPROSYN) 500 MG tablet Take 1 tablet (500 mg) by mouth 2 times daily as needed for moderate pain 20 tablet 0       10 Point review of systems all others are negative.   Above was reviewed  Physical exam: /79  Pulse (!) 49  Ht 1.702 m (5' 7\")  Wt 79.8 kg (176 lb)  BMI 27.57 kg/m2   Patient able to get up on table without difficulty.   Patient is alert and orientated.   Head eyes, nose and mouth within normal limits.    Abdomen is abdomen is soft without significant tenderness, masses, organomegaly or guarding  bowel sounds are positive and no caput medusa noted.  No obvious reucurrent hernias noted.  Wound looks good      Skin was warm and pink  Normal Affect      Does have lipomas so if wants them out can do them in the office  Assessment: had right inguinal hernia repair with out  mesh    Plan to do should be able to go back to normal activities by 6 weeks. .    Time spent with the patient with greater that 50% of the time in discussion was 30 minutes.  In discussing the hernia repair and that mesh  has lower recurrence rate. .      Paul Freitas MD      Again, thank you for allowing me to participate in the care of your patient.        Sincerely,        Paul Freitas MD    "

## 2018-06-26 NOTE — MR AVS SNAPSHOT
After Visit Summary   6/26/2018    Thanh Loaiza    MRN: [id number]           Patient Information     Date Of Birth          1965        Visit Information        Provider Department      6/26/2018 1:00 PM Paul Freitas MD [facility]        Care Instructions    Does have lipomas so if wants them out can do them in the office  Assessment: had right inguinal hernia repair with out  mesh    Plan to do should be able to go back to normal activities by 6 weeks.            Follow-ups after your visit        Your next 10 appointments already scheduled     Jul 02, 2018  7:40 AM CDT   SYDNEE Extremity with Suzi White, PT   [facility] [place] ([facility] [place]  )    [address]  [place] [address]   [phone]            Jul 10, 2018  8:10 AM CDT   SYDNEE Extremity with Suzi White PT   [facility] [place] ([facility] [place]  )    [address]  [place] [id number] [phone]            Jul 17, 2018  8:10 AM CDT   SYDNEE Extremity with Suzi White PT   [facility] [place] ([facility] [place]  )    [address]  [place] [address]   [phone]            Jul 26, 2018  7:40 AM CDT   SYDNEE Extremity with Suzi White PT   [facility] [place] ([facility] [place]  )    [address]  [place] [address]   [phone]            Jul 31, 2018  8:10 AM CDT   SYDNEE Extremity with Suzi White PT   [facility] [place] ([facility] [place]  )    [address]  [facility] [id number] [phone]              Who to contact     If you have questions or need follow up information about today's clinic visit or your schedule please contact [facility] directly at [phone].  Normal or non-critical lab and imaging results will be communicated to you by MyChart, letter or phone within  "4 business days after the clinic has received the results. If you do not hear from us within 7 days, please contact the clinic through Conjecta or phone. If you have a critical or abnormal lab result, we will notify you by phone as soon as possible.  Submit refill requests through Conjecta or call your pharmacy and they will forward the refill request to us. Please allow 3 business days for your refill to be completed.          Additional Information About Your Visit        Reward GatewayharSheZoom Information     Conjecta gives you secure access to your electronic health record. If you see a primary care provider, you can also send messages to your care team and make appointments. If you have questions, please call your primary care clinic.  If you do not have a primary care provider, please call [phone] and they will assist you.        Care EveryWhere ID     This is your Care EveryWhere ID. This could be used by other organizations to access your [facility] medical records  [id number]        Your Vitals Were     Pulse Height BMI (Body Mass Index)             49 1.702 m (5' 7\") 27.57 kg/m2          Blood Pressure from Last 3 Encounters:   06/26/18 114/79   06/19/18 111/75   06/13/18 114/77    Weight from Last 3 Encounters:   06/26/18 79.8 kg (176 lb)   06/19/18 81.6 kg (180 lb)   06/13/18 81.9 kg (180 lb 9.6 oz)              Today, you had the following     No orders found for display       Primary Care Provider Office Phone # Fax #    Saravanan Bocanegra -[phone]       [address]  [facility] [address]        Equal Access to Services     [facility]: Hadii aad ku hadasho Soomaali, waaxda luqadaha, qaybta kaalmada margret, corwin ferro . So [facility] [phone].    ATENCIÓN: Si habla español, tiene a powers disposición servicios gratuitos de asistencia lingüística. Llame al [phone].    We comply with applicable federal civil rights laws and [place] laws. We do not discriminate on " the basis of race, color, national origin, age, disability, sex, sexual orientation, or gender identity.            Thank you!     Thank you for choosing [facility]  for your care. Our goal is always to provide you with excellent care. Hearing back from our patients is one way we can continue to improve our services. Please take a few minutes to complete the written survey that you may receive in the mail after your visit with us. Thank you!             Your Updated Medication List - Protect others around you: Learn how to safely use, store and throw away your medicines at www.disposemymeds.org.          This list is accurate as of 6/26/18  1:22 PM.  Always use your most recent med list.                   Brand Name Dispense Instructions for use Diagnosis    aspirin 81 MG tablet     90 tablet    Take 81 mg by mouth as needed        atenolol-chlorthalidone 50-25 MG per tablet    TENORETIC    30 tablet    TAKE ONE TABLET BY MOUTH ONE TIME DAILY    Essential hypertension with goal blood pressure less than 140/90       naproxen 500 MG tablet    NAPROSYN    20 tablet    Take 1 tablet (500 mg) by mouth 2 times daily as needed for moderate pain    Right lower quadrant abdominal tenderness with rebound tenderness       potassium chloride SA 10 MEQ CR tablet    K-DUR/KLOR-CON M    30 tablet    Take 1 tablet (10 mEq) by mouth daily    Essential hypertension

## 2018-06-26 NOTE — PROGRESS NOTES
"Dear Saravanan Connell  I was asked to see this patient by Saravanan Bocanegra for please see below.  I have seen Thanh Loaiza and as you know his chief complaint is right inguinal hernia repair and is wondering if it is healing properly  Did not use mesh.         HPI:  Patient is a 52 year old male  with complaints see above    Review Of Systems  Respiratory: No shortness of breath, dyspnea on exertion, cough, or hemoptysis  Cardiovascular: negative  Gastrointestinal: negative  Endocrine: negative  :  negative  /79  Pulse (!) 49  Ht 1.702 m (5' 7\")  Wt 79.8 kg (176 lb)  BMI 27.57 kg/m2    Past Medical History:   Diagnosis Date     DDD (degenerative disc disease), lumbar      DJD (degenerative joint disease), lumbar      Hypertension goal BP (blood pressure) < 140/90 12/14/2010       Past Surgical History:   Procedure Laterality Date     ARTHROSCOPY KNEE Right 3/16/2018    Procedure: ARTHROSCOPY KNEE;  Right knee arthroscopy, debridement ;  Surgeon: Dale Lester MD;  Location: [facility]     COLONOSCOPY  10/14/2011    Procedure:COLONOSCOPY; Colonoscopy, change in stools, diarrhea; Surgeon:ALEIDA DELEON; Location:[facility]     COLONOSCOPY  10/14/2011    Procedure:COMBINED COLONOSCOPY, SINGLE BIOPSY/POLYPECTOMY BY BIOPSY; Surgeon:ALEIDA DELEON; Location:[facility]     ORTHOPEDIC SURGERY       TONSILLECTOMY      age 11 years       Social History     Social History     Marital status:      Spouse name: N/A     Number of children: N/A     Years of education: N/A     Occupational History     Not on file.     Social History Main Topics     Smoking status: Never Smoker     Smokeless tobacco: Never Used     Alcohol use No     Drug use: No     Sexual activity: Yes     Partners: Female     Other Topics Concern     Parent/Sibling W/ Cabg, Mi Or Angioplasty Before 65f 55m? No     Social History Narrative       Current Outpatient Prescriptions   Medication Sig Dispense Refill     aspirin 81 MG tablet Take 81 mg by mouth as " "needed  90 tablet 3     atenolol-chlorthalidone (TENORETIC) 50-25 MG per tablet TAKE ONE TABLET BY MOUTH ONE TIME DAILY 30 tablet 11     potassium chloride SA (K-DUR/KLOR-CON M) 10 MEQ CR tablet Take 1 tablet (10 mEq) by mouth daily 30 tablet 1     naproxen (NAPROSYN) 500 MG tablet Take 1 tablet (500 mg) by mouth 2 times daily as needed for moderate pain 20 tablet 0       10 Point review of systems all others are negative.   Above was reviewed  Physical exam: /79  Pulse (!) 49  Ht 1.702 m (5' 7\")  Wt 79.8 kg (176 lb)  BMI 27.57 kg/m2   Patient able to get up on table without difficulty.   Patient is alert and orientated.   Head eyes, nose and mouth within normal limits.    Abdomen is abdomen is soft without significant tenderness, masses, organomegaly or guarding  bowel sounds are positive and no caput medusa noted.  No obvious reucurrent hernias noted.  Wound looks good      Skin was warm and pink  Normal Affect      Does have lipomas so if wants them out can do them in the office  Assessment: had right inguinal hernia repair with out  mesh    Plan to do should be able to go back to normal activities by 6 weeks. .    Time spent with the patient with greater that 50% of the time in discussion was 30 minutes.  In discussing the hernia repair and that mesh  has lower recurrence rate. .      Paul Freitas MD    "

## 2018-07-09 ENCOUNTER — MYC MEDICAL ADVICE (OUTPATIENT)
Dept: FAMILY MEDICINE | Facility: CLINIC | Age: 53
End: 2018-07-09

## 2018-07-10 ENCOUNTER — THERAPY VISIT (OUTPATIENT)
Dept: PHYSICAL THERAPY | Facility: CLINIC | Age: 53
End: 2018-07-10
Payer: COMMERCIAL

## 2018-07-10 DIAGNOSIS — M25.561 RIGHT KNEE PAIN: ICD-10-CM

## 2018-07-10 DIAGNOSIS — Z98.890 S/P ARTHROSCOPIC PARTIAL MEDIAL MENISCECTOMY: ICD-10-CM

## 2018-07-10 PROCEDURE — 97110 THERAPEUTIC EXERCISES: CPT | Mod: GP | Performed by: PHYSICAL THERAPIST

## 2018-07-10 PROCEDURE — 97530 THERAPEUTIC ACTIVITIES: CPT | Mod: GP | Performed by: PHYSICAL THERAPIST

## 2018-07-10 NOTE — PROGRESS NOTES
[facility] Initial Evaluation  Subjective:  HPI                    Objective:  System    Physical Exam    General     ROS    Assessment/Plan:    SUBJECTIVE  Subjective changes as noted by pt:  Patient reports that he is doing better overall, but still has time where he feels like something catches in his knee.     Current Pain level: 0/10   Changes in function:  Yes (See Goal flowsheet attached for changes in current functional level)     Adverse reaction to treatment or activity:  None    OBJECTIVE  Changes in objective findings:  Patient still has tightness in full R knee extension. Patient still unable to do step down due to quad weakness. Able to do unsupported knee bends with lots of cues, but unable to do single leg squat with support.          ASSESSMENT  Thanh continues to require intervention to meet STG and LTG's: PT  Patient is progressing as expected.  Response to therapy has shown an improvement in  strength  Progress made towards STG/LTG?  Yes (See Goal flowsheet attached for updates on achievement of STG and LTG)    PLAN  Current treatment program is being advanced to more complex exercises.    PTA/ATC plan:  N/A    Please refer to the daily flowsheet for treatment today, total treatment time and time spent performing 1:1 timed codes.

## 2018-07-10 NOTE — MR AVS SNAPSHOT
After Visit Summary   7/10/2018    Thanh Loaiza    MRN: [id number]           Patient Information     Date Of Birth          1965        Visit Information        Provider Department      7/10/2018 8:10 AM Suzi White, PT [facility]        Today's Diagnoses     Right knee pain        S/P arthroscopic partial medial meniscectomy           Follow-ups after your visit        Your next 10 appointments already scheduled     Jul 17, 2018  8:10 AM CDT   SYDNEE Extremity with Suzi White PT   [facility] ([facility]  )    [address] [facility] [address]   [phone]            Jul 26, 2018  7:40 AM CDT   SYDNEE Extremity with Suzi White PT   [facility] ([facility]  )    [address] [facility] [address]   [phone]            Jul 31, 2018  8:10 AM CDT   SYDNEE Extremity with Suzi White PT   [facility] ([facility]  )    [address]  [place] [address]   [phone]              Who to contact     If you have questions or need follow up information about today's clinic visit or your schedule please contact [facility] directly at [phone].  Normal or non-critical lab and imaging results will be communicated to you by MyChart, letter or phone within 4 business days after the clinic has received the results. If you do not hear from us within 7 days, please contact the clinic through Noemalifehart or phone. If you have a critical or abnormal lab result, we will notify you by phone as soon as possible.  Submit refill requests through Call Loop or call your pharmacy and they will forward the refill request to us. Please allow 3 business days for your refill to be completed.          Additional Information About Your Visit        MyChart Information     [employer] gives you  secure access to your electronic health record. If you see a primary care provider, you can also send messages to your care team and make appointments. If you have questions, please call your primary care clinic.  If you do not have a primary care provider, please call [phone] and they will assist you.        Care EveryWhere ID     This is your Care EveryWhere ID. This could be used by other organizations to access your [place] medical records  [id number]         Blood Pressure from Last 3 Encounters:   06/26/18 114/79   06/19/18 111/75   06/13/18 114/77    Weight from Last 3 Encounters:   06/26/18 79.8 kg (176 lb)   06/19/18 81.6 kg (180 lb)   06/13/18 81.9 kg (180 lb 9.6 oz)              We Performed the Following     Therapeutic Activities     Therapeutic Exercises        Primary Care Provider Office Phone # Fax #    Saravanan Bocanegra [phone]       [address]  [facility] [address]        Equal Access to Services     CAMPBELL FORD : Hadii aad ku hadasho Soomaali, waaxda luqadaha, qaybta kaalmada adeegyada, waxay idiin hayaan yg kharash ksaie . So [facility] [phone].    ATENCIÓN: Si habla español, tiene a powers disposición servicios gratuitos de asistencia lingüística. [facility] [phone].    We comply with applicable federal civil rights laws and [place] laws. We do not discriminate on the basis of race, color, national origin, age, disability, sex, sexual orientation, or gender identity.            Thank you!     Thank you for choosing [facility]  for your care. Our goal is always to provide you with excellent care. Hearing back from our patients is one way we can continue to improve our services. Please take a few minutes to complete the written survey that you may receive in the mail after your visit with us. Thank you!             Your Updated Medication List - Protect others around you: Learn how to safely use, store and throw away your  medicines at www.disposemymeds.org.          This list is accurate as of 7/10/18 11:59 PM.  Always use your most recent med list.                   Brand Name Dispense Instructions for use Diagnosis    aspirin 81 MG tablet     90 tablet    Take 81 mg by mouth as needed        atenolol-chlorthalidone 50-25 MG per tablet    TENORETIC    30 tablet    TAKE ONE TABLET BY MOUTH ONE TIME DAILY    Essential hypertension with goal blood pressure less than 140/90       naproxen 500 MG tablet    NAPROSYN    20 tablet    Take 1 tablet (500 mg) by mouth 2 times daily as needed for moderate pain    Right lower quadrant abdominal tenderness with rebound tenderness       potassium chloride SA 10 MEQ CR tablet    K-DUR/KLOR-CON M    30 tablet    Take 1 tablet (10 mEq) by mouth daily    Essential hypertension

## 2018-07-17 ENCOUNTER — MYC MEDICAL ADVICE (OUTPATIENT)
Dept: FAMILY MEDICINE | Facility: CLINIC | Age: 53
End: 2018-07-17

## 2018-07-19 ENCOUNTER — MYC MEDICAL ADVICE (OUTPATIENT)
Dept: FAMILY MEDICINE | Facility: CLINIC | Age: 53
End: 2018-07-19

## 2018-07-19 DIAGNOSIS — R10.13 ABDOMINAL PAIN, EPIGASTRIC: Primary | ICD-10-CM

## 2018-07-25 ENCOUNTER — OFFICE VISIT (OUTPATIENT)
Dept: FAMILY MEDICINE | Facility: CLINIC | Age: 53
End: 2018-07-25
Payer: COMMERCIAL

## 2018-07-25 ENCOUNTER — TELEPHONE (OUTPATIENT)
Dept: PEDIATRICS | Facility: CLINIC | Age: 53
End: 2018-07-25

## 2018-07-25 ENCOUNTER — RADIANT APPOINTMENT (OUTPATIENT)
Dept: GENERAL RADIOLOGY | Facility: CLINIC | Age: 53
End: 2018-07-25
Attending: NURSE PRACTITIONER
Payer: COMMERCIAL

## 2018-07-25 ENCOUNTER — MYC MEDICAL ADVICE (OUTPATIENT)
Dept: FAMILY MEDICINE | Facility: CLINIC | Age: 53
End: 2018-07-25

## 2018-07-25 VITALS
DIASTOLIC BLOOD PRESSURE: 72 MMHG | BODY MASS INDEX: 27.62 KG/M2 | TEMPERATURE: 97.7 F | WEIGHT: 176 LBS | SYSTOLIC BLOOD PRESSURE: 108 MMHG | OXYGEN SATURATION: 98 % | HEIGHT: 67 IN | HEART RATE: 75 BPM | RESPIRATION RATE: 20 BRPM

## 2018-07-25 DIAGNOSIS — M25.571 ACUTE RIGHT ANKLE PAIN: Primary | ICD-10-CM

## 2018-07-25 DIAGNOSIS — M25.571 ACUTE RIGHT ANKLE PAIN: ICD-10-CM

## 2018-07-25 DIAGNOSIS — M10.9 ACUTE GOUT OF RIGHT ANKLE, UNSPECIFIED CAUSE: Primary | ICD-10-CM

## 2018-07-25 LAB — URATE SERPL-MCNC: 9.4 MG/DL (ref 3.5–7.2)

## 2018-07-25 PROCEDURE — 36415 COLL VENOUS BLD VENIPUNCTURE: CPT | Performed by: NURSE PRACTITIONER

## 2018-07-25 PROCEDURE — 84550 ASSAY OF BLOOD/URIC ACID: CPT | Performed by: NURSE PRACTITIONER

## 2018-07-25 PROCEDURE — 73610 X-RAY EXAM OF ANKLE: CPT | Mod: RT

## 2018-07-25 PROCEDURE — 99213 OFFICE O/P EST LOW 20 MIN: CPT | Performed by: NURSE PRACTITIONER

## 2018-07-25 RX ORDER — INDOMETHACIN 25 MG/1
25 CAPSULE ORAL
Qty: 30 CAPSULE | Refills: 0 | Status: SHIPPED | OUTPATIENT
Start: 2018-07-25 | End: 2018-12-27

## 2018-07-25 ASSESSMENT — PAIN SCALES - GENERAL: PAINLEVEL: EXTREME PAIN (8)

## 2018-07-25 NOTE — PROGRESS NOTES
Thanh,  Your x-ray results were normal - we're still waiting on the uric acid level. Please continue plan of care as we discussed this morning for now. Please let us know if you have any questions.  Thank you for allowing us to participate in your care.  RIKI Seth CNP

## 2018-07-25 NOTE — PROGRESS NOTES
SUBJECTIVE:   Thanh Loaiza is a 52 year old male who presents to clinic today for the following health issues:      Joint Pain    Onset: 6 days ago    Description:   Location: right ankle  Character: Stabbing and throbbing pain    Intensity: severe    Progression of Symptoms: same    Accompanying Signs & Symptoms:  Other symptoms: swelling at night    History:   Previous similar pain: no       Precipitating factors:   Trauma or overuse: no     Alleviating factors:  Improved by: NSAID - advil and ice    Therapies Tried and outcome: Advil and ice        52 year old male presents with the above concerns. Right lateral ankle pain x6 days. No known injury. Started after wearing new dress shoes grocery shopping. Using crutches to walk as well as advil and ice. No numbness, tingling or weakness. Had knee surgery 3 months ago and continues to go to physical therapy.    Problem list and histories reviewed & adjusted, as indicated.  Additional history: as documented    Patient Active Problem List   Diagnosis     Erectile dysfunction     CARDIOVASCULAR SCREENING; LDL GOAL LESS THAN 130     Disorder of sacrum     Lumbago     Essential hypertension     DJD (degenerative joint disease), lumbar     Cataract extraction status of right eye     Gastroesophageal reflux disease without esophagitis     H. pylori infection     Complex tear of medial meniscus of right knee as current injury, subsequent encounter     Chondromalacia of right knee     S/P arthroscopy of right knee     Right knee pain     S/P arthroscopic partial medial meniscectomy     External hemorrhoids     S/P right inguinal hernia repair     Benign lipomatous neoplasm of skin and subcutaneous tissue of trunk     Past Surgical History:   Procedure Laterality Date     ARTHROSCOPY KNEE Right 3/16/2018    Procedure: ARTHROSCOPY KNEE;  Right knee arthroscopy, debridement ;  Surgeon: Dale Lester MD;  Location: [facility] OR     COLONOSCOPY  10/14/2011     "Procedure:COLONOSCOPY; Colonoscopy, change in stools, diarrhea; Surgeon:ALEIDA DELEON; Location:[facility] OR     COLONOSCOPY  10/14/2011    Procedure:COMBINED COLONOSCOPY, SINGLE BIOPSY/POLYPECTOMY BY BIOPSY; Surgeon:ALEIDA DELEON; Location:[facility] OR     ORTHOPEDIC SURGERY       TONSILLECTOMY      age 11 years       Social History   Substance Use Topics     Smoking status: Never Smoker     Smokeless tobacco: Never Used     Alcohol use No     Family History   Problem Relation Age of Onset     Hypertension Mother      Diabetes Father      Hypertension Father      Hypertension Brother          Current Outpatient Prescriptions   Medication Sig Dispense Refill     aspirin 81 MG tablet Take 81 mg by mouth as needed  90 tablet 3     atenolol-chlorthalidone (TENORETIC) 50-25 MG per tablet TAKE ONE TABLET BY MOUTH ONE TIME DAILY 30 tablet 11     potassium chloride SA (K-DUR/KLOR-CON M) 10 MEQ CR tablet Take 1 tablet (10 mEq) by mouth daily 30 tablet 1     naproxen (NAPROSYN) 500 MG tablet Take 1 tablet (500 mg) by mouth 2 times daily as needed for moderate pain (Patient not taking: Reported on 7/25/2018) 20 tablet 0     No Known Allergies    Reviewed and updated as needed this visit by clinical staff  Tobacco  Allergies  Meds  Problems       Reviewed and updated as needed this visit by Provider  Allergies  Meds  Problems         ROS:  Constitutional, HEENT, cardiovascular, pulmonary, gi and gu systems are negative, except as otherwise noted.    OBJECTIVE:     /72 (BP Location: Right arm, Patient Position: Chair, Cuff Size: Adult Regular)  Pulse 75  Temp 97.7  F (36.5  C) (Oral)  Resp 20  Ht 5' 7\" (1.702 m)  Wt 176 lb (79.8 kg)  SpO2 98%  BMI 27.57 kg/m2  Body mass index is 27.57 kg/(m^2).  GENERAL: healthy, alert and no distress  MS: no gross musculoskeletal defects noted, no edema. CMS intact distally. Cap refill <2 sec. Tenderness to right lateral malleolus with mild swelling. Ambulating with crutches. Unsteady " gait  SKIN: no suspicious lesions or rashes. No erythema or ecchymosis  PSYCH: mentation appears normal, affect normal/bright    Diagnostic Test Results:  Xray - right ankle: normal on my read. Awaiting radiology over read    Uric acid: pending    ASSESSMENT/PLAN:     1. Acute right ankle pain  - XR Ankle Right G/E 3 Views; Future  - Uric acid  X-ray is normal on my read. We will notify you with radiology's over read  Uric acid level is pending  Ice 15 minutes 4-6 times daily  Ibuprofen or tylenol as needed for pain  Elevate your ankle while resting  Use crutches as needed. Weight bearing as tolerated. In a few days it's important to start putting some weight on it again  Further plan pending results  Follow up with primary care provider as needed    See Patient Instructions    The benefits, risks and potential side effects were discussed in detail. Black box warnings discussed as relevant. All patient questions were answered. The patient was instructed to follow up immediately if any adverse reactions develop.    Patient verbalizes understanding and agrees with plan of care. Patient stable for discharge.      RIKI Seth [facility]

## 2018-07-25 NOTE — PATIENT INSTRUCTIONS
X-ray is normal on my read. We will notify you with radiology's over read  Uric acid level is pending  Ice 15 minutes 4-6 times daily  Ibuprofen or tylenol as needed for pain  Elevate your ankle while resting  Use crutches as needed. Weight bearing as tolerated. In a few days it's important to start putting some weight on it again  Further plan pending results  Follow up with primary care provider as needed      At [facility], we strive to deliver an exceptional experience to you, every time we see you.  If you receive a survey in the mail, please send us back your thoughts. We really do value your feedback.    Based on your medical history, these are the current health maintenance/preventive care services that you are due for (some may have been done at this visit.)  Health Maintenance Due   Topic Date Due     HIV SCREEN (SYSTEM ASSIGNED)  08/27/1981         Suggested websites for health information:  Www.Scoopshot : Up to date and easily searchable information on multiple topics.  Www.medlineplus.gov : medication info, interactive tutorials, watch real surgeries online  Www.familydoctor.org : good info from the Academy of Family Physicians  Www.cdc.gov : public health info, travel advisories, epidemics (H1N1)  Www.aap.org : children's health info, normal development, vaccinations  Www.health.state.[place].us : [place] dept of health, public health issues in [place], N1N1    Your care team:                            Family Medicine Internal Medicine   MD Alexy Giraldo MD Shantel Branch-Fleming, MD Katya Georgiev PA-C Nam Ho, MD Pediatrics   GOPI Gale, MD Martha Cuellar CNP, MD Deborah Mielke, MD Kim Thein, RIKI CNP      Clinic hours: Monday - Thursday 7 am-7 pm; Fridays 7 am-5 pm.   Urgent care: Monday - Friday 11 am-9 pm; Saturday and Sunday 9 am-5 pm.  Pharmacy : Monday -Thursday 8 am-8 pm; Friday 8 am-6  pm; Saturday and Sunday 9 am-5 pm.     Clinic: [phone]   Pharmacy: [phone]

## 2018-07-25 NOTE — PROGRESS NOTES
Please CALL patient and be sure he got the following message:  Thanh,  Your labs came back abnormal - your uric acid level is elevated. Let's start medication for gout. Start indomethacin - I will send to the pharmacy. Do not take ibuprofen/Advil/naproxen/Aleve while taking this medication. Avoid alcohol and foods high in purine (red meat, turkey and wild game, organ meats, seafood) as well as drinks/foods with high fructose corn syrup. A nurse will also be calling to be sure you got this message.  Sincerely,  RIKI Seth CNP

## 2018-07-25 NOTE — TELEPHONE ENCOUNTER
Normal uric acid level is 3.5-7.2. He is at 9.4.    His elevated uric acid could be related to his BP medication but not likely the potassium. He should follow up with his primary care provider regarding gout possibly related to his BP medication and they can decide together if he should switch medications.

## 2018-07-25 NOTE — MR AVS SNAPSHOT
After Visit Summary   7/25/2018    Thanh Loaiza    MRN: [id number]           Patient Information     Date Of Birth          1965        Visit Information        Provider Department      7/25/2018 7:40 AM Jolie Perez APRN CNP [facility]        Today's Diagnoses     Acute right ankle pain    -  1      Care Instructions    X-ray is normal on my read. We will notify you with radiology's over read  Uric acid level is pending  Ice 15 minutes 4-6 times daily  Ibuprofen or tylenol as needed for pain  Elevate your ankle while resting  Use crutches as needed. Weight bearing as tolerated. In a few days it's important to start putting some weight on it again  Further plan pending results  Follow up with primary care provider as needed      At [facility], we strive to deliver an exceptional experience to you, every time we see you.  If you receive a survey in the mail, please send us back your thoughts. We really do value your feedback.    Based on your medical history, these are the current health maintenance/preventive care services that you are due for (some may have been done at this visit.)  Health Maintenance Due   Topic Date Due     HIV SCREEN (SYSTEM ASSIGNED)  08/27/1981         Suggested websites for health information:  Www.Secure Outcomes.[employer] : Up to date and easily searchable information on multiple topics.  Www.medlineplus.gov : medication info, interactive tutorials, watch real surgeries online  Www.familydoctor.org : good info from the Academy of Family Physicians  Www.cdc.gov : public health info, travel advisories, epidemics (H1N1)  Www.aap.org : children's health info, normal development, vaccinations  Www.health.state.[place].us : [place] dept of health, public health issues in [place], N1N1    Your care team:                            Family Medicine Internal Medicine   MD Alexy Giraldo MD Shantel Branch-Fleming, MD Katya Georgiev PA-C Nam  MD Daljit Pediatrics   GOPI Gale, MD Martha Cuellar CNP, MD Deborah Mielke, MD Kim Thein, APRN CNP      Clinic hours: Monday - Thursday 7 am-7 pm; Fridays 7 am-5 pm.   Urgent care: Monday - Friday 11 am-9 pm; Saturday and Sunday 9 am-5 pm.  Pharmacy : Monday -Thursday 8 am-8 pm; Friday 8 am-6 pm; Saturday and Sunday 9 am-5 pm.     Clinic: [phone]   Pharmacy: [phone]            Follow-ups after your visit        Your next 10 appointments already scheduled     Jul 31, 2018  8:10 AM CDT   SYDNEE Extremity with Suzi White, PT   [facility] ([facility] [place]  )    [address] [facility] [address] [phone]            Aug 03, 2018  9:30 AM CDT   CT CALCIUM SCREENING with MGCT1, MG IMAGING NURSE   [facility] ([facility])    [address] [place] [address] [phone]           It is best to avoid caffeine on the day of your test.  Be sure to tell your doctor:   If there s any chance you are pregnant.   If you have any special needs.  Please wear loose clothing, such as a sweat suit or jogging clothes. Avoid snaps, zippers and other metal. We may ask you to undress and put on a hospital gown.  If you have any questions, please call the Imaging Department where you will have your exam.              Who to contact     If you have questions or need follow up information about today's clinic visit or your schedule please contact [facility] directly at [phone].  Normal or non-critical lab and imaging results will be communicated to you by MyChart, letter or phone within 4 business days after the clinic has received the results. If you do not hear from us within 7 days, please contact the clinic through MyChart or phone. If you have a critical or abnormal lab result, we will notify you by phone as soon as  "possible.  Submit refill requests through [employer] or call your pharmacy and they will forward the refill request to us. Please allow 3 business days for your refill to be completed.          Additional Information About Your Visit        BAASBOXhart Information     [employer] gives you secure access to your electronic health record. If you see a primary care provider, you can also send messages to your care team and make appointments. If you have questions, please call your primary care clinic.  If you do not have a primary care provider, please call [phone] and they will assist you.        Care EveryWhere ID     This is your Care EveryWhere ID. This could be used by other organizations to access your [place] medical records  [id number]        Your Vitals Were     Pulse Temperature Respirations Height Pulse Oximetry BMI (Body Mass Index)    75 97.7  F (36.5  C) (Oral) 20 5' 7\" (1.702 m) 98% 27.57 kg/m2       Blood Pressure from Last 3 Encounters:   07/25/18 108/72   06/26/18 114/79   06/19/18 111/75    Weight from Last 3 Encounters:   07/25/18 176 lb (79.8 kg)   06/26/18 176 lb (79.8 kg)   06/19/18 180 lb (81.6 kg)              We Performed the Following     Uric acid        Primary Care Provider Office Phone # Fax #    Saravanan Bocanegra [phone]       [address] [place]  [facility] [address]        Equal Access to Services     [facility]: Hadii aad ku hadasho Soomaali, waaxda luqadaha, qaybta kaalmada adeegyada, waxay sesarin hayclaritan yg ferro . So [facility] [phone].    ATENCIÓN: Si habla español, tiene a powers disposición servicios gratuitos de asistencia lingüística. Lorene al [phone].    We comply with applicable federal civil rights laws and [place] laws. We do not discriminate on the basis of race, color, national origin, age, disability, sex, sexual orientation, or gender identity.            Thank you!     Thank you for choosing [facility]  for your care. Our " goal is always to provide you with excellent care. Hearing back from our patients is one way we can continue to improve our services. Please take a few minutes to complete the written survey that you may receive in the mail after your visit with us. Thank you!             Your Updated Medication List - Protect others around you: Learn how to safely use, store and throw away your medicines at www.disposemymeds.org.          This list is accurate as of 7/25/18  8:36 AM.  Always use your most recent med list.                   Brand Name Dispense Instructions for use Diagnosis    aspirin 81 MG tablet     90 tablet    Take 81 mg by mouth as needed        atenolol-chlorthalidone 50-25 MG per tablet    TENORETIC    30 tablet    TAKE ONE TABLET BY MOUTH ONE TIME DAILY    Essential hypertension with goal blood pressure less than 140/90       naproxen 500 MG tablet    NAPROSYN    20 tablet    Take 1 tablet (500 mg) by mouth 2 times daily as needed for moderate pain    Right lower quadrant abdominal tenderness with rebound tenderness       potassium chloride SA 10 MEQ CR tablet    K-DUR/KLOR-CON M    30 tablet    Take 1 tablet (10 mEq) by mouth daily    Essential hypertension

## 2018-07-25 NOTE — TELEPHONE ENCOUNTER
Spoke with patient and relayed his results from provider. RN confirmed where his medication was sent and reviewed dosing instructions with him.    He would like to know if his Uric acid level is considered mildly, moderately or severely high?    He reports that he started taking Potassium pills a few days ago and is wondering if this could've contributed to his elevated Uric acid level?    Claudia Reinoso RN

## 2018-07-25 NOTE — TELEPHONE ENCOUNTER
"RN spoke with patient on the phone regarding his My Chart message. Please see telephone encounter from 7/25/18 encounter named \"pediatrics\" ( named pediatrics in error).   Claudia Reinoso RN    "

## 2018-08-06 ENCOUNTER — OFFICE VISIT (OUTPATIENT)
Dept: FAMILY MEDICINE | Facility: CLINIC | Age: 53
End: 2018-08-06
Payer: COMMERCIAL

## 2018-08-06 VITALS
RESPIRATION RATE: 20 BRPM | HEART RATE: 62 BPM | SYSTOLIC BLOOD PRESSURE: 128 MMHG | WEIGHT: 182.1 LBS | OXYGEN SATURATION: 96 % | TEMPERATURE: 98.1 F | DIASTOLIC BLOOD PRESSURE: 86 MMHG | HEIGHT: 67 IN | BODY MASS INDEX: 28.58 KG/M2

## 2018-08-06 DIAGNOSIS — E87.6 HYPOPOTASSEMIA: Primary | ICD-10-CM

## 2018-08-06 DIAGNOSIS — I10 ESSENTIAL HYPERTENSION: ICD-10-CM

## 2018-08-06 LAB — POTASSIUM SERPL-SCNC: 3.8 MMOL/L (ref 3.4–5.3)

## 2018-08-06 PROCEDURE — 84132 ASSAY OF SERUM POTASSIUM: CPT | Performed by: PHYSICIAN ASSISTANT

## 2018-08-06 PROCEDURE — 99214 OFFICE O/P EST MOD 30 MIN: CPT | Performed by: PHYSICIAN ASSISTANT

## 2018-08-06 PROCEDURE — 36415 COLL VENOUS BLD VENIPUNCTURE: CPT | Performed by: PHYSICIAN ASSISTANT

## 2018-08-06 RX ORDER — POTASSIUM CHLORIDE 750 MG/1
10 TABLET, EXTENDED RELEASE ORAL DAILY
Qty: 30 TABLET | Refills: 1 | Status: CANCELLED | OUTPATIENT
Start: 2018-08-06

## 2018-08-06 RX ORDER — POTASSIUM CHLORIDE 750 MG/1
10 TABLET, EXTENDED RELEASE ORAL DAILY
Qty: 30 TABLET | Refills: 11 | Status: SHIPPED | OUTPATIENT
Start: 2018-08-06

## 2018-08-06 RX ORDER — ATENOLOL AND CHLORTHALIDONE TABLET 50; 25 MG/1; MG/1
1 TABLET ORAL DAILY
Qty: 30 TABLET | Refills: 11 | Status: CANCELLED | OUTPATIENT
Start: 2018-08-06

## 2018-08-06 ASSESSMENT — PAIN SCALES - GENERAL: PAINLEVEL: NO PAIN (0)

## 2018-08-06 NOTE — PROGRESS NOTES
SUBJECTIVE:   Thanh Loaiza is a 52 year old male who presents to clinic today for the following health issues:    Hyperlipidemia Follow-Up      Rate your low fat/cholesterol diet?: fair    Taking statin?  No    Other lipid medications/supplements?:  none    Hypertension Follow-up      Outpatient blood pressures are being checked at home.  Results are 120/80.    Low Salt Diet: no added salt      Amount of exercise or physical activity: knee surgery so just started     Problems taking medications regularly: No    Medication side effects: none    Diet: regular (no restrictions)    Patient supposed to take atenolol/clorthalidone daily, but since he was told in may that his potassium is low, he has been missing doses on an off. Patient was off medication for 5 days before today. Today his BP in am was 180/100, he took 1 table and it lowered to 160/90 he then took 2 nd tablet and BP stabilized.   Patient has been taking potassium daily but missed doses when he was not taking BP medication.   In the past patient was taking Atenolol alone and it was not lowering his BP. Current combination works well when he takes it.         Problem list and histories reviewed & adjusted, as indicated.  Additional history: as documented    Patient Active Problem List   Diagnosis     Erectile dysfunction     CARDIOVASCULAR SCREENING; LDL GOAL LESS THAN 130     Disorder of sacrum     Lumbago     Essential hypertension     DJD (degenerative joint disease), lumbar     Cataract extraction status of right eye     Gastroesophageal reflux disease without esophagitis     H. pylori infection     Complex tear of medial meniscus of right knee as current injury, subsequent encounter     Chondromalacia of right knee     S/P arthroscopy of right knee     Right knee pain     S/P arthroscopic partial medial meniscectomy     External hemorrhoids     S/P right inguinal hernia repair     Benign lipomatous neoplasm of skin and subcutaneous tissue of trunk      Acute gout of right ankle, unspecified cause     Past Surgical History:   Procedure Laterality Date     ARTHROSCOPY KNEE Right 3/16/2018    Procedure: ARTHROSCOPY KNEE;  Right knee arthroscopy, debridement ;  Surgeon: Dale Lester MD;  Location: [facility] OR     COLONOSCOPY  10/14/2011    Procedure:COLONOSCOPY; Colonoscopy, change in stools, diarrhea; Surgeon:ALEIDA DELEON; Location:[facility] OR     COLONOSCOPY  10/14/2011    Procedure:COMBINED COLONOSCOPY, SINGLE BIOPSY/POLYPECTOMY BY BIOPSY; Surgeon:ALEIDA DELEON; Location:[facility]     ORTHOPEDIC SURGERY       TONSILLECTOMY      age 11 years       Social History   Substance Use Topics     Smoking status: Never Smoker     Smokeless tobacco: Never Used     Alcohol use No     Family History   Problem Relation Age of Onset     Hypertension Mother      Diabetes Father      Hypertension Father      Hypertension Brother          Current Outpatient Prescriptions   Medication Sig Dispense Refill     aspirin 81 MG tablet Take 81 mg by mouth as needed  90 tablet 3     atenolol-chlorthalidone (TENORETIC) 50-25 MG per tablet TAKE ONE TABLET BY MOUTH ONE TIME DAILY 30 tablet 11     naproxen (NAPROSYN) 500 MG tablet Take 1 tablet (500 mg) by mouth 2 times daily as needed for moderate pain 20 tablet 0     potassium chloride SA (K-DUR/KLOR-CON M) 10 MEQ CR tablet Take 1 tablet (10 mEq) by mouth daily 30 tablet 11     indomethacin (INDOCIN) 25 MG capsule Take 1 capsule (25 mg) by mouth 3 times daily (with meals) for 10 days 30 capsule 0     No Known Allergies    Reviewed and updated as needed this visit by clinical staff  Tobacco  Allergies  Meds  Problems  Med Hx  Surg Hx  Fam Hx  Soc Hx        Reviewed and updated as needed this visit by Provider  Allergies  Meds  Problems         ROS:  Constitutional, HEENT, cardiovascular, pulmonary, GI, , musculoskeletal, neuro, skin, endocrine and psych systems are negative, except as otherwise noted.    OBJECTIVE:     /86 (BP  "Location: Right arm, Patient Position: Sitting, Cuff Size: Adult Large)  Pulse 62  Temp 98.1  F (36.7  C) (Oral)  Resp 20  Ht 1.702 m (5' 7\")  Wt 82.6 kg (182 lb 1.6 oz)  SpO2 96%  BMI 28.52 kg/m2  Body mass index is 28.52 kg/(m^2).  GENERAL: healthy, alert and no distress  NECK: no adenopathy, no asymmetry, masses, or scars and thyroid normal to palpation  RESP: lungs clear to auscultation - no rales, rhonchi or wheezes  CV: regular rate and rhythm, normal S1 S2, no S3 or S4, no murmur, click or rub, no peripheral edema and peripheral pulses strong  ABDOMEN: soft, nontender, no hepatosplenomegaly, no masses and bowel sounds normal  MS: no gross musculoskeletal defects noted, no edema    Diagnostic Test Results:  K+ is pending    ASSESSMENT/PLAN:       ICD-10-CM    1. Hypopotassemia E87.6 Potassium   2. Essential hypertension I10 potassium chloride SA (K-DUR/KLOR-CON M) 10 MEQ CR tablet     Potassium     Discussed importance of taking BP medication daily  Continue to take 1 tablet daily of Atenolol/chlorthalidone  Continue k+ daily   Record BP log, if persistently higher than 140/90 please follow up or call to have dose increased.       Aide Oates PA-C  [facility]  "

## 2018-08-06 NOTE — MR AVS SNAPSHOT
After Visit Summary   8/6/2018    Thanh Loaiza    MRN: [id number]           Patient Information     Date Of Birth          1965        Visit Information        Provider Department      8/6/2018 3:00 PM Aide Oates PA-C [facility]        Today's Diagnoses     Hypopotassemia    -  1    Essential hypertension           Follow-ups after your visit        Your next 10 appointments already scheduled     Aug 09, 2018  8:00 AM CDT   CT CALCIUM SCREENING with [facility]1, [facility] NUR[facility]   [facility]    [address] [address] [place] [id number] [phone]           It is best to avoid caffeine on the day of your test.  Be sure to tell your doctor:   If there s any chance you are pregnant.   If you have any special needs.  Please wear loose clothing, such as a sweat suit or jogging clothes. Avoid snaps, zippers and other metal. We may ask you to undress and put on a hospital gown.  If you have any questions, please call the Imaging Department where you will have your exam.              Who to contact     If you have questions or need follow up information about today's clinic visit or your schedule please contact [facility] directly at [phone].  Normal or non-critical lab and imaging results will be communicated to you by MyChart, letter or phone within 4 business days after the clinic has received the results. If you do not hear from us within 7 days, please contact the clinic through [employer] or phone. If you have a critical or abnormal lab result, we will notify you by phone as soon as possible.  Submit refill requests through [employer] or call your pharmacy and they will forward the refill request to us. Please allow 3 business days for your refill to be completed.          Additional Information About Your Visit        [employer] Information     [employer] gives you secure access to your  "electronic health record. If you see a primary care provider, you can also send messages to your care team and make appointments. If you have questions, please call your primary care clinic.  If you do not have a primary care provider, please call [phone] and they will assist you.        Care EveryWhere ID     This is your Care EveryWhere ID. This could be used by other organizations to access your [facility] medical records  [id number]        Your Vitals Were     Pulse Temperature Respirations Height Pulse Oximetry BMI (Body Mass Index)    62 98.1  F (36.7  C) (Oral) 20 1.702 m (5' 7\") 96% 28.52 kg/m2       Blood Pressure from Last 3 Encounters:   08/06/18 128/86   07/25/18 108/72   06/26/18 114/79    Weight from Last 3 Encounters:   08/06/18 82.6 kg (182 lb 1.6 oz)   07/25/18 79.8 kg (176 lb)   06/26/18 79.8 kg (176 lb)              We Performed the Following     Potassium          Where to get your medicines      These medications were sent to [facility] - [place], [place] - [address]  [address], [facility] [address]     Phone:  [phone]     potassium chloride SA 10 MEQ CR tablet          Primary Care Provider Office Phone # Fax #    Saravanan Bocanegra [phone]       [address]  [facility] [address]        Equal Access to Services     KHUSHBU FORD AH: Hadii edi ku hadasho Soomaali, waaxda luqadaha, qaybta kaalmada adeegyada, corwin rolon. So [facility] [phone].    ATENCIÓN: Si habla español, tiene a powers disposición servicios gratuitos de asistencia lingüística. Llame al [phone].    We comply with applicable federal civil rights laws and [place] laws. We do not discriminate on the basis of race, color, national origin, age, disability, sex, sexual orientation, or gender identity.            Thank you!     Thank you for choosing [facility]  for your care. Our goal is always to provide you with " excellent care. Hearing back from our patients is one way we can continue to improve our services. Please take a few minutes to complete the written survey that you may receive in the mail after your visit with us. Thank you!             Your Updated Medication List - Protect others around you: Learn how to safely use, store and throw away your medicines at www.disposemymeds.org.          This list is accurate as of 8/6/18  4:41 PM.  Always use your most recent med list.                   Brand Name Dispense Instructions for use Diagnosis    aspirin 81 MG tablet     90 tablet    Take 81 mg by mouth as needed        atenolol-chlorthalidone 50-25 MG per tablet    TENORETIC    30 tablet    TAKE ONE TABLET BY MOUTH ONE TIME DAILY    Essential hypertension with goal blood pressure less than 140/90       indomethacin 25 MG capsule    INDOCIN    30 capsule    Take 1 capsule (25 mg) by mouth 3 times daily (with meals) for 10 days    Acute gout of right ankle, unspecified cause       naproxen 500 MG tablet    NAPROSYN    20 tablet    Take 1 tablet (500 mg) by mouth 2 times daily as needed for moderate pain    Right lower quadrant abdominal tenderness with rebound tenderness       potassium chloride SA 10 MEQ CR tablet    K-DUR/KLOR-CON M    30 tablet    Take 1 tablet (10 mEq) by mouth daily    Essential hypertension

## 2018-08-09 ENCOUNTER — RADIANT APPOINTMENT (OUTPATIENT)
Dept: CT IMAGING | Facility: CLINIC | Age: 53
End: 2018-08-09
Payer: COMMERCIAL

## 2018-08-09 DIAGNOSIS — R10.13 ABDOMINAL PAIN, EPIGASTRIC: ICD-10-CM

## 2018-08-09 PROCEDURE — 75571 CT HRT W/O DYE W/CA TEST: CPT | Performed by: INTERNAL MEDICINE

## 2018-08-11 ENCOUNTER — MYC MEDICAL ADVICE (OUTPATIENT)
Dept: FAMILY MEDICINE | Facility: CLINIC | Age: 53
End: 2018-08-11

## 2018-08-11 ENCOUNTER — NURSE TRIAGE (OUTPATIENT)
Dept: NURSING | Facility: CLINIC | Age: 53
End: 2018-08-11

## 2018-08-11 NOTE — TELEPHONE ENCOUNTER
"  Additional Information    Negative: Severe difficulty breathing (e.g., struggling for each breath, speaks in single words)    Negative: Difficult to awaken or acting confused (e.g., disoriented, slurred speech)    Negative: Shock suspected (e.g., cold/pale/clammy skin, too weak to stand, low BP, rapid pulse)    Negative: [1] Chest pain lasts > 5 minutes AND [2] history of heart disease  (i.e., heart attack, bypass surgery, angina, angioplasty, CHF; not just a heart murmur)    Negative: [1] Chest pain lasts > 5 minutes AND [2] described as crushing, pressure-like, or heavy    Negative: [1] Chest pain lasts > 5 minutes AND [2] age > 50    Negative: [1] Chest pain lasts > 5 minutes AND [2] age > 30 AND [3] at least one cardiac risk factor (i.e., hypertension, diabetes, obesity, smoker or strong family history of heart disease)    Negative: [1] Chest pain lasts > 5 minutes AND [2] not relieved with nitroglycerin    Negative: Passed out (i.e., lost consciousness, collapsed and was not responding)    Negative: Heart beating < 50 beats per minute OR > 140 beats per minute    Negative: Visible sweat on face or sweat dripping down face    Negative: Sounds like a life-threatening emergency to the triager    Negative: SEVERE chest pain    Negative: [1] Intermittent  chest pain or \"angina\" AND [2] increasing in severity or frequency  (Exception: pains lasting a few seconds)    Negative: Pain also present in shoulder(s) or arm(s) or jaw  (Exception: pain is clearly made worse by movement)    Negative: Difficulty breathing    Negative: Dizziness or lightheadedness    Negative: Coughing up blood    Negative: Cocaine use within last 3 days    Negative: History of prior \"blood clot\" in leg or lungs (i.e., deep vein thrombosis, pulmonary embolism)    Negative: Recent illness requiring prolonged bedrest (i.e., immobilization)    Negative: Hip or leg fracture in past 2 months (e.g., had cast on leg or ankle)    Negative: Major " "surgery in the past month    Negative: Recent long-distance travel with prolonged time in car, bus, plane, or train (i.e., within past 2 weeks; 6 or  more hours duration)    Negative: Chest pain lasts > 5 minutes (Exceptions: chest pain occurring > 3 days ago and now asymptomatic; same as previously diagnosed heartburn and has accompanying sour taste in mouth)    Negative: Taking a deep breath makes pain worse    Negative: Patient sounds very sick or weak to the triager    Negative: [1] Chest pain lasts > 5 minutes AND [2] occurred > 3 days ago (72 hours) AND [3] NO chest pain or cardiac symptoms now    Negative: [1] Chest pain lasting <= 5 minutes AND [2] NO chest pain or cardiac symptoms now(Exceptions: pains lasting a few seconds)    Negative: Fever > 100.5 F (38.1 C)    Negative: Rash in same area as pain (may be described as \"small blisters\")    Negative: [1] Patient claims chest pain is same as previously diagnosed \"heartburn\" AND [2] describes burning in chest AND [3] accompanying sour taste in mouth    Negative: [1] Chest pain lasting <= 5 minutes AND [2] has not taken prescribed nitroglycerin    Negative: [1] Chest pain lasting <= 5 minutes AND [2] completely relieved by nitroglycerin    Negative: [1] Intermittent chest pain from \"angina\" AND [2] NO increase in severity or frequency    Negative: [1] Chest pain(s) lasting a few seconds AND [2] persists > 3 days    Negative: Chest pain(s) lasting a few seconds from coughing AND [2] persists > 3 days    Chest pain(s) lasting a few seconds from coughing (all triage questions negative)    Protocols used: CHEST PAIN-ADULT-    "

## 2018-08-11 NOTE — TELEPHONE ENCOUNTER
Thanh is having heartburn and is burping a lot.  Thanh is having a burning sensation on left side.  Thanh is calling for radiology test results and has questions on an angiogram.    FNA advised patient to phone back on Monday when clinic is open for CT results and patient agreed.

## 2018-08-13 ENCOUNTER — MYC MEDICAL ADVICE (OUTPATIENT)
Dept: FAMILY MEDICINE | Facility: CLINIC | Age: 53
End: 2018-08-13

## 2018-08-14 ENCOUNTER — RADIANT APPOINTMENT (OUTPATIENT)
Dept: MRI IMAGING | Facility: CLINIC | Age: 53
End: 2018-08-14
Attending: PHYSICIAN ASSISTANT
Payer: COMMERCIAL

## 2018-08-14 DIAGNOSIS — M25.561 RIGHT KNEE PAIN, UNSPECIFIED CHRONICITY: ICD-10-CM

## 2018-08-14 PROCEDURE — 73721 MRI JNT OF LWR EXTRE W/O DYE: CPT | Mod: RT | Performed by: RADIOLOGY

## 2018-08-17 ENCOUNTER — MYC MEDICAL ADVICE (OUTPATIENT)
Dept: FAMILY MEDICINE | Facility: CLINIC | Age: 53
End: 2018-08-17

## 2018-08-17 DIAGNOSIS — M25.579 ACUTE ANKLE PAIN, UNSPECIFIED LATERALITY: Primary | ICD-10-CM

## 2018-08-18 ENCOUNTER — OFFICE VISIT (OUTPATIENT)
Dept: URGENT CARE | Facility: URGENT CARE | Age: 53
End: 2018-08-18
Payer: COMMERCIAL

## 2018-08-18 ENCOUNTER — RADIANT APPOINTMENT (OUTPATIENT)
Dept: GENERAL RADIOLOGY | Facility: CLINIC | Age: 53
End: 2018-08-18
Attending: PHYSICIAN ASSISTANT
Payer: COMMERCIAL

## 2018-08-18 VITALS
HEART RATE: 49 BPM | RESPIRATION RATE: 14 BRPM | BODY MASS INDEX: 27.72 KG/M2 | OXYGEN SATURATION: 98 % | SYSTOLIC BLOOD PRESSURE: 102 MMHG | TEMPERATURE: 97.8 F | WEIGHT: 177 LBS | DIASTOLIC BLOOD PRESSURE: 68 MMHG

## 2018-08-18 DIAGNOSIS — M25.571 ACUTE RIGHT ANKLE PAIN: ICD-10-CM

## 2018-08-18 DIAGNOSIS — M25.579 ACUTE ANKLE PAIN, UNSPECIFIED LATERALITY: ICD-10-CM

## 2018-08-18 DIAGNOSIS — M25.571 ACUTE RIGHT ANKLE PAIN: Primary | ICD-10-CM

## 2018-08-18 DIAGNOSIS — M10.9 ACUTE GOUTY ARTHRITIS: ICD-10-CM

## 2018-08-18 PROCEDURE — 36415 COLL VENOUS BLD VENIPUNCTURE: CPT | Performed by: NURSE PRACTITIONER

## 2018-08-18 PROCEDURE — 73610 X-RAY EXAM OF ANKLE: CPT | Mod: RT

## 2018-08-18 PROCEDURE — 84550 ASSAY OF BLOOD/URIC ACID: CPT | Performed by: NURSE PRACTITIONER

## 2018-08-18 PROCEDURE — 99213 OFFICE O/P EST LOW 20 MIN: CPT | Performed by: PHYSICIAN ASSISTANT

## 2018-08-18 RX ORDER — PREDNISONE 20 MG/1
TABLET ORAL
Qty: 20 TABLET | Refills: 0 | Status: SHIPPED | OUTPATIENT
Start: 2018-08-18 | End: 2018-12-27

## 2018-08-18 NOTE — MR AVS SNAPSHOT
After Visit Summary   8/18/2018    Thanh Loaiza    MRN: [id number]           Patient Information     Date Of Birth          1965        Visit Information        Provider Department      8/18/2018 11:35 AM Tamiko Granados PA-C [facility]        Today's Diagnoses     Acute right ankle pain    -  1       Follow-ups after your visit        Your next 10 appointments already scheduled     Aug 24, 2018  8:00 AM CDT   Marcello Walden with Saravanan Bocanegra MD   [facility] ([facility])    [address] [place] [id number] [phone]              Who to contact     If you have questions or need follow up information about today's clinic visit or your schedule please contact [facility] directly at [phone].  Normal or non-critical lab and imaging results will be communicated to you by MyChart, letter or phone within 4 business days after the clinic has received the results. If you do not hear from us within 7 days, please contact the clinic through MyChart or phone. If you have a critical or abnormal lab result, we will notify you by phone as soon as possible.  Submit refill requests through [employer] or call your pharmacy and they will forward the refill request to us. Please allow 3 business days for your refill to be completed.          Additional Information About Your Visit        MyChart Information     [employer] gives you secure access to your electronic health record. If you see a primary care provider, you can also send messages to your care team and make appointments. If you have questions, please call your primary care clinic.  If you do not have a primary care provider, please call [phone] and they will assist you.        Care EveryWhere ID     This is your Care EveryWhere ID. This could be used by other organizations to access your [facility] medical records  [id number]        Your Vitals Were      Pulse Temperature Respirations Pulse Oximetry BMI (Body Mass Index)       49 97.8  F (36.6  C) (Oral) 14 98% 27.72 kg/m2        Blood Pressure from Last 3 Encounters:   08/18/18 102/68   08/06/18 128/86   07/25/18 108/72    Weight from Last 3 Encounters:   08/18/18 177 lb (80.3 kg)   08/06/18 182 lb 1.6 oz (82.6 kg)   07/25/18 176 lb (79.8 kg)                 Today's Medication Changes          These changes are accurate as of 8/18/18 12:35 PM.  If you have any questions, ask your nurse or doctor.               Start taking these medicines.        Dose/Directions    predniSONE 20 MG tablet   Commonly known as:  DELTASONE   Used for:  Acute right ankle pain   Started by:  Tamiko Granados PA-C        Take 3 tabs (60 mg) by mouth daily x 3 days, 2 tabs (40 mg) daily x 3 days, 1 tab (20 mg) daily x 3 days, then 1/2 tab (10 mg) x 3 days.   Quantity:  20 tablet   Refills:  0            Where to get your medicines      These medications were sent to [facility] [place] - [address]  [address], [facility] [address]     Phone:  [phone]     predniSONE 20 MG tablet                Primary Care Provider Office Phone # Fax #    Saravanan Bocanegra -[phone]       [address]  [facility] [address]        Equal Access to Services     [facility] AH: Hadii aad ku hadasho Soomaali, waaxda luqadaha, qaybta kaalmada adeegyada, waxay stephan ferro . So [facility] [phone].    ATENCIÓN: Si habla español, tiene a powers disposición servicios gratuitos de asistencia lingüística. Llame al [phone].    We comply with applicable federal civil rights laws and [place] laws. We do not discriminate on the basis of race, color, national origin, age, disability, sex, sexual orientation, or gender identity.            Thank you!     Thank you for choosing [facility]  for your care. Our goal is always to provide you with excellent care.  Hearing back from our patients is one way we can continue to improve our services. Please take a few minutes to complete the written survey that you may receive in the mail after your visit with us. Thank you!             Your Updated Medication List - Protect others around you: Learn how to safely use, store and throw away your medicines at www.disposemymeds.org.          This list is accurate as of 8/18/18 12:35 PM.  Always use your most recent med list.                   Brand Name Dispense Instructions for use Diagnosis    aspirin 81 MG tablet     90 tablet    Take 81 mg by mouth as needed        atenolol-chlorthalidone 50-25 MG per tablet    TENORETIC    30 tablet    TAKE ONE TABLET BY MOUTH ONE TIME DAILY    Essential hypertension with goal blood pressure less than 140/90       indomethacin 25 MG capsule    INDOCIN    30 capsule    Take 1 capsule (25 mg) by mouth 3 times daily (with meals) for 10 days    Acute gout of right ankle, unspecified cause       naproxen 500 MG tablet    NAPROSYN    20 tablet    Take 1 tablet (500 mg) by mouth 2 times daily as needed for moderate pain    Right lower quadrant abdominal tenderness with rebound tenderness       potassium chloride SA 10 MEQ CR tablet    K-DUR/KLOR-CON M    30 tablet    Take 1 tablet (10 mEq) by mouth daily    Essential hypertension       predniSONE 20 MG tablet    DELTASONE    20 tablet    Take 3 tabs (60 mg) by mouth daily x 3 days, 2 tabs (40 mg) daily x 3 days, 1 tab (20 mg) daily x 3 days, then 1/2 tab (10 mg) x 3 days.    Acute right ankle pain

## 2018-08-18 NOTE — PROGRESS NOTES
S: 51 yo male presents for right ankle pain flareup for a few days.  Recently he was seen in July for right ankle pain and was was diagnosed with gout.  His uric acid test was 9.4 on 7/25/2018.  He did have a repeat uric acid test today.  He was placed on indomethacin which resolved the pain but caused significant heartburn.  He denies any injury.  He has been using turmeric treatment which does provide some mild relief.        Past Medical History:   Diagnosis Date     DDD (degenerative disc disease), lumbar      DJD (degenerative joint disease), lumbar      Hypertension goal BP (blood pressure) < 140/90 12/14/2010     History   Smoking Status     Never Smoker   Smokeless Tobacco     Never Used       ROS:  GEN no fevers  SKIN no erythema  Musculoskeletal:  See HPI.      OBJECTIVE:  Blood pressure 102/68, pulse (!) 49, temperature 97.8  F (36.6  C), temperature source Oral, resp. rate 14, weight 177 lb (80.3 kg), SpO2 98 %. Prior vitals reviewed- trend  H/o lower pulsei. No dizziness today  Patient is alert and NAD.  EYES: conjunctiva clear  Ankle Exam (right):  Inspection:swelling around the lateral malleolus.  Skin is yellow looking from the tumeric.  Palpation:tender over lateral malleolus  Cap refill intact.    Good doralis pedis.  Neurovascularly Intact Distally.     XR no fx/fb - I see no acute abnormalities. No significant arthritic changes.    ASSESSMENT:    ICD-10-CM    1. Acute right ankle pain M25.571 XR Ankle Right G/E 3 Views     predniSONE (DELTASONE) 20 MG tablet   2. Acute gouty arthritis M10.9            PLAN:  He does have Zantac and Prilosec Prilosec prescriptions at home.  Recommended he take this during treatment with prednisone.  Follow-up with primary if pain does not resolve or returns.  Awaiting uric acid test results from today.  Tamiko Granados PA-C

## 2018-08-20 LAB — URATE SERPL-MCNC: 9.2 MG/DL (ref 3.5–7.2)

## 2018-08-21 ENCOUNTER — TRANSFERRED RECORDS (OUTPATIENT)
Dept: HEALTH INFORMATION MANAGEMENT | Facility: CLINIC | Age: 53
End: 2018-08-21

## 2018-08-31 ENCOUNTER — MYC MEDICAL ADVICE (OUTPATIENT)
Dept: FAMILY MEDICINE | Facility: CLINIC | Age: 53
End: 2018-08-31

## 2018-11-07 ENCOUNTER — MYC MEDICAL ADVICE (OUTPATIENT)
Dept: FAMILY MEDICINE | Facility: CLINIC | Age: 53
End: 2018-11-07

## 2018-11-07 DIAGNOSIS — R19.7 DIARRHEA, UNSPECIFIED TYPE: Primary | ICD-10-CM

## 2018-12-27 ENCOUNTER — OFFICE VISIT (OUTPATIENT)
Dept: FAMILY MEDICINE | Facility: CLINIC | Age: 53
End: 2018-12-27
Payer: COMMERCIAL

## 2018-12-27 VITALS
DIASTOLIC BLOOD PRESSURE: 76 MMHG | WEIGHT: 173.4 LBS | TEMPERATURE: 98.1 F | HEART RATE: 64 BPM | HEIGHT: 67 IN | SYSTOLIC BLOOD PRESSURE: 119 MMHG | OXYGEN SATURATION: 94 % | BODY MASS INDEX: 27.21 KG/M2

## 2018-12-27 DIAGNOSIS — L29.0 RECTAL ITCHING: ICD-10-CM

## 2018-12-27 DIAGNOSIS — I10 ESSENTIAL HYPERTENSION: ICD-10-CM

## 2018-12-27 DIAGNOSIS — K21.9 GASTROESOPHAGEAL REFLUX DISEASE WITHOUT ESOPHAGITIS: Primary | ICD-10-CM

## 2018-12-27 PROCEDURE — 99214 OFFICE O/P EST MOD 30 MIN: CPT | Performed by: NURSE PRACTITIONER

## 2018-12-27 RX ORDER — ESOMEPRAZOLE MAGNESIUM 40 MG/1
40 CAPSULE, DELAYED RELEASE ORAL
Qty: 90 CAPSULE | Refills: 1 | Status: SHIPPED | OUTPATIENT
Start: 2018-12-27 | End: 2019-12-27

## 2018-12-27 RX ORDER — LISINOPRIL 10 MG/1
TABLET ORAL
Refills: 3 | COMMUNITY
Start: 2018-12-18

## 2018-12-27 ASSESSMENT — MIFFLIN-ST. JEOR: SCORE: 1580.17

## 2018-12-27 NOTE — PATIENT INSTRUCTIONS
At [facility], we strive to deliver an exceptional experience to you, every time we see you.  If you receive a survey in the mail, please send us back your thoughts. We really do value your feedback.    Your care team:                            Family Medicine Internal Medicine   MD Alexy Giraldo MD Shantel Branch-Fleming, MD Katya Georgiev PA-C Megan Hill, APRN CNP    Saravanan Bocanegra, MD Pediatrics   Xavier Holland, GOPI Goddard, MD Maria R Haque APRN CNP   MD Martha Garcia MD Deborah Mielke, MD Tisha Odom, APRN [facility]      Clinic hours: Monday - Thursday 7 am-7 pm; Fridays 7 am-5 pm.   Urgent care: Monday - Friday 11 am-9 pm; Saturday and Sunday 9 am-5 pm.  Pharmacy : Monday -Thursday 8 am-8 pm; Friday 8 am-6 pm; Saturday and Sunday 9 am-5 pm.     Clinic: [phone]   Pharmacy: [phone]        Patient Education     GERD (Adult)    The esophagus is a tube that carries food from the mouth to the stomach. A valve (the LES, lower esophageal sphincter) at the lower end of the esophagus prevents stomach acid from flowing upward. When this valve doesn't work properly, stomach contents may repeatedly flow back up (reflux) into the esophagus. This is called gastroesophageal reflux disease (GERD). GERD can irritate the esophagus. It can cause problems with pain, swallowing or breathing. In severe cases, GERD can cause recurrent pneumonia (from aspiration or breathing in particles) or other serious problems.  Symptoms of reflux include burning, pressure or sharp pain in the upper abdomen or mid to lower chest. The pain can spread to the neck, back, or shoulder. There may be belching, an acid taste in the back of the throat, chronic cough, or sore throat, or hoarseness. GERD symptoms often occur during the day after a big meal. They can also occur at night when lying down.   Home care  Lifestyle changes can help reduce symptoms. If  "needed, your healthcare provider may prescribe medicines. Symptoms often improve with treatment, but if treatment is stopped, the symptoms often return after a few months. So most persons with GERD will need to continue treatment or get treatment on and off.  Lifestyle changes    Limit or avoid fatty, fried, and spicy foods, as well as coffee, chocolate, mint, and foods with high acid content such as tomatoes and citrus fruit and juices (orange, grapefruit, lemon).    Don t eat large meals, especially at night. Frequent, smaller meals are best. Don't lie down right after eating. And don t eat anything 3 hours before going to bed.    Don't drink alcohol or smoke. As much as possible, stay away from second hand smoke.    If you are overweight, losing weight will reduce symptoms.     Don't wear tight clothing around your stomach area.    If your symptoms occur during sleep, use a foam wedge to elevate your upper body (not just your head.) Or, place 4\" blocks under the head of your bed. Or use 2 bed risers under your bedframe.  Medicines  If needed, medicines can help relieve the symptoms of GERD and prevent damage to the esophagus. Discuss a medicine plan with your healthcare provider. This may include one or more of the following medicines:    Antacids to help neutralize the normal acids in your stomach.    Acid blockers (Histamine or H2 blockers) to decrease acid production.    Acid inhibitors (proton pump inhibitors PPIs) to decrease acid production in a different way than the blockers. They may work better, but can take a little longer to take effect.  Take an antacid 30 to 60 minutes after eating and at bedtime, but not at the same time as an acid blocker.  Try not to take medicines such as ibuprofen and aspirin. If you are taking aspirin for your heart or other medical reasons, talk to your healthcare provider about stopping it.  Follow-up care  Follow up with your healthcare provider or as advised by our " staff.  When to seek medical advice  Call your healthcare provider if any of the following occur:    Stomach pain gets worse or moves to the lower right abdomen (appendix area)    Chest pain appears or gets worse, or spreads to the back, neck, shoulder, or arm    An over-the-counter trial of medicine doesn't relieve your symptoms    Weight loss that can't be explained    Trouble or pain swallowing    Frequent vomiting (can t keep down liquids)    Blood in the stool or vomit (red or black in color)    Feeling weak or dizzy    Fever of 100.4 F (38 C) or higher, or as directed by your healthcare provider  Date Last Reviewed: 3/1/2018    6944-1955 [employer]. [address] [place] [address], [place] [address]. All rights reserved. This information is not intended as a substitute for professional medical care. Always follow your healthcare professional's instructions.           Patient Education     Esomeprazole capsules  Brand Names: Nexium, Nexium 24HR  What is this medicine?  ESOMEPRAZOLE (es oh ME pray zol) prevents the production of acid in the stomach. It is used to treat gastroesophageal reflux disease (GERD), ulcers, certain bacteria in the stomach, and inflammation of the esophagus. It can also be used to prevent ulcers in patients taking medicines called NSAIDs.  You can also buy this medicine without a prescription to treat the symptoms of heartburn. The non-prescription product is not for long-term use, unless otherwise directed by your doctor or health care professional.  How should I use this medicine?  Take this medicine by mouth. Swallow the capsules whole with a drink of water. Follow the directions on the prescription or product label. Do not crush, break or chew. The capsules can be opened and the contents sprinkled on applesauce. Do not crush the contents into the food. This medicine works best if taken on an empty stomach at least one hour before a meal. Take your medicine at regular intervals.  Do not take your medicine more often than directed.  Talk to your pediatrician regarding the use of this medicine in children. Special care may be needed.  What side effects may I notice from receiving this medicine?  Side effects that you should report to your doctor or health care professional as soon as possible:    allergic reactions like skin rash, itching or hives, swelling of the face, lips, or tongue    bone, muscle or joint pain    breathing problems    chest pain or chest tightness    dark yellow or brown urine    fast, irregular heartbeat    feeling faint or lightheaded    fever or sore throat    muscle spasms    rash on cheeks or arms that gets worse in the sun    stomach polyps    tremors    unusual bleeding or bruising    unusually weak or tired    upset stomach    yellowing of the eyes or skin  Side effects that usually do not require medical attention (report to your doctor or health care professional if they continue or are bothersome):    constipation    diarrhea    dry mouth    headache    nausea    stomach pain or gas    vomiting  What may interact with this medicine?  Do not take this medicine with any of the following medications:    atazanavir  This medicine may also interact with the following medications:    ampicillin    antiviral medicines for HIV or AIDS    certain medicines for fungal infections like ketoconazole and itraconazole    cilostazol    clopidogrel    diazepam    digoxin    erlotinib    diuretics    iron salts    methotrexate    Audrey's Wort    tacrolimus    rifampin    warfarin  What if I miss a dose?  If you miss a dose, take it as soon as you can. If it is almost time for your next dose, take only that dose. Do not take double or extra doses.  Where should I keep my medicine?  Keep out of the reach of children.  Store at room temperature between 15 and 30 degrees C (59 and 86 degrees F). Protect from light and moisture. Throw away any unused medicine after the expiration  date.  What should I tell my health care provider before I take this medicine?  They need to know if you have any of these conditions:    bloody or black, tarry stools    chest pain    have had heartburn for over 3 months    have heartburn with dizziness, lightheadedness, or sweating    liver disease    low levels of magnesium in the blood    lupus    nausea, vomiting    stomach pain    trouble swallowing    unexplained weight loss    vomiting with blood    wheezing    an unusual or allergic reaction to esomeprazole, other medicines, foods, dyes, or preservatives    pregnant or trying to get pregnant    breast-feeding  What should I watch for while using this medicine?  If you are taking this medicine without a prescription, it may take 1 to 4 days for it to fully relieve your heartburn.   If you are using this medicine with a prescription from your healthcare professional for a more serious condition, it can take several days before your condition gets better. Check with your doctor or health care professional if your condition does not start to get better, or if it gets worse.  If you take this medicine for long periods of time, you may need blood work done.  NOTE:This sheet is a summary. It may not cover all possible information. If you have questions about this medicine, talk to your doctor, pharmacist, or health care provider. Copyright  2018 [employer]

## 2018-12-27 NOTE — PROGRESS NOTES
SUBJECTIVE:   Thanh Loaiza is a 53 year old male who presents to clinic today for the following health issues:      GERD/Heartburn      Duration: one year but in the last few weeks it has gotten worse in the last 2 weeks    Description (location/character/radiation):     Intensity:  Moderate to severe    Accompanying signs and symptoms: Hemorrhoid for the last couple of months- has itching, no BRBPR  food getting stuck: no   nausea/vomiting/blood: no   abdominal pain: no   black/tarry or bloody stools: no :    History (similar episodes/previous evaluation): yes    Precipitating or alleviating factors:  worse with no particular food or drink.  current NSAID/Aspirin use: no     Therapies tried and outcome: Zantac (Ranitidine) and apple cider vinegar and prilosec    Patient also states he was recently diagnosed with gout and had low Potassium in May  and requests labs to check his Potassium today. He is no longer takong Indocin. H3 was treated fir + H PYyori in March, had negative screen in April after being treated for the H pylori.      Problem list and histories reviewed & adjusted, as indicated.  Additional history: as documented    Patient Active Problem List   Diagnosis     Erectile dysfunction     CARDIOVASCULAR SCREENING; LDL GOAL LESS THAN 130     Disorder of sacrum     Lumbago     Essential hypertension     DJD (degenerative joint disease), lumbar     Cataract extraction status of right eye     Gastroesophageal reflux disease without esophagitis     H. pylori infection     Complex tear of medial meniscus of right knee as current injury, subsequent encounter     Chondromalacia of right knee     S/P arthroscopy of right knee     Right knee pain     S/P arthroscopic partial medial meniscectomy     External hemorrhoids     S/P right inguinal hernia repair     Benign lipomatous neoplasm of skin and subcutaneous tissue of trunk     Acute gout of right ankle, unspecified cause     Past Surgical History:  "  Procedure Laterality Date     ARTHROSCOPY KNEE Right 3/16/2018    Procedure: ARTHROSCOPY KNEE;  Right knee arthroscopy, debridement ;  Surgeon: Dale Lester MD;  Location: [facility] OR     COLONOSCOPY  10/14/2011    Procedure:COLONOSCOPY; Colonoscopy, change in stools, diarrhea; Surgeon:ALEIDA DELEON; Location:[facility] OR     COLONOSCOPY  10/14/2011    Procedure:COMBINED COLONOSCOPY, SINGLE BIOPSY/POLYPECTOMY BY BIOPSY; Surgeon:ALEIDA DELEON; Location:[facility] OR     ORTHOPEDIC SURGERY       TONSILLECTOMY      age 11 years       Social History     Tobacco Use     Smoking status: Never Smoker     Smokeless tobacco: Never Used   Substance Use Topics     Alcohol use: No     Family History   Problem Relation Age of Onset     Hypertension Mother      Diabetes Father      Hypertension Father      Hypertension Brother          Current Outpatient Medications   Medication Sig Dispense Refill     lisinopril (PRINIVIL/ZESTRIL) 10 MG tablet   3     aspirin 81 MG tablet Take 81 mg by mouth as needed  90 tablet 3     potassium chloride SA (K-DUR/KLOR-CON M) 10 MEQ CR tablet Take 1 tablet (10 mEq) by mouth daily (Patient not taking: Reported on 12/27/2018) 30 tablet 11     BP Readings from Last 3 Encounters:   12/27/18 119/76   08/18/18 102/68   08/06/18 128/86    Wt Readings from Last 3 Encounters:   12/27/18 78.7 kg (173 lb 6.4 oz)   08/18/18 80.3 kg (177 lb)   08/06/18 82.6 kg (182 lb 1.6 oz)                    Reviewed and updated as needed this visit by clinical staff  Tobacco  Allergies  Meds  Med Hx  Surg Hx  Fam Hx  Soc Hx      Reviewed and updated as needed this visit by Provider         ROS:  Constitutional, HEENT, cardiovascular, pulmonary, gi and gu systems are negative, except as otherwise noted.    OBJECTIVE:     /76   Pulse 64   Temp 98.1  F (36.7  C) (Oral)   Ht 1.702 m (5' 7\")   Wt 78.7 kg (173 lb 6.4 oz)   SpO2 94%   BMI 27.16 kg/m    Body mass index is 27.16 kg/m .  GENERAL: healthy, alert and no " "distress  EYES: Eyes grossly normal to inspection, PERRL and conjunctivae and sclerae normal  HENT: ear canals and TM's normal, nose and mouth without ulcers or lesions  NECK: no adenopathy, no asymmetry, masses, or scars and thyroid normal to palpation  RESP: lungs clear to auscultation - no rales, rhonchi or wheezes  CV: regular rate and rhythm, normal S1 S2, no S3 or S4, no murmur, click or rub, no peripheral edema and peripheral pulses strong  ABDOMEN: soft, nontender, no hepatosplenomegaly, no masses and bowel sounds normal  MS: no gross musculoskeletal defects noted, no edema  SKIN: no suspicious lesions or rashes  NEURO: Normal strength and tone, mentation intact and speech normal  Rectal- patient declined rectale exam today.  BACK: no CVA tenderness, no paralumbar tenderness  PSYCH: mentation appears normal, affect normal/bright  LYMPH: normal ant/post cervical, supraclavicular nodes    Diagnostic Test Results:  none     ASSESSMENT/PLAN:         BMI:   Estimated body mass index is 27.16 kg/m  as calculated from the following:    Height as of this encounter: 1.702 m (5' 7\").    Weight as of this encounter: 78.7 kg (173 lb 6.4 oz).   Weight management plan: Discussed healthy diet and exercise guidelines      1. Gastroesophageal reflux disease without esophagitis  Getting him back on Nexuim, return to clinic if not improved, new, or worsening symptoms and would refer to GI for possible endoscopy.  He had colonoscopy 11/15/18= 1 benign polyp.     - esomeprazole (NEXIUM) 40 MG DR capsule; Take 1 capsule (40 mg) by mouth every morning (before breakfast) Take 30-60 minutes before eating.  Dispense: 90 capsule; Refill: 1    2. Essential hypertension  Well controlled, continue present management.    3  Rectal itching   Advised frequent fluids, increased fiber in the diet to prevent constipation, OK to use Preparation H right other OTC hemorrhoidal preparation for itching, return to clinic if not improved, new, or " worsening symptoms.     Work on weight loss  Regular exercise  See Patient Instructions    [facility]

## 2018-12-28 ENCOUNTER — TRANSFERRED RECORDS (OUTPATIENT)
Dept: HEALTH INFORMATION MANAGEMENT | Facility: CLINIC | Age: 53
End: 2018-12-28

## 2019-01-06 RX ORDER — HYDROXYZINE PAMOATE 25 MG/1
25 CAPSULE ORAL
COMMUNITY
Start: 2018-12-19

## 2019-01-06 RX ORDER — NYSTATIN 100000 [USP'U]/G
POWDER TOPICAL
Refills: 1 | COMMUNITY
Start: 2018-11-09

## 2019-01-16 ENCOUNTER — TRANSFERRED RECORDS (OUTPATIENT)
Dept: HEALTH INFORMATION MANAGEMENT | Facility: CLINIC | Age: 54
End: 2019-01-16

## 2019-01-18 ENCOUNTER — OFFICE VISIT (OUTPATIENT)
Dept: URGENT CARE | Facility: URGENT CARE | Age: 54
End: 2019-01-18
Payer: COMMERCIAL

## 2019-01-18 VITALS
TEMPERATURE: 98.2 F | DIASTOLIC BLOOD PRESSURE: 78 MMHG | WEIGHT: 173.6 LBS | BODY MASS INDEX: 27.19 KG/M2 | HEART RATE: 82 BPM | SYSTOLIC BLOOD PRESSURE: 126 MMHG | OXYGEN SATURATION: 98 %

## 2019-01-18 DIAGNOSIS — I10 ESSENTIAL HYPERTENSION: Primary | ICD-10-CM

## 2019-01-18 PROCEDURE — 99213 OFFICE O/P EST LOW 20 MIN: CPT | Performed by: FAMILY MEDICINE

## 2019-01-18 ASSESSMENT — ENCOUNTER SYMPTOMS
COUGH: 0
BLURRED VISION: 0
DEPRESSION: 0
ABDOMINAL PAIN: 0
FOCAL WEAKNESS: 0
FREQUENCY: 0
CONSTIPATION: 0
DYSURIA: 0
CHILLS: 0
DIARRHEA: 0
SHORTNESS OF BREATH: 0
NAUSEA: 0
MYALGIAS: 0
TINGLING: 0
SORE THROAT: 0
VOMITING: 0
FEVER: 0
HEADACHES: 0

## 2019-01-19 NOTE — PATIENT INSTRUCTIONS
1) Take 10 mg lisinopril daily  2) Go swimming  3) Stop checking blood pressure  4) Follow-up with your primary care physician in one week

## 2019-01-19 NOTE — PROGRESS NOTES
SUBJECTIVE:  Thanh Loaiza is a 53 year old male who presents to the office with the CC of chest pain.  Patient complains of concern for HTN.  He has HO HTN x6 yrs was tx with atenolol/HCTZ but had low K so switched to lisinopril 6 mon ago well tx, tapered to 5 mg couple weeks ago had high BP 2 days ago was seen in ED rec to inc lisinopril.  Pt took total 30 mg today 20 AM,, 10 PM concerned about poorly controlled HTN and rx for stroke.   No N/T, weakness facial abnormalities.  Pt also stressed at work feels it is contributing, does swim for relief.  Pt wondering what to do for HTN tonight.  Has PCP saw in 8/18 last.    Past Medical History:   Diagnosis Date     DDD (degenerative disc disease), lumbar      DJD (degenerative joint disease), lumbar      Hypertension goal BP (blood pressure) < 140/90 12/14/2010         Current Outpatient Medications:      aspirin 81 MG tablet, Take 81 mg by mouth as needed , Disp: 90 tablet, Rfl: 3     esomeprazole (NEXIUM) 40 MG DR capsule, Take 1 capsule (40 mg) by mouth every morning (before breakfast) Take 30-60 minutes before eating., Disp: 90 capsule, Rfl: 1     hydrOXYzine (VISTARIL) 25 MG capsule, Take 25 mg by mouth, Disp: , Rfl:      lisinopril (PRINIVIL/ZESTRIL) 10 MG tablet, , Disp: , Rfl: 3     NYAMYC 830080 UNIT/[facility] external powder, , Disp: , Rfl: 1     potassium chloride SA (K-DUR/KLOR-CON M) 10 MEQ CR tablet, Take 1 tablet (10 mEq) by mouth daily (Patient not taking: Reported on 12/27/2018), Disp: 30 tablet, Rfl: 11    Social History     Tobacco Use     Smoking status: Never Smoker     Smokeless tobacco: Never Used   Substance Use Topics     Alcohol use: No       ROS:  Review of Systems   Constitutional: Negative for chills and fever.   HENT: Negative for sore throat.    Eyes: Negative for blurred vision.   Respiratory: Negative for cough and shortness of breath.    Cardiovascular: Negative for chest pain.   Gastrointestinal: Negative for abdominal pain, constipation,  diarrhea, nausea and vomiting.   Genitourinary: Negative for dysuria and frequency.   Musculoskeletal: Negative for myalgias.   Skin: Negative for rash.   Neurological: Negative for tingling, focal weakness and headaches.   Psychiatric/Behavioral: Negative for depression.         EXAM:  /78 (BP Location: Left arm, Patient Position: Chair, Cuff Size: Adult Large)   Pulse 82   Temp 98.2  F (36.8  C) (Oral)   Wt 78.7 kg (173 lb 9.6 oz)   SpO2 98%   BMI 27.19 kg/m    GENERAL APPEARANCE: healthy, alert and no distress  EYES: EOMI,  PERRL, conjunctiva clear  HENT: ear canals and TM's normal.  Nose and mouth without ulcers, erythema or lesions  NECK: supple, nontender, no lymphadenopathy  RESP: lungs clear to auscultation - no rales, rhonchi or wheezes  CV: regular rates and rhythm, normal S1 S2, no murmur noted  ABDOMEN:  soft, nontender, no HSM or masses and bowel sounds normal  NEURO: Normal strength and tone, sensory exam grossly normal,  normal speech and mentation, CN II-XII intact  SKIN: no suspicious lesions or rashes     Assessment  / IMPRESSION  HTN: Not at goal but not concerning, pt was reassured about needing time to control BP.  Will have him take 10 mg starting tomorrow and f/u with PCP in 1-2 weeks for recheck.  Pt was instructed to cont swimming to help relax.  Concerning signs/sx that would warrant urgent evaluation were discussed.  All questions were answered, patient understands and agrees with plan.        PLAN:See orders in [facility]

## 2019-03-22 ENCOUNTER — TRANSFERRED RECORDS (OUTPATIENT)
Dept: HEALTH INFORMATION MANAGEMENT | Facility: CLINIC | Age: 54
End: 2019-03-22

## 2019-06-19 ENCOUNTER — OFFICE VISIT (OUTPATIENT)
Dept: DERMATOLOGY | Facility: CLINIC | Age: 54
End: 2019-06-19
Payer: COMMERCIAL

## 2019-06-19 DIAGNOSIS — D17.1 LIPOMA OF TORSO: Primary | ICD-10-CM

## 2019-06-19 PROCEDURE — 99202 OFFICE O/P NEW SF 15 MIN: CPT | Performed by: DERMATOLOGY

## 2019-06-19 RX ORDER — LOSARTAN POTASSIUM 25 MG/1
50 TABLET ORAL
COMMUNITY
Start: 2019-04-15

## 2019-06-19 ASSESSMENT — PAIN SCALES - GENERAL: PAINLEVEL: NO PAIN (0)

## 2019-06-19 NOTE — NURSING NOTE
@Thanh Loaiza's goals for this visit include:   Chief Complaint   Patient presents with     Lesion     lipomas - had MRI which showed Lipoma - 2nd opinion - starting to get uncomfortable (10-15%)       He requests these members of his care team be copied on today's visit information: NO    PCP: Saravanan Bocanegra    Referring Provider:  No referring provider defined for this encounter.    There were no vitals taken for this visit.    Do you need any medication refills at today's visit? NO    Anisha Clark CMA

## 2019-06-19 NOTE — PROGRESS NOTES
[facility] Dermatology Note      Dermatology Problem List:  1.Lipoma, s/p ultrasound at [facility] in 2018  -Ultrasound of the right lower quadrant lump demonstrates a benign appearing lipoma measuring 3.6 x 0.8 x 2.0 cm. In the area of adjacent pain, only normal subcutaneous fat and rectus abdominous musculature identified.    Encounter Date: Jun 19, 2019    CC:  Chief Complaint   Patient presents with     Lesion     lipomas - had MRI which showed Lipoma - 2nd opinion - starting to get uncomfortable (10-15%)         History of Present Illness:  Mr. Thanh Loaiza is a 53 year old male who presents in self referral for lipomas. He has had one on the abdomen for a year. The other one has been present for a few months. He had a ultrasound at [facility] which was consistent with lipoma.  He is here to get a second opinion as he worries about there. He did see a surgeon offered to excise, patient ultimately declined. He feels stretching in the skin when he sits in a car that he thinks is related to the lipomas. Otherwise they are not symptomatic or tender. No other concerns addressed today.      Past Medical History:   Patient Active Problem List   Diagnosis     Erectile dysfunction     CARDIOVASCULAR SCREENING; LDL GOAL LESS THAN 130     Disorder of sacrum     Lumbago     Essential hypertension     DJD (degenerative joint disease), lumbar     Cataract extraction status of right eye     Gastroesophageal reflux disease without esophagitis     H. pylori infection     Complex tear of medial meniscus of right knee as current injury, subsequent encounter     Chondromalacia of right knee     S/P arthroscopy of right knee     Right knee pain     S/P arthroscopic partial medial meniscectomy     External hemorrhoids     S/P right inguinal hernia repair     Benign lipomatous neoplasm of skin and subcutaneous tissue of trunk     Acute gout of right ankle, unspecified cause     Intestinal disaccharidase deficiencies and  disaccharide malabsorption     Nonspecific reaction to tuberculin skin test without active tuberculosis     Past Medical History:   Diagnosis Date     DDD (degenerative disc disease), lumbar      DJD (degenerative joint disease), lumbar      Hypertension goal BP (blood pressure) < 140/90 12/14/2010     Past Surgical History:   Procedure Laterality Date     ARTHROSCOPY KNEE Right 3/16/2018    Procedure: ARTHROSCOPY KNEE;  Right knee arthroscopy, debridement ;  Surgeon: Dale Lester MD;  Location: [facility] OR     COLONOSCOPY  10/14/2011    Procedure:COLONOSCOPY; Colonoscopy, change in stools, diarrhea; Surgeon:ALEIDA DELEON; Location:[facility] OR     COLONOSCOPY  10/14/2011    Procedure:COMBINED COLONOSCOPY, SINGLE BIOPSY/POLYPECTOMY BY BIOPSY; Surgeon:ALEIDA DELEON; Location:[facility] OR     ORTHOPEDIC SURGERY       TONSILLECTOMY      age 11 years       Social History:  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       Family History:  Negative for skin cancer.     Medications:  Current Outpatient Medications   Medication Sig Dispense Refill     aspirin 81 MG tablet Take 81 mg by mouth as needed  90 tablet 3     esomeprazole (NEXIUM) 40 MG DR capsule Take 1 capsule (40 mg) by mouth every morning (before breakfast) Take 30-60 minutes before eating. 90 capsule 1     hydrOXYzine (VISTARIL) 25 MG capsule Take 25 mg by mouth       losartan (COZAAR) 25 MG tablet Take 50 mg by mouth       NYAMYC 874707 UNIT/GM external powder   1     potassium chloride SA (K-DUR/KLOR-CON M) 10 MEQ CR tablet Take 1 tablet (10 mEq) by mouth daily 30 tablet 11     lisinopril (PRINIVIL/ZESTRIL) 10 MG tablet   3       No Known Allergies    Review of Systems:  -Constitutional: Patient is otherwise feeling well, in usual state of health.   -Skin: As above in HPI. No additional skin concerns.    Physical exam:  Vitals: There were no vitals taken for this visit.  GEN: This is a well developed, well-nourished male in no acute distress, in a pleasant  mood.    SKIN: Focused examination of the face, hands, and abdomen was performed.  - Hall Type III  - right superior abdomen ~3 cm rubbery subcutaneous nodule  - do not appreciate any on the right lateral abdomen  - No other lesions of concern on areas examined.       Impression/Plan:    1. Lipomas. Patient had ultrasound through [facility] which was consistent. Lesions are clinically consistent with lipoma as well. Discussed the pathogenesis of this condition. Discussed that these are benign, and it is not necessary to excise them unless they developing a concerning sign (rapid changes in size, sudden consistent tenderness, change in texture) or are significantly symptomatic.     Patient advised to call in if he wants to proceed with excision if the future, this can be done either with dermatology under local anesthetic or general surgery for sedation.       Follow-up prn.       Staff Involved:  Scribe/Staff    Scribe Disclosure  I, Yen Guallpa, am serving as a scribe to document services personally performed by Dr. Julia Turner MD, based on data collection and the provider's statements to me.     Provider Disclosure:   The documentation recorded by the scribe accurately reflects the services I personally performed and the decisions made by me.    Julia Turner MD    Department of Dermatology  [facility]: Phone: [phone], Fax:[phone]  [facility]: Phone: [phone], Fax: [phone]

## 2019-06-19 NOTE — LETTER
6/19/2019         RE: Thanh Loaiza  [address]  [place] [address]        Dear Colleague,    Thank you for referring your patient, Thanh Loaiza, to the [facility]. Please see a copy of my visit note below.    [facility] Dermatology Note      Dermatology Problem List:  1.Lipoma, s/p ultrasound at [facility] in 2018  -Ultrasound of the right lower quadrant lump demonstrates a benign appearing lipoma measuring 3.6 x 0.8 x 2.0 cm. In the area of adjacent pain, only normal subcutaneous fat and rectus abdominous musculature identified.    Encounter Date: Jun 19, 2019    CC:  Chief Complaint   Patient presents with     Lesion     lipomas - had MRI which showed Lipoma - 2nd opinion - starting to get uncomfortable (10-15%)         History of Present Illness:  Mr. Thanh Loaiza is a 53 year old male who presents in self referral for lipomas. He has had one on the abdomen for a year. The other one has been present for a few months. He had a ultrasound at [facility] which was consistent with lipoma.  He is here to get a second opinion as he worries about there. He did see a surgeon offered to excise, patient ultimately declined. He feels stretching in the skin when he sits in a car that he thinks is related to the lipomas. Otherwise they are not symptomatic or tender. No other concerns addressed today.      Past Medical History:   Patient Active Problem List   Diagnosis     Erectile dysfunction     CARDIOVASCULAR SCREENING; LDL GOAL LESS THAN 130     Disorder of sacrum     Lumbago     Essential hypertension     DJD (degenerative joint disease), lumbar     Cataract extraction status of right eye     Gastroesophageal reflux disease without esophagitis     H. pylori infection     Complex tear of medial meniscus of right knee as current injury, subsequent encounter     Chondromalacia of right knee     S/P arthroscopy of right knee     Right knee pain     S/P arthroscopic partial  medial meniscectomy     External hemorrhoids     S/P right inguinal hernia repair     Benign lipomatous neoplasm of skin and subcutaneous tissue of trunk     Acute gout of right ankle, unspecified cause     Intestinal disaccharidase deficiencies and disaccharide malabsorption     Nonspecific reaction to tuberculin skin test without active tuberculosis     Past Medical History:   Diagnosis Date     DDD (degenerative disc disease), lumbar      DJD (degenerative joint disease), lumbar      Hypertension goal BP (blood pressure) < 140/90 12/14/2010     Past Surgical History:   Procedure Laterality Date     ARTHROSCOPY KNEE Right 3/16/2018    Procedure: ARTHROSCOPY KNEE;  Right knee arthroscopy, debridement ;  Surgeon: Dale Lester MD;  Location: [facility] OR     COLONOSCOPY  10/14/2011    Procedure:COLONOSCOPY; Colonoscopy, change in stools, diarrhea; Surgeon:ALEIDA DELEON; Location:[facility] OR     COLONOSCOPY  10/14/2011    Procedure:COMBINED COLONOSCOPY, SINGLE BIOPSY/POLYPECTOMY BY BIOPSY; Surgeon:ALEIDA DELEON; Location:[facility] OR     ORTHOPEDIC SURGERY       TONSILLECTOMY      age 11 years       Social History:  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       Family History:  Negative for skin cancer.     Medications:  Current Outpatient Medications   Medication Sig Dispense Refill     aspirin 81 MG tablet Take 81 mg by mouth as needed  90 tablet 3     esomeprazole (NEXIUM) 40 MG DR capsule Take 1 capsule (40 mg) by mouth every morning (before breakfast) Take 30-60 minutes before eating. 90 capsule 1     hydrOXYzine (VISTARIL) 25 MG capsule Take 25 mg by mouth       losartan (COZAAR) 25 MG tablet Take 50 mg by mouth       NYAMYC [id number] UNIT/GM external powder   1     potassium chloride SA (K-DUR/KLOR-CON M) 10 MEQ CR tablet Take 1 tablet (10 mEq) by mouth daily 30 tablet 11     lisinopril (PRINIVIL/ZESTRIL) 10 MG tablet   3       No Known Allergies    Review of Systems:  -Constitutional: Patient is otherwise  feeling well, in usual state of health.   -Skin: As above in HPI. No additional skin concerns.    Physical exam:  Vitals: There were no vitals taken for this visit.  GEN: This is a well developed, well-nourished male in no acute distress, in a pleasant mood.    SKIN: Focused examination of the face, hands, and abdomen was performed.  - Hall Type III  - right superior abdomen ~3 cm rubbery subcutaneous nodule  - do not appreciate any on the right lateral abdomen  - No other lesions of concern on areas examined.       Impression/Plan:    1. Lipomas. Patient had ultrasound through [facility] which was consistent. Lesions are clinically consistent with lipoma as well. Discussed the pathogenesis of this condition. Discussed that these are benign, and it is not necessary to excise them unless they developing a concerning sign (rapid changes in size, sudden consistent tenderness, change in texture) or are significantly symptomatic.     Patient advised to call in if he wants to proceed with excision if the future, this can be done either with dermatology under local anesthetic or general surgery for sedation.       Follow-up prn.       Staff Involved:  Scribe/Staff    Scribe Disclosure  I, Yen Guallpa, am serving as a scribe to document services personally performed by Dr. Julia Turner MD, based on data collection and the provider's statements to me.     Provider Disclosure:   The documentation recorded by the scribe accurately reflects the services I personally performed and the decisions made by me.    Julia Turner MD    Department of Dermatology  [facility]: Phone: [phone], Fax:[phone]  [facility]: Phone: [phone], Fax: [phone]              Again, thank you for allowing me to participate in the care of your patient.        Sincerely,        Julia Turner MD

## 2019-08-06 PROBLEM — Z98.890 S/P ARTHROSCOPIC PARTIAL MEDIAL MENISCECTOMY: Status: RESOLVED | Noted: 2018-03-30 | Resolved: 2019-08-06

## 2019-08-06 PROBLEM — M25.561 RIGHT KNEE PAIN: Status: RESOLVED | Noted: 2018-03-30 | Resolved: 2019-08-06

## 2019-08-06 NOTE — PROGRESS NOTES
Discharge Note    Progress reporting period is from last progress note on 06/11/18 to Jul 10, 2018.    Thanh failed to follow up and current status is unknown.  Please see information below for last relevant information on current status.  Patient seen for 12 visits.    SUBJECTIVE  Subjective changes noted by patient:     .  Current pain level is 0/10.     Previous pain level was   .   Changes in function:  Yes (See Goal flowsheet attached for changes in current functional level)  Adverse reaction to treatment or activity: None    OBJECTIVE  Changes noted in objective findings:       ASSESSMENT/PLAN  Diagnosis: s/p R partial med menisectomy   Updated problem list and treatment plan:   Pain - HEP  Decreased ROM/flexibility - HEP  Decreased function - HEP  Decreased strength - HEP  Impaired muscle performance - HEP  Edema - HEP  Impaired gait - HEP  Impaired balance - HEP  Decreased proprioception - HEP  STG/LTGs have been met or progress has been made towards goals:  Yes, please see goal flowsheet for most current information  Assessment of Progress: current status is unknown.    Last current status:     Self Management Plans:  HEP  I have re-evaluated this patient and find that the nature, scope, duration and intensity of the therapy is appropriate for the medical condition of the patient.  Thanh continues to require the following intervention to meet STG and LTG's:  HEP.    Recommendations:  Discharge with current home program.  Patient to follow up with MD as needed.    Please refer to the daily flowsheet for treatment today, total treatment time and time spent performing 1:1 timed codes.

## 2020-02-15 NOTE — TELEPHONE ENCOUNTER
Advised to call Ins for benefits  
Type of surgery: Right knee arthroscopy, debridement CPT 86323  Complex tear of medial meniscus of right knee as current injury, subsequent encounter [S83.231D]  - Primary   Location of surgery: [facility]  Date and time of surgery: 03/16/2018 / 10:15am   Surgeon: REGGIE Lester   Pre-Op Appt Date: 03/14/2018  Post-Op Appt Date: 03/22/2018   Packet sent out: Yes  Pre-cert/Authorization completed:  Authorization not required for outpatient for this patients plan.   Date: 03/13/2019  
Home

## 2020-11-17 ENCOUNTER — VIRTUAL VISIT (OUTPATIENT)
Dept: FAMILY MEDICINE | Facility: CLINIC | Age: 55
End: 2020-11-17
Payer: COMMERCIAL

## 2020-11-17 DIAGNOSIS — Z53.9 ERRONEOUS ENCOUNTER--DISREGARD: Primary | ICD-10-CM

## 2020-11-17 NOTE — PROGRESS NOTES
Patient had appt earlier today at outside [facility] and appt for testing was scheduled then.  We likely don't have earlier availability than that ( Thursday) . appt cancelled.    aXvier Holland PA-C

## 2022-11-13 ENCOUNTER — TRANSFERRED RECORDS (OUTPATIENT)
Dept: HEALTH INFORMATION MANAGEMENT | Facility: CLINIC | Age: 57
End: 2022-11-13

## 2022-11-18 ENCOUNTER — TRANSFERRED RECORDS (OUTPATIENT)
Dept: HEALTH INFORMATION MANAGEMENT | Facility: CLINIC | Age: 57
End: 2022-11-18

## 2022-12-09 ENCOUNTER — TRANSFERRED RECORDS (OUTPATIENT)
Dept: HEALTH INFORMATION MANAGEMENT | Facility: CLINIC | Age: 57
End: 2022-12-09

## 2023-01-31 ENCOUNTER — TRANSFERRED RECORDS (OUTPATIENT)
Dept: HEALTH INFORMATION MANAGEMENT | Facility: CLINIC | Age: 58
End: 2023-01-31

## 2023-02-13 ENCOUNTER — TRANSFERRED RECORDS (OUTPATIENT)
Dept: HEALTH INFORMATION MANAGEMENT | Facility: CLINIC | Age: 58
End: 2023-02-13

## 2023-03-02 ENCOUNTER — TRANSFERRED RECORDS (OUTPATIENT)
Dept: HEALTH INFORMATION MANAGEMENT | Facility: CLINIC | Age: 58
End: 2023-03-02

## 2023-03-09 ENCOUNTER — TRANSFERRED RECORDS (OUTPATIENT)
Dept: HEALTH INFORMATION MANAGEMENT | Facility: CLINIC | Age: 58
End: 2023-03-09

## 2023-05-11 ENCOUNTER — TRANSFERRED RECORDS (OUTPATIENT)
Dept: HEALTH INFORMATION MANAGEMENT | Facility: CLINIC | Age: 58
End: 2023-05-11
Payer: COMMERCIAL

## 2025-03-26 ENCOUNTER — MEDICAL CORRESPONDENCE (OUTPATIENT)
Dept: HEALTH INFORMATION MANAGEMENT | Facility: CLINIC | Age: 60
End: 2025-03-26
Payer: COMMERCIAL

## (undated) DEVICE — SOL NACL 0.9% IRRIG 3000ML BAG 07972-08

## (undated) DEVICE — PACK ARTHROSCOPY KNEE SOP15AKFSM

## (undated) DEVICE — GLOVE PROTEXIS POWDER FREE 7.0 ORTHOPEDIC 2D73ET70

## (undated) DEVICE — BNDG ELASTIC 6"X5YDS UNSTERILE 6611-60

## (undated) DEVICE — TUBING IRRIG TUR Y TYPE 96" LF 6543-01

## (undated) DEVICE — BLADE DYONICS CVD CONCAVE SYNOVATOR 4.5MM 7205334

## (undated) DEVICE — SOL WATER IRRIG 1000ML BOTTLE 07139-09

## (undated) DEVICE — BNDG ESMARK 6" STERILE

## (undated) DEVICE — GLOVE PROTEXIS POWDER FREE 7.5 ORTHOPEDIC 2D73ET75

## (undated) DEVICE — BLADE DYONICS INCISOR PLUS ELITE 4.5MM STR 7205310

## (undated) DEVICE — DRSG STERI STRIP 1/2X4" R1547

## (undated) DEVICE — BLADE KNIFE SURG 11 371111

## (undated) DEVICE — GLOVE PROTEXIS BLUE W/NEU-THERA 7.5  2D73EB75

## (undated) RX ORDER — CEFAZOLIN SODIUM 2 G/100ML
INJECTION, SOLUTION INTRAVENOUS
Status: DISPENSED
Start: 2018-03-16

## (undated) RX ORDER — ONDANSETRON 2 MG/ML
INJECTION INTRAMUSCULAR; INTRAVENOUS
Status: DISPENSED
Start: 2018-03-16

## (undated) RX ORDER — DEXAMETHASONE SODIUM PHOSPHATE 4 MG/ML
INJECTION, SOLUTION INTRA-ARTICULAR; INTRALESIONAL; INTRAMUSCULAR; INTRAVENOUS; SOFT TISSUE
Status: DISPENSED
Start: 2018-03-16

## (undated) RX ORDER — GABAPENTIN 300 MG/1
CAPSULE ORAL
Status: DISPENSED
Start: 2018-03-16

## (undated) RX ORDER — FENTANYL CITRATE 50 UG/ML
INJECTION, SOLUTION INTRAMUSCULAR; INTRAVENOUS
Status: DISPENSED
Start: 2018-03-16

## (undated) RX ORDER — ACETAMINOPHEN 325 MG/1
TABLET ORAL
Status: DISPENSED
Start: 2018-03-16

## (undated) RX ORDER — PROPOFOL 10 MG/ML
INJECTION, EMULSION INTRAVENOUS
Status: DISPENSED
Start: 2018-03-16